# Patient Record
Sex: MALE | Race: WHITE | Employment: FULL TIME | URBAN - METROPOLITAN AREA
[De-identification: names, ages, dates, MRNs, and addresses within clinical notes are randomized per-mention and may not be internally consistent; named-entity substitution may affect disease eponyms.]

---

## 2018-08-18 ENCOUNTER — HOSPITAL ENCOUNTER (EMERGENCY)
Age: 68
Discharge: HOME | End: 2018-08-18
Attending: EMERGENCY MEDICINE
Payer: COMMERCIAL

## 2018-08-18 ENCOUNTER — APPOINTMENT (OUTPATIENT)
Dept: GENERAL RADIOLOGY | Age: 68
End: 2018-08-18
Payer: COMMERCIAL

## 2018-08-18 VITALS
SYSTOLIC BLOOD PRESSURE: 169 MMHG | RESPIRATION RATE: 16 BRPM | TEMPERATURE: 99.2 F | DIASTOLIC BLOOD PRESSURE: 116 MMHG | BODY MASS INDEX: 28.04 KG/M2 | HEART RATE: 60 BPM | WEIGHT: 185 LBS | OXYGEN SATURATION: 95 % | HEIGHT: 68 IN

## 2018-08-18 DIAGNOSIS — S20.211A RIB CONTUSION, RIGHT, INITIAL ENCOUNTER: ICD-10-CM
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
## 2018-08-18 PROCEDURE — 99283 EMERGENCY DEPT VISIT LOW MDM: CPT

## 2018-08-18 PROCEDURE — 6370000000 HC RX 637 (ALT 250 FOR IP): Performed by: NURSE PRACTITIONER

## 2018-08-18 PROCEDURE — 71046 X-RAY EXAM CHEST 2 VIEWS: CPT

## 2018-08-18 RX ORDER — METOPROLOL SUCCINATE 25 MG/1
12.5 TABLET, EXTENDED RELEASE ORAL DAILY
COMMUNITY

## 2018-08-18 RX ORDER — HYDROCODONE BITARTRATE AND ACETAMINOPHEN 5; 325 MG/1; MG/1
1 TABLET ORAL ONCE
Status: COMPLETED | OUTPATIENT
Start: 2018-08-18 | End: 2018-08-18

## 2018-08-18 RX ORDER — LEVOTHYROXINE SODIUM 0.03 MG/1
25 TABLET ORAL DAILY
COMMUNITY

## 2018-08-18 RX ORDER — HYDROCODONE BITARTRATE AND ACETAMINOPHEN 5; 325 MG/1; MG/1
1-2 TABLET ORAL EVERY 6 HOURS PRN
Qty: 8 TABLET | Refills: 0 | Status: SHIPPED | OUTPATIENT
Start: 2018-08-18 | End: 2018-08-20

## 2018-08-18 RX ORDER — ROSUVASTATIN CALCIUM 40 MG/1
40 TABLET, COATED ORAL EVERY EVENING
COMMUNITY

## 2018-08-18 RX ORDER — LIDOCAINE 50 MG/G
1 PATCH TOPICAL DAILY
Qty: 30 PATCH | Refills: 0 | Status: SHIPPED | OUTPATIENT
Start: 2018-08-18

## 2018-08-18 RX ADMIN — HYDROCODONE BITARTRATE AND ACETAMINOPHEN 1 TABLET: 5; 325 TABLET ORAL at 20:07

## 2018-08-18 ASSESSMENT — ENCOUNTER SYMPTOMS
COUGH: 0
SHORTNESS OF BREATH: 0
NAUSEA: 0
BACK PAIN: 1
ABDOMINAL DISTENTION: 0
ABDOMINAL PAIN: 0
EYES NEGATIVE: 1
ALLERGIC/IMMUNOLOGIC NEGATIVE: 1
VOMITING: 0
WHEEZING: 0

## 2018-08-18 ASSESSMENT — PAIN DESCRIPTION - PAIN TYPE: TYPE: ACUTE PAIN

## 2018-08-18 ASSESSMENT — PAIN DESCRIPTION - LOCATION: LOCATION: BACK;RIB CAGE

## 2018-08-18 ASSESSMENT — PAIN SCALES - GENERAL
PAINLEVEL_OUTOF10: 3
PAINLEVEL_OUTOF10: 6

## 2018-08-19 NOTE — ED PROVIDER NOTES
Neurological: Negative for dizziness, seizures, syncope, speech difficulty, weakness, light-headedness, numbness and headaches. Hematological: Negative. Psychiatric/Behavioral: Negative. Positives and Pertinent negatives as per HPI. Except as noted above in the ROS, all other systems were reviewed and negative. PAST MEDICAL HISTORY     Past Medical History:   Diagnosis Date    CAD (coronary artery disease)     Hypertension     Thyroid disease          SURGICAL HISTORY       Past Surgical History:   Procedure Laterality Date    CORONARY ANGIOPLASTY WITH STENT PLACEMENT           CURRENT MEDICATIONS       Previous Medications    ASPIRIN 81 MG TABLET    Take 81 mg by mouth daily    LEVOTHYROXINE (SYNTHROID) 25 MCG TABLET    Take 25 mcg by mouth Daily    METOPROLOL SUCCINATE (TOPROL XL) 25 MG EXTENDED RELEASE TABLET    Take 12.5 mg by mouth daily    ROSUVASTATIN (CRESTOR) 40 MG TABLET    Take 40 mg by mouth every evening         ALLERGIES     Patient has no known allergies. FAMILY HISTORY     History reviewed. No pertinent family history. SOCIAL HISTORY       Social History     Social History    Marital status:      Spouse name: N/A    Number of children: N/A    Years of education: N/A     Social History Main Topics    Smoking status: Never Smoker    Smokeless tobacco: None    Alcohol use Yes      Comment: occ    Drug use: No    Sexual activity: Not Asked     Other Topics Concern    None     Social History Narrative    None       SCREENINGS             PHYSICAL EXAM    (up to 7 for level 4, 8 or more for level 5)     ED Triage Vitals [08/18/18 1927]   BP Temp Temp Source Pulse Resp SpO2 Height Weight   (!) 169/116 99.2 °F (37.3 °C) Oral 64 18 97 % 5' 8\" (1.727 m) 185 lb (83.9 kg)       Physical Exam   Constitutional: He is oriented to person, place, and time. He appears well-developed and well-nourished. No distress. HENT:   Head: Normocephalic and atraumatic.    Nose: The patient and / or the family were informed of the results of any tests, a time was given to answer questions, a plan was proposed and they agreed with plan. FINAL IMPRESSION      1. Fall on same level from tripping as cause of accidental injury    2. Rib contusion, right, initial encounter          DISPOSITION/PLAN   DISPOSITION        PATIENT REFERRED TO:  Molly Frank  [phone]  Schedule an appointment as soon as possible for a visit in 3 days  For recheck    [facility]  ED  [address] [facility] [address] [facility]  Go to   For new or worsening symptoms      DISCHARGE MEDICATIONS:  New Prescriptions    HYDROCODONE-ACETAMINOPHEN (NORCO) 5-325 MG PER TABLET    Take 1-2 tablets by mouth every 6 hours as needed for Pain for up to 2 days. Sedation precautions please. LIDOCAINE (LIDODERM) 5 %    Place 1 patch onto the skin daily 12 hours on, 12 hours off.        DISCONTINUED MEDICATIONS:  Discontinued Medications    No medications on file              (Please note that portions of this note were completed with a voice recognition program.  Efforts were made to edit the dictations but occasionally words are mis-transcribed.)    AMAURI Hankins CNP (electronically signed)            AMAURI Hankins CNP  08/18/18 7065

## 2018-08-19 NOTE — ED NOTES
Cleaned pt's knee abraision with Hibiclens, Saline, and 4x4 gauze. Pt tolerated well. [facility] NP notified of completion.        Melly Olivares  08/18/18 2053

## 2018-08-19 NOTE — ED PROVIDER NOTES
I independently performed a history and physical on Amina Darnell. All diagnostic, treatment, and disposition decisions were made by myself in conjunction with the advanced practice provider. For further details of [address] emergency department encounter, please see Namita Welch NP's documentation. Patient is a well-appearing 65-year-old male who was playing pickle ball just PTA whenever he dove for a ball and subsequently landed on his right chest and lower back and is complaining about right chest pain currently. He had actually no chest pain before the fall and briefly had some shortness of breath after the fall but denies any shortness of breath currently. He does have a history of coronary stent but states this chest pain feels different. Denies any head trauma and he did not pass out. He denies any headache, neck pain, current back pain, abdominal pain, extremity pain other than mild discomfort to the right knee where he has an abrasion. No other concerns at this time he just wanted to get an x-ray to make sure he had no rib fractures. Physical:   Gen: No acute distress. AOx3.   Pysch: Normal mood and affect  HEENT: NCAT, EOMI, PERRL, MMM  Neck: supple, NTTP, no midline tenderness  Back: no midline tenderness, mild right thoracic paraspinal tenderness, no lumbar tenderness   Cardiac: RRR, pulses 2+ in upper extremities  Chest: Tenderness to palpation to right lateral chest, no crepitus, no flail segment  Lungs: C2AB, no R/R/W  Abdomen: soft and nontender with no R/D/G  Neuro: no focal neuro deficits with strength and sensation 5/5 in all 4 extremities  MSK: Normal range of motion of bilateral shoulders, elbows, wrists, hips, knees, ankles and nontender to palpation of all joints   Skin: abrasion to right knee, no ecchymosis noted to right chest    MDM:  Patient was evaluated due to concern for fall while playing Loopback ball and subsequently experiencing sudden chest pain after the

## 2018-09-05 ENCOUNTER — OFFICE VISIT CONVERTED (OUTPATIENT)
Dept: PULMONOLOGY | Facility: CLINIC | Age: 68
End: 2018-09-05
Attending: INTERNAL MEDICINE

## 2018-11-14 ENCOUNTER — OFFICE VISIT CONVERTED (OUTPATIENT)
Dept: UROLOGY | Facility: CLINIC | Age: 68
End: 2018-11-14
Attending: UROLOGY

## 2019-04-03 ENCOUNTER — HOSPITAL ENCOUNTER (OUTPATIENT)
Dept: LAB | Facility: HOSPITAL | Age: 69
Discharge: HOME | End: 2019-04-03
Attending: INTERNAL MEDICINE

## 2019-04-03 LAB
25(OH)D3 SERPL-MCNC: 30.5 NG/ML (ref 30–100)
ALBUMIN SERPL-MCNC: 4.3 G/DL (ref 3.5–5)
ALBUMIN/GLOB SERPL: 1.7 {RATIO} (ref 1.4–2.6)
ALP SERPL-CCNC: 67 U/L (ref 56–155)
ALT SERPL-CCNC: 36 U/L (ref 10–40)
ANION GAP SERPL CALC-SCNC: 20 MMOL/L (ref 8–19)
AST SERPL-CCNC: 26 U/L (ref 15–50)
BASOPHILS # BLD AUTO: 0.06 10*3/UL (ref 0–0.2)
BASOPHILS NFR BLD AUTO: 1 % (ref 0–3)
BILIRUB SERPL-MCNC: 0.33 MG/DL (ref 0.2–1.3)
BUN SERPL-MCNC: 18 MG/DL (ref 5–25)
BUN/CREAT SERPL: 13 {RATIO} (ref 6–20)
CALCIUM SERPL-MCNC: 9.4 MG/DL (ref 8.7–10.4)
CHLORIDE SERPL-SCNC: 106 MMOL/L (ref 99–111)
CHOLEST SERPL-MCNC: 126 MG/DL (ref 107–200)
CHOLEST/HDLC SERPL: 3 {RATIO} (ref 3–6)
CONV ABS IMM GRAN: 0.02 10*3/UL (ref 0–0.2)
CONV CO2: 25 MMOL/L (ref 22–32)
CONV IMMATURE GRAN: 0.3 % (ref 0–1.8)
CONV TOTAL PROTEIN: 6.8 G/DL (ref 6.3–8.2)
CREAT UR-MCNC: 1.38 MG/DL (ref 0.7–1.2)
DEPRECATED RDW RBC AUTO: 44.5 FL (ref 35.1–43.9)
EOSINOPHIL # BLD AUTO: 0.17 10*3/UL (ref 0–0.7)
EOSINOPHIL # BLD AUTO: 2.9 % (ref 0–7)
ERYTHROCYTE [DISTWIDTH] IN BLOOD BY AUTOMATED COUNT: 12.3 % (ref 11.6–14.4)
GFR SERPLBLD BASED ON 1.73 SQ M-ARVRAT: 51 ML/MIN/{1.73_M2}
GLOBULIN UR ELPH-MCNC: 2.5 G/DL (ref 2–3.5)
GLUCOSE SERPL-MCNC: 111 MG/DL (ref 70–99)
HBA1C MFR BLD: 14.7 G/DL (ref 14–18)
HCT VFR BLD AUTO: 47.4 % (ref 42–52)
HDLC SERPL-MCNC: 42 MG/DL (ref 40–60)
LDLC SERPL CALC-MCNC: 54 MG/DL (ref 70–100)
LYMPHOCYTES # BLD AUTO: 1.3 10*3/UL (ref 1–5)
MCH RBC QN AUTO: 30.3 PG (ref 27–31)
MCHC RBC AUTO-ENTMCNC: 31 G/DL (ref 33–37)
MCV RBC AUTO: 97.7 FL (ref 80–96)
MONOCYTES # BLD AUTO: 0.53 10*3/UL (ref 0.2–1.2)
MONOCYTES NFR BLD AUTO: 9 % (ref 3–10)
NEUTROPHILS # BLD AUTO: 3.84 10*3/UL (ref 2–8)
NEUTROPHILS NFR BLD AUTO: 64.8 % (ref 30–85)
NRBC CBCN: 0 % (ref 0–0.7)
OSMOLALITY SERPL CALC.SUM OF ELEC: 305 MOSM/KG (ref 273–304)
PLATELET # BLD AUTO: 176 10*3/UL (ref 130–400)
PMV BLD AUTO: 10.4 FL (ref 9.4–12.4)
POTASSIUM SERPL-SCNC: 4.6 MMOL/L (ref 3.5–5.3)
RBC # BLD AUTO: 4.85 10*6/UL (ref 4.7–6.1)
SODIUM SERPL-SCNC: 146 MMOL/L (ref 135–147)
T4 FREE SERPL-MCNC: 1.2 NG/DL (ref 0.9–1.8)
TRIGL SERPL-MCNC: 152 MG/DL (ref 40–150)
TSH SERPL-ACNC: 3.89 M[IU]/L (ref 0.27–4.2)
VARIANT LYMPHS NFR BLD MANUAL: 22 % (ref 20–45)
VLDLC SERPL-MCNC: 30 MG/DL (ref 5–37)
WBC # BLD AUTO: 5.92 10*3/UL (ref 4.8–10.8)

## 2019-08-22 ENCOUNTER — HOSPITAL ENCOUNTER (OUTPATIENT)
Dept: LAB | Facility: HOSPITAL | Age: 69
Discharge: HOME | End: 2019-08-22
Attending: INTERNAL MEDICINE

## 2019-08-22 LAB
25(OH)D3 SERPL-MCNC: 34.6 NG/ML (ref 30–100)
ALBUMIN SERPL-MCNC: 4.3 G/DL (ref 3.5–5)
ALBUMIN/GLOB SERPL: 1.9 {RATIO} (ref 1.4–2.6)
ALP SERPL-CCNC: 64 U/L (ref 56–155)
ALT SERPL-CCNC: 39 U/L (ref 10–40)
ANION GAP SERPL CALC-SCNC: 15 MMOL/L (ref 8–19)
APPEARANCE UR: CLEAR
AST SERPL-CCNC: 30 U/L (ref 15–50)
BASOPHILS # BLD AUTO: 0.05 10*3/UL (ref 0–0.2)
BASOPHILS NFR BLD AUTO: 0.8 % (ref 0–3)
BILIRUB SERPL-MCNC: 0.34 MG/DL (ref 0.2–1.3)
BILIRUB UR QL: NEGATIVE
BUN SERPL-MCNC: 17 MG/DL (ref 5–25)
BUN/CREAT SERPL: 15 {RATIO} (ref 6–20)
CALCIUM SERPL-MCNC: 9.3 MG/DL (ref 8.7–10.4)
CHLORIDE SERPL-SCNC: 107 MMOL/L (ref 99–111)
CHOLEST SERPL-MCNC: 107 MG/DL (ref 107–200)
CHOLEST/HDLC SERPL: 2.5 {RATIO} (ref 3–6)
COLOR UR: YELLOW
CONV ABS IMM GRAN: 0.02 10*3/UL (ref 0–0.2)
CONV CO2: 25 MMOL/L (ref 22–32)
CONV COLLECTION SOURCE (UA): ABNORMAL
CONV IMMATURE GRAN: 0.3 % (ref 0–1.8)
CONV TOTAL PROTEIN: 6.6 G/DL (ref 6.3–8.2)
CONV UROBILINOGEN IN URINE BY AUTOMATED TEST STRIP: 0.2 {EHRLICHU}/DL (ref 0.1–1)
CREAT UR-MCNC: 1.17 MG/DL (ref 0.7–1.2)
DEPRECATED RDW RBC AUTO: 42.9 FL (ref 35.1–43.9)
EOSINOPHIL # BLD AUTO: 0.09 10*3/UL (ref 0–0.7)
EOSINOPHIL # BLD AUTO: 1.4 % (ref 0–7)
ERYTHROCYTE [DISTWIDTH] IN BLOOD BY AUTOMATED COUNT: 12.6 % (ref 11.6–14.4)
FOLATE SERPL-MCNC: 9.5 NG/ML (ref 4.8–20)
GFR SERPLBLD BASED ON 1.73 SQ M-ARVRAT: >60 ML/MIN/{1.73_M2}
GLOBULIN UR ELPH-MCNC: 2.3 G/DL (ref 2–3.5)
GLUCOSE SERPL-MCNC: 86 MG/DL (ref 70–99)
GLUCOSE UR QL: NEGATIVE MG/DL
HCT VFR BLD AUTO: 44.8 % (ref 42–52)
HDLC SERPL-MCNC: 43 MG/DL (ref 40–60)
HGB BLD-MCNC: 14.6 G/DL (ref 14–18)
HGB UR QL STRIP: NEGATIVE
KETONES UR QL STRIP: ABNORMAL MG/DL
LDLC SERPL CALC-MCNC: 43 MG/DL (ref 70–100)
LEUKOCYTE ESTERASE UR QL STRIP: NEGATIVE
LYMPHOCYTES # BLD AUTO: 1.41 10*3/UL (ref 1–5)
LYMPHOCYTES NFR BLD AUTO: 21.4 % (ref 20–45)
MCH RBC QN AUTO: 30.2 PG (ref 27–31)
MCHC RBC AUTO-ENTMCNC: 32.6 G/DL (ref 33–37)
MCV RBC AUTO: 92.6 FL (ref 80–96)
MONOCYTES # BLD AUTO: 0.41 10*3/UL (ref 0.2–1.2)
MONOCYTES NFR BLD AUTO: 6.2 % (ref 3–10)
NEUTROPHILS # BLD AUTO: 4.61 10*3/UL (ref 2–8)
NEUTROPHILS NFR BLD AUTO: 69.9 % (ref 30–85)
NITRITE UR QL STRIP: NEGATIVE
NRBC CBCN: 0 % (ref 0–0.7)
OSMOLALITY SERPL CALC.SUM OF ELEC: 297 MOSM/KG (ref 273–304)
PH UR STRIP.AUTO: 5.5 [PH] (ref 5–8)
PLATELET # BLD AUTO: 215 10*3/UL (ref 130–400)
PMV BLD AUTO: 10.7 FL (ref 9.4–12.4)
POTASSIUM SERPL-SCNC: 4.4 MMOL/L (ref 3.5–5.3)
PROT UR QL: NEGATIVE MG/DL
RBC # BLD AUTO: 4.84 10*6/UL (ref 4.7–6.1)
SODIUM SERPL-SCNC: 143 MMOL/L (ref 135–147)
SP GR UR: 1.02 (ref 1–1.03)
T4 FREE SERPL-MCNC: 1.2 NG/DL (ref 0.9–1.8)
TRIGL SERPL-MCNC: 105 MG/DL (ref 40–150)
TSH SERPL-ACNC: 3.14 M[IU]/L (ref 0.27–4.2)
VIT B12 SERPL-MCNC: 649 PG/ML (ref 211–911)
VLDLC SERPL-MCNC: 21 MG/DL (ref 5–37)
WBC # BLD AUTO: 6.59 10*3/UL (ref 4.8–10.8)

## 2019-12-04 ENCOUNTER — HOSPITAL ENCOUNTER (OUTPATIENT)
Dept: LAB | Facility: HOSPITAL | Age: 69
Discharge: HOME | End: 2019-12-04
Attending: UROLOGY

## 2019-12-04 LAB — PSA SERPL-MCNC: 6.89 NG/ML (ref 0–4)

## 2019-12-11 ENCOUNTER — OFFICE VISIT CONVERTED (OUTPATIENT)
Dept: UROLOGY | Facility: CLINIC | Age: 69
End: 2019-12-11
Attending: UROLOGY

## 2019-12-11 ENCOUNTER — CONVERSION ENCOUNTER (OUTPATIENT)
Dept: SURGERY | Facility: CLINIC | Age: 69
End: 2019-12-11

## 2020-01-15 ENCOUNTER — OFFICE VISIT CONVERTED (OUTPATIENT)
Dept: UROLOGY | Facility: CLINIC | Age: 70
End: 2020-01-15
Attending: UROLOGY

## 2020-02-14 ENCOUNTER — HOSPITAL ENCOUNTER (OUTPATIENT)
Dept: GASTROENTEROLOGY | Facility: HOSPITAL | Age: 70
Setting detail: HOSPITAL OUTPATIENT SURGERY
Discharge: HOME | End: 2020-02-14
Attending: INTERNAL MEDICINE

## 2020-03-13 ENCOUNTER — HOSPITAL ENCOUNTER (OUTPATIENT)
Dept: SURGERY | Facility: CLINIC | Age: 70
Discharge: HOME | End: 2020-03-13
Attending: UROLOGY

## 2020-03-13 ENCOUNTER — PROCEDURE VISIT CONVERTED (OUTPATIENT)
Dept: UROLOGY | Facility: CLINIC | Age: 70
End: 2020-03-13
Attending: UROLOGY

## 2020-05-07 ENCOUNTER — HOSPITAL ENCOUNTER (OUTPATIENT)
Dept: LAB | Facility: HOSPITAL | Age: 70
Discharge: HOME | End: 2020-05-07
Attending: INTERNAL MEDICINE

## 2020-05-07 LAB
25(OH)D3 SERPL-MCNC: 31.9 NG/ML (ref 30–100)
ALBUMIN SERPL-MCNC: 4.2 G/DL (ref 3.5–5)
ALBUMIN/GLOB SERPL: 1.8 {RATIO} (ref 1.4–2.6)
ALP SERPL-CCNC: 61 U/L (ref 56–155)
ALT SERPL-CCNC: 24 U/L (ref 10–40)
ANION GAP SERPL CALC-SCNC: 16 MMOL/L (ref 8–19)
AST SERPL-CCNC: 23 U/L (ref 15–50)
BASOPHILS # BLD AUTO: 0.06 10*3/UL (ref 0–0.2)
BASOPHILS NFR BLD AUTO: 1.2 % (ref 0–3)
BILIRUB SERPL-MCNC: 0.32 MG/DL (ref 0.2–1.3)
BUN SERPL-MCNC: 17 MG/DL (ref 5–25)
BUN/CREAT SERPL: 13 {RATIO} (ref 6–20)
CALCIUM SERPL-MCNC: 9 MG/DL (ref 8.7–10.4)
CHLORIDE SERPL-SCNC: 104 MMOL/L (ref 99–111)
CHOLEST SERPL-MCNC: 145 MG/DL (ref 107–200)
CHOLEST/HDLC SERPL: 3.4 {RATIO} (ref 3–6)
CONV ABS IMM GRAN: 0.01 10*3/UL (ref 0–0.2)
CONV CO2: 26 MMOL/L (ref 22–32)
CONV IMMATURE GRAN: 0.2 % (ref 0–1.8)
CONV TOTAL PROTEIN: 6.6 G/DL (ref 6.3–8.2)
CREAT UR-MCNC: 1.26 MG/DL (ref 0.7–1.2)
DEPRECATED RDW RBC AUTO: 43.4 FL (ref 35.1–43.9)
EOSINOPHIL # BLD AUTO: 0.08 10*3/UL (ref 0–0.7)
EOSINOPHIL # BLD AUTO: 1.6 % (ref 0–7)
ERYTHROCYTE [DISTWIDTH] IN BLOOD BY AUTOMATED COUNT: 12.4 % (ref 11.6–14.4)
FOLATE SERPL-MCNC: 11.6 NG/ML (ref 4.8–20)
GFR SERPLBLD BASED ON 1.73 SQ M-ARVRAT: 57 ML/MIN/{1.73_M2}
GLOBULIN UR ELPH-MCNC: 2.4 G/DL (ref 2–3.5)
GLUCOSE SERPL-MCNC: 99 MG/DL (ref 70–99)
HCT VFR BLD AUTO: 48.5 % (ref 42–52)
HDLC SERPL-MCNC: 43 MG/DL (ref 40–60)
HGB BLD-MCNC: 15.3 G/DL (ref 14–18)
LDLC SERPL CALC-MCNC: 80 MG/DL (ref 70–100)
LYMPHOCYTES # BLD AUTO: 1.5 10*3/UL (ref 1–5)
LYMPHOCYTES NFR BLD AUTO: 30.8 % (ref 20–45)
MCH RBC QN AUTO: 29.8 PG (ref 27–31)
MCHC RBC AUTO-ENTMCNC: 31.5 G/DL (ref 33–37)
MCV RBC AUTO: 94.4 FL (ref 80–96)
MONOCYTES # BLD AUTO: 0.45 10*3/UL (ref 0.2–1.2)
MONOCYTES NFR BLD AUTO: 9.2 % (ref 3–10)
NEUTROPHILS # BLD AUTO: 2.77 10*3/UL (ref 2–8)
NEUTROPHILS NFR BLD AUTO: 57 % (ref 30–85)
NRBC CBCN: 0 % (ref 0–0.7)
OSMOLALITY SERPL CALC.SUM OF ELEC: 296 MOSM/KG (ref 273–304)
PLATELET # BLD AUTO: 192 10*3/UL (ref 130–400)
PMV BLD AUTO: 9.9 FL (ref 9.4–12.4)
POTASSIUM SERPL-SCNC: 4.3 MMOL/L (ref 3.5–5.3)
RBC # BLD AUTO: 5.14 10*6/UL (ref 4.7–6.1)
SODIUM SERPL-SCNC: 142 MMOL/L (ref 135–147)
T4 FREE SERPL-MCNC: 1.2 NG/DL (ref 0.9–1.8)
TRIGL SERPL-MCNC: 108 MG/DL (ref 40–150)
TSH SERPL-ACNC: 4.8 M[IU]/L (ref 0.27–4.2)
VIT B12 SERPL-MCNC: 477 PG/ML (ref 211–911)
VLDLC SERPL-MCNC: 22 MG/DL (ref 5–37)
WBC # BLD AUTO: 4.87 10*3/UL (ref 4.8–10.8)

## 2020-08-12 ENCOUNTER — HOSPITAL ENCOUNTER (OUTPATIENT)
Dept: LAB | Facility: HOSPITAL | Age: 70
Discharge: HOME | End: 2020-08-12
Attending: INTERNAL MEDICINE

## 2020-08-12 LAB
25(OH)D3 SERPL-MCNC: 32 NG/ML (ref 30–100)
ALBUMIN SERPL-MCNC: 4.2 G/DL (ref 3.5–5)
ALBUMIN/GLOB SERPL: 1.8 {RATIO} (ref 1.4–2.6)
ALP SERPL-CCNC: 60 U/L (ref 56–155)
ALT SERPL-CCNC: 30 U/L (ref 10–40)
ANION GAP SERPL CALC-SCNC: 22 MMOL/L (ref 8–19)
AST SERPL-CCNC: 28 U/L (ref 15–50)
BASOPHILS # BLD AUTO: 0.07 10*3/UL (ref 0–0.2)
BASOPHILS NFR BLD AUTO: 1.3 % (ref 0–3)
BILIRUB SERPL-MCNC: 0.33 MG/DL (ref 0.2–1.3)
BUN SERPL-MCNC: 16 MG/DL (ref 5–25)
BUN/CREAT SERPL: 12 {RATIO} (ref 6–20)
CALCIUM SERPL-MCNC: 10.2 MG/DL (ref 8.7–10.4)
CHLORIDE SERPL-SCNC: 108 MMOL/L (ref 99–111)
CHOLEST SERPL-MCNC: 153 MG/DL (ref 107–200)
CHOLEST/HDLC SERPL: 3.7 {RATIO} (ref 3–6)
CONV ABS IMM GRAN: 0.01 10*3/UL (ref 0–0.2)
CONV CO2: 21 MMOL/L (ref 22–32)
CONV IMMATURE GRAN: 0.2 % (ref 0–1.8)
CONV TOTAL PROTEIN: 6.6 G/DL (ref 6.3–8.2)
CREAT UR-MCNC: 1.31 MG/DL (ref 0.7–1.2)
DEPRECATED RDW RBC AUTO: 45.2 FL (ref 35.1–43.9)
EOSINOPHIL # BLD AUTO: 0.19 10*3/UL (ref 0–0.7)
EOSINOPHIL # BLD AUTO: 3.5 % (ref 0–7)
ERYTHROCYTE [DISTWIDTH] IN BLOOD BY AUTOMATED COUNT: 12.9 % (ref 11.6–14.4)
FOLATE SERPL-MCNC: 12.2 NG/ML (ref 4.8–20)
GFR SERPLBLD BASED ON 1.73 SQ M-ARVRAT: 54 ML/MIN/{1.73_M2}
GLOBULIN UR ELPH-MCNC: 2.4 G/DL (ref 2–3.5)
GLUCOSE SERPL-MCNC: 104 MG/DL (ref 70–99)
HCT VFR BLD AUTO: 45.8 % (ref 42–52)
HDLC SERPL-MCNC: 41 MG/DL (ref 40–60)
HGB BLD-MCNC: 14.6 G/DL (ref 14–18)
LDLC SERPL CALC-MCNC: 72 MG/DL (ref 70–100)
LYMPHOCYTES # BLD AUTO: 1.88 10*3/UL (ref 1–5)
LYMPHOCYTES NFR BLD AUTO: 34.4 % (ref 20–45)
MCH RBC QN AUTO: 30.5 PG (ref 27–31)
MCHC RBC AUTO-ENTMCNC: 31.9 G/DL (ref 33–37)
MCV RBC AUTO: 95.8 FL (ref 80–96)
MONOCYTES # BLD AUTO: 0.49 10*3/UL (ref 0.2–1.2)
MONOCYTES NFR BLD AUTO: 9 % (ref 3–10)
NEUTROPHILS # BLD AUTO: 2.83 10*3/UL (ref 2–8)
NEUTROPHILS NFR BLD AUTO: 51.6 % (ref 30–85)
NRBC CBCN: 0 % (ref 0–0.7)
OSMOLALITY SERPL CALC.SUM OF ELEC: 303 MOSM/KG (ref 273–304)
PLATELET # BLD AUTO: 179 10*3/UL (ref 130–400)
PMV BLD AUTO: 10.8 FL (ref 9.4–12.4)
POTASSIUM SERPL-SCNC: 4.5 MMOL/L (ref 3.5–5.3)
RBC # BLD AUTO: 4.78 10*6/UL (ref 4.7–6.1)
SODIUM SERPL-SCNC: 146 MMOL/L (ref 135–147)
T4 FREE SERPL-MCNC: 1.1 NG/DL (ref 0.9–1.8)
TRIGL SERPL-MCNC: 198 MG/DL (ref 40–150)
TSH SERPL-ACNC: 6.2 M[IU]/L (ref 0.27–4.2)
VIT B12 SERPL-MCNC: 463 PG/ML (ref 211–911)
VLDLC SERPL-MCNC: 40 MG/DL (ref 5–37)
WBC # BLD AUTO: 5.47 10*3/UL (ref 4.8–10.8)

## 2020-09-17 ENCOUNTER — HOSPITAL ENCOUNTER (OUTPATIENT)
Dept: LAB | Facility: HOSPITAL | Age: 70
Discharge: HOME | End: 2020-09-17
Attending: UROLOGY

## 2020-09-17 LAB — PSA SERPL-MCNC: 9.51 NG/ML (ref 0–4)

## 2020-09-18 ENCOUNTER — TELEPHONE CONVERTED (OUTPATIENT)
Dept: UROLOGY | Facility: CLINIC | Age: 70
End: 2020-09-18
Attending: UROLOGY

## 2020-10-19 ENCOUNTER — TELEPHONE CONVERTED (OUTPATIENT)
Dept: UROLOGY | Facility: CLINIC | Age: 70
End: 2020-10-19
Attending: UROLOGY

## 2020-10-20 ENCOUNTER — HOSPITAL ENCOUNTER (OUTPATIENT)
Dept: OTHER | Facility: HOSPITAL | Age: 70
Discharge: HOME | End: 2020-10-20
Attending: UROLOGY

## 2020-10-23 ENCOUNTER — HOSPITAL ENCOUNTER (OUTPATIENT)
Dept: PREADMISSION TESTING | Facility: HOSPITAL | Age: 70
Discharge: HOME | End: 2020-10-23
Attending: UROLOGY

## 2020-10-26 LAB — SARS-COV-2 RNA SPEC QL NAA+PROBE: NOT DETECTED

## 2020-10-28 ENCOUNTER — HOSPITAL ENCOUNTER (OUTPATIENT)
Dept: PERIOP | Facility: HOSPITAL | Age: 70
Setting detail: HOSPITAL OUTPATIENT SURGERY
Discharge: HOME | End: 2020-10-28
Attending: UROLOGY

## 2020-11-13 ENCOUNTER — TELEPHONE CONVERTED (OUTPATIENT)
Dept: UROLOGY | Facility: CLINIC | Age: 70
End: 2020-11-13
Attending: UROLOGY

## 2020-12-02 ENCOUNTER — HOSPITAL ENCOUNTER (OUTPATIENT)
Dept: LAB | Facility: HOSPITAL | Age: 70
Discharge: HOME | End: 2020-12-02
Attending: INTERNAL MEDICINE

## 2020-12-02 LAB
25(OH)D3 SERPL-MCNC: 26.5 NG/ML (ref 30–100)
ALBUMIN SERPL-MCNC: 4.1 G/DL (ref 3.5–5)
ALBUMIN/GLOB SERPL: 1.8 {RATIO} (ref 1.4–2.6)
ALP SERPL-CCNC: 60 U/L (ref 56–155)
ALT SERPL-CCNC: 45 U/L (ref 10–40)
ANION GAP SERPL CALC-SCNC: 17 MMOL/L (ref 8–19)
AST SERPL-CCNC: 34 U/L (ref 15–50)
BASOPHILS # BLD AUTO: 0.05 10*3/UL (ref 0–0.2)
BASOPHILS NFR BLD AUTO: 1 % (ref 0–3)
BILIRUB SERPL-MCNC: 0.31 MG/DL (ref 0.2–1.3)
BUN SERPL-MCNC: 21 MG/DL (ref 5–25)
BUN/CREAT SERPL: 15 {RATIO} (ref 6–20)
CALCIUM SERPL-MCNC: 9 MG/DL (ref 8.7–10.4)
CHLORIDE SERPL-SCNC: 106 MMOL/L (ref 99–111)
CHOLEST SERPL-MCNC: 145 MG/DL (ref 107–200)
CHOLEST/HDLC SERPL: 3.4 {RATIO} (ref 3–6)
CONV ABS IMM GRAN: 0.02 10*3/UL (ref 0–0.2)
CONV CO2: 26 MMOL/L (ref 22–32)
CONV IMMATURE GRAN: 0.4 % (ref 0–1.8)
CONV TOTAL PROTEIN: 6.4 G/DL (ref 6.3–8.2)
CREAT UR-MCNC: 1.38 MG/DL (ref 0.7–1.2)
DEPRECATED RDW RBC AUTO: 42.8 FL (ref 35.1–43.9)
EOSINOPHIL # BLD AUTO: 0.2 10*3/UL (ref 0–0.7)
EOSINOPHIL # BLD AUTO: 3.9 % (ref 0–7)
ERYTHROCYTE [DISTWIDTH] IN BLOOD BY AUTOMATED COUNT: 12.4 % (ref 11.6–14.4)
GFR SERPLBLD BASED ON 1.73 SQ M-ARVRAT: 51 ML/MIN/{1.73_M2}
GLOBULIN UR ELPH-MCNC: 2.3 G/DL (ref 2–3.5)
GLUCOSE SERPL-MCNC: 121 MG/DL (ref 70–99)
HCT VFR BLD AUTO: 47.2 % (ref 42–52)
HDLC SERPL-MCNC: 43 MG/DL (ref 40–60)
HGB BLD-MCNC: 15.1 G/DL (ref 14–18)
LDLC SERPL CALC-MCNC: 77 MG/DL (ref 70–100)
LYMPHOCYTES # BLD AUTO: 1.67 10*3/UL (ref 1–5)
LYMPHOCYTES NFR BLD AUTO: 32.7 % (ref 20–45)
MCH RBC QN AUTO: 29.8 PG (ref 27–31)
MCHC RBC AUTO-ENTMCNC: 32 G/DL (ref 33–37)
MCV RBC AUTO: 93.3 FL (ref 80–96)
MONOCYTES # BLD AUTO: 0.45 10*3/UL (ref 0.2–1.2)
MONOCYTES NFR BLD AUTO: 8.8 % (ref 3–10)
NEUTROPHILS # BLD AUTO: 2.72 10*3/UL (ref 2–8)
NEUTROPHILS NFR BLD AUTO: 53.2 % (ref 30–85)
NRBC CBCN: 0 % (ref 0–0.7)
OSMOLALITY SERPL CALC.SUM OF ELEC: 302 MOSM/KG (ref 273–304)
PLATELET # BLD AUTO: 174 10*3/UL (ref 130–400)
PMV BLD AUTO: 10.1 FL (ref 9.4–12.4)
POTASSIUM SERPL-SCNC: 4.5 MMOL/L (ref 3.5–5.3)
RBC # BLD AUTO: 5.06 10*6/UL (ref 4.7–6.1)
SODIUM SERPL-SCNC: 144 MMOL/L (ref 135–147)
T4 FREE SERPL-MCNC: 1.1 NG/DL (ref 0.9–1.8)
TRIGL SERPL-MCNC: 126 MG/DL (ref 40–150)
TSH SERPL-ACNC: 4.3 M[IU]/L (ref 0.27–4.2)
VIT B12 SERPL-MCNC: 435 PG/ML (ref 211–911)
VLDLC SERPL-MCNC: 25 MG/DL (ref 5–37)
WBC # BLD AUTO: 5.11 10*3/UL (ref 4.8–10.8)

## 2020-12-02 PROCEDURE — 82746 ASSAY OF FOLIC ACID SERUM: CPT

## 2020-12-03 ENCOUNTER — LAB REQUISITION (OUTPATIENT)
Dept: LAB | Facility: HOSPITAL | Age: 70
End: 2020-12-03

## 2020-12-03 DIAGNOSIS — Z00.00 ROUTINE GENERAL MEDICAL EXAMINATION AT A HEALTH CARE FACILITY: ICD-10-CM

## 2020-12-04 LAB — FOLATE SERPL-MCNC: 9.17 NG/ML (ref 4.78–24.2)

## 2021-01-12 ENCOUNTER — HOSPITAL ENCOUNTER (OUTPATIENT)
Dept: OTHER | Facility: HOSPITAL | Age: 71
Discharge: HOME | End: 2021-01-12
Attending: INTERNAL MEDICINE

## 2021-02-05 ENCOUNTER — HOSPITAL ENCOUNTER (OUTPATIENT)
Dept: VACCINE CLINIC | Facility: HOSPITAL | Age: 71
Discharge: HOME | End: 2021-02-05
Attending: INTERNAL MEDICINE

## 2021-03-04 ENCOUNTER — HOSPITAL ENCOUNTER (OUTPATIENT)
Dept: LAB | Facility: HOSPITAL | Age: 71
Discharge: HOME | End: 2021-03-04
Attending: INTERNAL MEDICINE

## 2021-03-04 LAB
25(OH)D3 SERPL-MCNC: 22.5 NG/ML (ref 30–100)
ALBUMIN SERPL-MCNC: 4 G/DL (ref 3.5–5)
ALBUMIN/GLOB SERPL: 1.7 {RATIO} (ref 1.4–2.6)
ALP SERPL-CCNC: 65 U/L (ref 56–155)
ALT SERPL-CCNC: 53 U/L (ref 10–40)
ANION GAP SERPL CALC-SCNC: 11 MMOL/L (ref 8–19)
AST SERPL-CCNC: 39 U/L (ref 15–50)
BASOPHILS # BLD AUTO: 0.06 10*3/UL (ref 0–0.2)
BASOPHILS NFR BLD AUTO: 1.1 % (ref 0–3)
BILIRUB SERPL-MCNC: 0.34 MG/DL (ref 0.2–1.3)
BUN SERPL-MCNC: 19 MG/DL (ref 5–25)
BUN/CREAT SERPL: 14 {RATIO} (ref 6–20)
CALCIUM SERPL-MCNC: 9 MG/DL (ref 8.7–10.4)
CHLORIDE SERPL-SCNC: 104 MMOL/L (ref 99–111)
CHOLEST SERPL-MCNC: 160 MG/DL (ref 107–200)
CHOLEST/HDLC SERPL: 4 {RATIO} (ref 3–6)
CONV ABS IMM GRAN: 0.01 10*3/UL (ref 0–0.2)
CONV CO2: 27 MMOL/L (ref 22–32)
CONV IMMATURE GRAN: 0.2 % (ref 0–1.8)
CONV TOTAL PROTEIN: 6.3 G/DL (ref 6.3–8.2)
CREAT UR-MCNC: 1.32 MG/DL (ref 0.7–1.2)
DEPRECATED RDW RBC AUTO: 41.7 FL (ref 35.1–43.9)
EOSINOPHIL # BLD AUTO: 0.16 10*3/UL (ref 0–0.7)
EOSINOPHIL # BLD AUTO: 3 % (ref 0–7)
ERYTHROCYTE [DISTWIDTH] IN BLOOD BY AUTOMATED COUNT: 12.3 % (ref 11.6–14.4)
EST. AVERAGE GLUCOSE BLD GHB EST-MCNC: 120 MG/DL
FOLATE SERPL-MCNC: 9.8 NG/ML (ref 4.8–20)
GFR SERPLBLD BASED ON 1.73 SQ M-ARVRAT: 54 ML/MIN/{1.73_M2}
GLOBULIN UR ELPH-MCNC: 2.3 G/DL (ref 2–3.5)
GLUCOSE SERPL-MCNC: 99 MG/DL (ref 70–99)
HBA1C MFR BLD: 5.8 % (ref 3.5–5.7)
HCT VFR BLD AUTO: 45.4 % (ref 42–52)
HDLC SERPL-MCNC: 40 MG/DL (ref 40–60)
HGB BLD-MCNC: 14.8 G/DL (ref 14–18)
LDLC SERPL CALC-MCNC: 95 MG/DL (ref 70–100)
LYMPHOCYTES # BLD AUTO: 1.95 10*3/UL (ref 1–5)
LYMPHOCYTES NFR BLD AUTO: 36.4 % (ref 20–45)
MCH RBC QN AUTO: 30 PG (ref 27–31)
MCHC RBC AUTO-ENTMCNC: 32.6 G/DL (ref 33–37)
MCV RBC AUTO: 92.1 FL (ref 80–96)
MONOCYTES # BLD AUTO: 0.5 10*3/UL (ref 0.2–1.2)
MONOCYTES NFR BLD AUTO: 9.3 % (ref 3–10)
NEUTROPHILS # BLD AUTO: 2.68 10*3/UL (ref 2–8)
NEUTROPHILS NFR BLD AUTO: 50 % (ref 30–85)
NRBC CBCN: 0 % (ref 0–0.7)
OSMOLALITY SERPL CALC.SUM OF ELEC: 288 MOSM/KG (ref 273–304)
PLATELET # BLD AUTO: 190 10*3/UL (ref 130–400)
PMV BLD AUTO: 9.8 FL (ref 9.4–12.4)
POTASSIUM SERPL-SCNC: 4.4 MMOL/L (ref 3.5–5.3)
RBC # BLD AUTO: 4.93 10*6/UL (ref 4.7–6.1)
SODIUM SERPL-SCNC: 138 MMOL/L (ref 135–147)
T4 FREE SERPL-MCNC: 1.1 NG/DL (ref 0.9–1.8)
TRIGL SERPL-MCNC: 127 MG/DL (ref 40–150)
TSH SERPL-ACNC: 7.25 M[IU]/L (ref 0.27–4.2)
VIT B12 SERPL-MCNC: 466 PG/ML (ref 211–911)
VLDLC SERPL-MCNC: 25 MG/DL (ref 5–37)
WBC # BLD AUTO: 5.36 10*3/UL (ref 4.8–10.8)

## 2021-04-09 ENCOUNTER — HOSPITAL ENCOUNTER (OUTPATIENT)
Dept: LAB | Facility: HOSPITAL | Age: 71
Discharge: HOME | End: 2021-04-09
Attending: UROLOGY

## 2021-04-09 LAB — PSA SERPL-MCNC: 6.58 NG/ML (ref 0–4)

## 2021-05-10 NOTE — H&P
History and Physical      Patient Name: Yann Pardo IV   Patient ID: [id number]   Sex: Male   YOB: 1950    Primary Care Provider: Katalina Abbott MD   Referring Provider: Katalina Abbott MD    Visit Date: October 19, 2020    Provider: Raymundo Rubio MD   [facility] [facility]   Location Address: [address] [place] [address]  [id number]   Location Phone: [phone]          Chief Complaint  · Outpatient History & Physical / Surgical Orders      History Of Present Illness  [facility]  Outpatient History and Physical Surgical Orders  Preadmission Location: Phone Preadmission Time: 09:30 AM   Which Facility: [facility] Date: 10/28/2020 Preadmission Testing Date: 10/22/2020   Patient's Name: Yann Pardo IV YOB: 1950   Chief complaint/history present illness: Elevated PSA   Current Medication List: Crestor 40 mg oral tablet and metoprolol nascimento-hydrochlorothiaz 100-12.5 mg oral tablet extended release 24 hr   Allergies: NO KNOWN DRUG ALLERGIES   Significant past medical: Heart Disease, High cholesterol, Lung collapse, and Rib fracture   Past Surgical History: Carotid Stent, Colonoscopy, Hernia, and Port Placement   Examination of heart and lungs: Regular rate, rhythm, no murmur, gallop, rub, Breath sounds normal, no distress, and Abdomen soft, non-tender, BSx4 are positive         Past Medical History  Heart Disease; High cholesterol; Lung collapse; Rib fracture         Past Surgical History  Carotid Stent; Colonoscopy; Hernia; Port Placement         Medication List  Cipro 500 mg oral tablet; Crestor 40 mg oral tablet; metoprolol nascimento-hydrochlorothiaz 100-12.5 mg oral tablet extended release 24 hr         Allergy List  NO KNOWN DRUG ALLERGIES       Allergies Reconciled  Family Medical History  Renal Calculus; -Father's Family History Unknown; -Mother's Family History Unknown         Social History  Alcohol (Current some day);  Caffeine (Current some day); Second hand smoke exposure (Never); Tobacco (Former)             Assessment  · Pre-Surgical Orders     V72.84  · Preop testing     V72.84/Z01.818  · Heart disease     429.9/I51.9      Plan  · Orders  o General Urology Surgery Order ([facility]) - V72.84 - 10/28/2020  o EKG (Recording only) (28975) - 429.9/I51.9 - 10/19/2020  o MG Pre-Op Covid-19 Screening (10164) - V72.84/Z01.818 - 10/23/2020   [facility]  · Medications  o Medications have been Reconciled  o Transition of Care or Provider Policy  · Instructions  o *****Surgical Orders******  o Pre-Operative Orders: Sign permit for MRI Fusion Transrectal Ultrasound Guided Prostate Biopsy  o Outpatient   o Ceftriaxone 1 gram IV OCTOR.  o RISK AND BENEFITS:  o Possible risks/complications, benefits and alternatives to surgical or invasive procedure have been explained to patient and/or legal guardian.  o Electronically Identified Patient Education Materials Provided Electronically            Electronically Signed by: Raymundo Rubio MD -Author on October 19, 2020 11:37:02 AM

## 2021-05-13 NOTE — PROGRESS NOTES
Progress Note      Patient Name: Yann Pardo IV   Patient ID: [id number]   Sex: Male   YOB: 1950    Primary Care Provider: Katalina Abbott MD   Referring Provider: Katalina Abbott MD    Visit Date: 2020    Provider: Raymundo Rubio MD   Location: [facility]   Location Address: [address] [place] [address]  [id number]   Location Phone: [phone]          Chief Complaint  · Pt here for urologic issues       History Of Present Illness  TELEHEALTH TELEPHONE VISIT  Yann Pardo IV is a 70 year old /White male who is presenting for evaluation via telehealth telephone visit. Verbal consent obtained before beginning visit.   Provider spent 8 minutes with the patient during the telehealth visit.   The following staff were present during this visit: Kemi Harrell   Past Medical History/ Overview of Patient Symptoms         70 year old  gentleman here today for prostate cancer screening     Follows up after recent MRI fusion prostate biopsy    Doing well.  Some GH.  No burning or dyuria.    Voiding without issue.  No history of prostate meds    No GH    PVR       000    No urologic family history.  No family history of prostate cancer.    Mother  at 92 and father  at 86.    s/p cardiac stent.  Patient does not smoke. Baby aspirin daily.    PSA history      10/28/2020 MRI fusion prostate biopsyright base focal high-grade PIN, negative otherwise    10/20 prostate MRI  49 g - lesion in the right posterior mid to base peripheral zone PIRADS 4 - 11 mm .  More indeterminate lesion in the anterior mid left peripheral zone 16mm, PIRADS 3      9.51  3/20 prostate biopsy - 49 g -  neg     6.89       5.4, percent free 20% (23%)     6.09  11/10  1.8  10/09  1.6      1.7         Past Medical History  Elevated PSA; Heart Disease; High cholesterol; Lung collapse; Rib fracture         Past Surgical History  Carotid Stent;  Colonoscopy; Hernia; Port Placement; Prostate biopsy, transrectal         Medication List  Crestor 40 mg oral tablet; metoprolol nascimento-hydrochlorothiaz 100-12.5 mg oral tablet extended release 24 hr         Allergy List  NO KNOWN DRUG ALLERGIES         Family Medical History  Renal Calculus; -Father's Family History Unknown; -Mother's Family History Unknown         Social History  Alcohol (Current some day); Caffeine (Current some day); Second hand smoke exposure (Never); Tobacco (Former)         Review of Systems  · Constitutional  o Denies  o : chills  · Respiratory  o Denies  o : cough  · Gastrointestinal  o Denies  o : nausea              Assessment  · Prostate cancer screening     V76.44/Z12.5  · Elevated PSA     790.93/R97.20      Plan  · Orders  o Physician Telephone Evaluation, 11-20 minutes (82379, 56259) - V76.44/Z12.5, 790.93/R97.20 - 11/13/2020  · Medications  o Medications have been Reconciled  o Transition of Care or Provider Policy  · Instructions  o Electronically Identified Patient Education Materials Provided Electronically       Pathology discussed today.    Follow-up in 6 months with a PSA before.  Patient understands we are continuing to rule out prostatic malignancy and must follow-up.               Electronically Signed by: Raymundo Rubio MD -Author on November 13, 2020 09:39:07 AM

## 2021-05-13 NOTE — PROGRESS NOTES
Progress Note      Patient Name: Yann Pardo IV   Patient ID: [id number]   Sex: Male   YOB: 1950    Primary Care Provider: Katalina Abbott MD   Referring Provider: Katalina Abbott MD    Visit Date: 2020    Provider: Raymundo Rubio MD   Location: [facility]   Location Address: [address] [place] [address]  [id number]   Location Phone: [phone]          Chief Complaint  · pt here for urologic issues      History Of Present Illness  TELEHEALTH TELEPHONE VISIT  Yann Pardo IV is a 70 year old /White male who is presenting for evaluation via telehealth telephone visit. Verbal consent obtained before beginning visit.   Provider spent 11 minutes with the patient during the telehealth visit.   The following staff were present during this visit: Kemi Harrell   Past Medical History/ Overview of Patient Symptoms     70 year old  gentleman here today for prostate cancer screening     Voiding without issue.  No history of prostate meds    No GH    PVR       000    No urologic family history,   Has never had any urologic surgery.  No family history of prostate cancer.    Mother  at 92 and father  at 86.    s/p cardiac stent.  Patient does not smoke. Baby aspirin daily.    PSA history        9.51  3/20 prostate [id number] g -  neg     6.89       5.4, percent free 20% (23%)    6.09  11/10  1.8  10/09  1.6      1.7         Past Medical History  Heart Disease; High cholesterol; Lung collapse; Rib fracture         Past Surgical History  Carotid Stent; Colonoscopy; Hernia; Port Placement         Medication List  Crestor 40 mg oral tablet; metoprolol nascimento-hydrochlorothiaz 100-12.5 mg oral tablet extended release 24 hr         Allergy List  NO KNOWN DRUG ALLERGIES         Family Medical History  Renal Calculus; -Father's Family History Unknown; -Mother's Family History Unknown         Social History  Alcohol (Current some  day); Caffeine (Current some day); Second hand smoke exposure (Never); Tobacco (Former)         Review of Systems  · Constitutional  o Denies  o : chills  · Respiratory  o Denies  o : cough  · Gastrointestinal  o Denies  o : nausea              Assessment  · Prostate cancer screening     V76.44/Z12.5  · Elevated PSA     790.93/R97.20    Problems Reconciled  Plan  · Orders  o Physician Telephone Evaluation, 11-20 minutes (93352) - V76.44/Z12.5, 790.93/R97.20 - 09/18/2020  o MRI prostate wo then w contrast (26422) - 790.93/R97.20 - 10/04/2020   Scheduled 10/4/20 at 0700 at [facility]  · Medications  o Medications have been Reconciled  o Transition of Care or Provider Policy  · Instructions  o Plan Of Care:   o Electronically Identified Patient Education Materials Provided Electronically       Patient's PSA has increased.  After discussion I will go ahead and get him scheduled for MRI prostate.    Patient understands will rule out a malignancy must follow-up             Electronically Signed by: Raymundo Rubio MD -Author on September 18, 2020 03:53:50 PM

## 2021-05-13 NOTE — PROGRESS NOTES
Progress Note      Patient Name: Yann Pardo IV   Patient ID: [id number]   Sex: Male   YOB: 1950    Primary Care Provider: Katalina Abbott MD   Referring Provider: Katalina Abbott MD    Visit Date: 2020    Provider: Raymundo Rubio MD   Location: [facility]   Location Address: [address] [place] [address]  [id number]   Location Phone: [phone]          Chief Complaint  · Pt here for urologic issues       History Of Present Illness  TELEHEALTH TELEPHONE VISIT  Yann Pardo IV is a 70 year old /White male who is presenting for evaluation via telehealth telephone visit. Verbal consent obtained before beginning visit.   Provider spent 11 minutes with the patient during the telehealth visit.   The following staff were present during this visit: Kemi Harrell   Past Medical History/ Overview of Patient Symptoms     Phone visit today    11 minutes used on this call    Kemi Harrell was present    70 year old  gentleman here today for prostate cancer screening     No Changes, follows up today with MRI prostate    Voiding without issue.  No history of prostate meds    No GH    PVR       000    No urologic family history.  No family history of prostate cancer.    Mother  at 92 and father  at 86.    s/p cardiac stent.  Patient does not smoke. Baby aspirin daily.    PSA history    10/20 prostate MRI  49 g - lesion in the right posterior mid to base peripheral zone PIRADS 4 - 11 mm .  More indeterminate lesion in the anterior mid left peripheral zone 16mm, PIRADS 3      9.51  3/20 prostate biopsy - 49 g -  neg     6.89       5.4, percent free 20% (23%)     6.09  11/10  1.8  10/09  1.6      1.7         Past Medical History  Heart Disease; High cholesterol; Lung collapse; Rib fracture         Past Surgical History  Carotid Stent; Colonoscopy; Hernia; Port Placement         Medication List  Crestor 40 mg oral tablet;  metoprolol nascimento-hydrochlorothiaz 100-12.5 mg oral tablet extended release 24 hr         Allergy List  NO KNOWN DRUG ALLERGIES       Allergies Reconciled  Family Medical History  Renal Calculus; -Father's Family History Unknown; -Mother's Family History Unknown         Social History  Alcohol (Current some day); Caffeine (Current some day); Second hand smoke exposure (Never); Tobacco (Former)         Review of Systems  · Constitutional  o Denies  o : chills, fever  · Gastrointestinal  o Denies  o : nausea, vomiting          Assessment  · Prostate cancer screening     V76.44/Z12.5  · Elevated PSA     790.93/R97.20      Plan  · Orders  o Physician Telephone Evaluation, 11-20 minutes (60902) - V76.44/Z12.5, 790.93/R97.20 - 10/19/2020  · Medications  o Medications have been Reconciled  o Transition of Care or Provider Policy  · Instructions  o Electronically Identified Patient Education Materials Provided Electronically       Patient with PIRADS 4 lesion on recent MRI prostate.  I did recommend MRI fusion transrectal ultrasound-guided prostate biopsy    Discussed the natural history of prostate cancer and also prostate cancer screening.  We discussed his elevated PSA.  After risk and benefits were discussed the patient would like to proceed with prostate biopsy.  Risk of bleeding in the urine/semen/stool was discussed and also the 3% risk of sepsis.  We discussed the risk of severe rectal bleeding and also the risk of urinary retention. Patient voiced understanding and would like to proceed.    3 days ciprofloxacin given to be taken jori-procedural    Risks and benefits were discussed including bleeding, infection and damage to the urinary system.  We also discussed the risk of anesthesia up to and including death.  Patient voiced understanding and would like to proceed.                  Electronically Signed by: Raymundo Rubio MD -Author on October 19, 2020 10:14:45 AM

## 2021-05-14 ENCOUNTER — OFFICE VISIT CONVERTED (OUTPATIENT)
Dept: UROLOGY | Facility: CLINIC | Age: 71
End: 2021-05-14
Attending: UROLOGY

## 2021-05-15 VITALS — WEIGHT: 188 LBS | HEIGHT: 68 IN | BODY MASS INDEX: 28.49 KG/M2 | RESPIRATION RATE: 16 BRPM

## 2021-05-15 VITALS — WEIGHT: 172 LBS | HEIGHT: 68 IN | RESPIRATION RATE: 17 BRPM | BODY MASS INDEX: 26.07 KG/M2

## 2021-05-16 VITALS — RESPIRATION RATE: 20 BRPM | BODY MASS INDEX: 27.79 KG/M2 | WEIGHT: 183.37 LBS | HEIGHT: 68 IN

## 2021-05-22 ENCOUNTER — TRANSCRIBE ORDERS (OUTPATIENT)
Dept: CARDIAC REHAB | Facility: HOSPITAL | Age: 71
End: 2021-05-22

## 2021-05-22 DIAGNOSIS — Z95.5 S/P DRUG ELUTING CORONARY STENT PLACEMENT: Primary | ICD-10-CM

## 2021-05-28 VITALS
HEIGHT: 68 IN | DIASTOLIC BLOOD PRESSURE: 67 MMHG | HEART RATE: 66 BPM | OXYGEN SATURATION: 96 % | RESPIRATION RATE: 12 BRPM | SYSTOLIC BLOOD PRESSURE: 135 MMHG | BODY MASS INDEX: 28.21 KG/M2 | TEMPERATURE: 98.1 F | WEIGHT: 186.12 LBS

## 2021-05-28 NOTE — PROGRESS NOTES
Patient: JUDE BRAY     Acct: [id number]     Report: #[id number]  UNIT #: [id number]     : 1950    Encounter Date:2018  PRIMARY CARE: Katalina Abbott  ***Signed***  --------------------------------------------------------------------------------------------------------------------  Chief Complaint      Encounter Date      Sep 5, 2018            Primary Care Provider      Katalina Abbott            Referring Provider      Katalina Abbott            Patient Complaint      Patient is complaining of      hmh f/u right pneumothorax            TRANSITION OF CARE 14D      Transition of Care      [facility] 14 Day Followup      Jude presents to office for follow up post discharge from inpatient status     within 14 calendar days. Patient was contacted within 2 business days via phone     conversation. Documentation of that phone call is present in the patient's     electronic chart. He  was admitted to inpatient facility on (18) and was     discharged on (18) due to:  .right pneumothorax            Admitting MD: MICKI      His  discharge summary has been reviewed and placed in the patient's electronic     chart.            Problem List      Problem List Reviewed      Patient's problem list has been reviewed from patient discharge summary.            Medications Reviewed      Meds Reviewed      Patient €™s outpatient medication list has been reconciled with the medication     list from the discharge summary and has been reviewed with the patient.            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             The patient presented to the hospital on 08/23/18 after being directly admitted     by Dr. Abbott.  He was with his daughter the week prior playing pickleball up in     [place] and feel outstretched along his right ribs. His evaluation in the     emergency room in [place] found some right sided rib fractures but no     pneumothorax. He was given some NSAIDS and sent home. The patient had     progressive respiratory symptoms including shortness of breath, chest pain and     dyspnea. He saw Dr. Abbott in the office and was completely diminished throughout     the right chest. Chest CT scan showed a very large pneumothorax under tension.     The patient was in the hospital 3 days and chest tube was successfully removed.     Since that visit he has been doing very well. He denies any dyspnea, cough,     wheeze or hemoptysis. His chest pain is better and is over his right ribs at the     fracture site and improving, sharp, intermittent, 1/10 in severity, worse with     twisting and movement and relieved with rest and NSAIDS. He is doing moderate     exertion and is back into cardiopulmonary rehab and walking on the treadmill. He     is asking when he can go back to full activity. He denies any other respiratory     symptoms. He denies any nausea and vomiting, fevers or chills, headaches or     hemoptysis. He is a never smoker. He is able to perform his activities of daily     living without difficulty and denies any swollen lymph nodes or glands in his     head and neck.              I have personally reviewed the Review of Systems, past family, social, surgical     and medical histories and I agree with the findings.            ROS      Constitutional:  Denies: Fatigue, Fever, Weight gain, Weight loss, Chills,     Insomnia, Other      Respiratory/Breathing:  Denies: Shortness of air, Wheezing, Cough, Hemoptysis,     Pleuritic pain, Other      Endocrine:  Denies: Polydipsia, Polyuria,  Heat/cold intolerance, Diabetes, Other      Eyes:  Denies: Blurred vision, Vision Changes, Other      Ears, nose, mouth, throat:  Denies: Congestion, Dysphagia, Hearing Changes, Nose     Bleeding, Nasal Discharge, Throat pain, Tinnitus, Other      Cardiovascular:  Denies: Chest Pain, Exertional dyspnea, Peripheral Edema,     Palpitations, Syncope, Wake up Gasping for air, Orthopnea, Tachycardia, Other      Gastrointestinal:  Denies: Abdominal pain/cramping, Bloody stools, Constipation,     Diarrhea, Melena, Nausea, Vomiting, Other      Genitourinary:  Denies: Dysuria, Urinary frequency, Incontinence, Hematuria,     Urgency, Other      Musculoskeletal:  Denies: Joint Pain, Joint Stiffness, Joint Swelling, Myalgias,     Other      Hematologic/lymphatic:  DENIES: Lymphadenopathy, Bruising, Bleeding tendencies,     Other      Neurologic:  Denies: Headache, Numbness, Weakness, Seizures, Other      Psychiatric:  Denies: Anxiety, Appropriate Effect, Depression, Other      Sleep:  No: Excessive daytime sleep, Morning Headache?, Snoring, Insomnia?, Stop     breathing at sleep?, Other      Integumentary:  Denies: Rash, Dry skin, Skin Warm to Touch, Other            FAMILY/SOCIAL/MEDICAL HX      Surgical History:  Yes: Abdominal Surgery (BILATERAL INGUINAL HERNIA REPAIR),     Bowel Surgery (COLONOSCOPY WITH PYLOPS REMOVED), Other Surgeries (STENT 2005);     No: Head Surgery, Oral Surgery      Is Father Still Living?:  No      Is Mother Still Living?:  No       Family History:  Yes      Social History:  No Tobacco Use, No Alcohol Use, No Recreational Drug use      Smoking status:  Former smoker (.5 PPD X 3 YR QUIT 1973)      Anticoagulation Therapy:  No      Antibiotic Prophylaxis:  No      Medical History:  Yes: Hemorrhoids/Rectal Prob (COLON POLYPS), Hiatal Hernia;     No: Arthritis, Blood Disease, Chemotherapy/Cancer, Congestive Heart Failu,     Deafness or Ringing Ears, Diabetes, Seizures, Heart Attack, High Blood Pressure,      Shortness Of Breath, Miscellaneous Medical/oth      Psychiatric History      NONE            PREVENTION      Hx Influenza Vaccination:  Yes      Date Influenza Vaccine Given:  Oct 1, 2017      Influenza Vaccine Declined:  No      2 or More Falls Past Year?:  No      Fall Past Year with Injury?:  No      Hx Pneumococcal Vaccination:  Yes      Encouraged to follow-up with:  PCP regarding preventative exams.      Chart initiated by      PRISCILA BOWERS            ALLERGIES/MEDICATIONS      Allergies:        Coded Allergies:             *No Known Allergies (Verified  Allergy, Mild, 9/5/18)           NO KNOWN DRUG ALLERGIES (Verified  Allergy, Unknown, 9/5/18)      Medications    Last Reconciled on 9/5/18 16:04 by THOMAS BELTRAN MD      Metoprolol Tartrate (Metoprolol Tartrate*) 25 Mg Tablet      12.5 MG PO QDAY, #30 TAB 0 Refills         Reported         8/24/18       Budesonide/Formoterol Fumarate (Symbicort 160/4.5 Mcg) 10.2 Gm Inh      2 PUFF INH RTBID, #1 INH 0 Refills         Prov: Katalina Abbott         8/23/17       Montelukast Sodium (Montelukast*) 10 Mg Tablet      10 MG PO HS, #30 TAB 5 Refills         Prov: Katalina Abbott         8/23/17       Levothyroxine (Synthroid) 25 Mcg Tablet      0.025 MG PO QDAY@07, #30 TAB 0 Refills         Reported         8/21/17       Rosuvastatin Calcium (Crestor*) 40 Mg Tablet      40 MG PO HS, #30 TAB 0 Refills         Reported         8/21/17       Aspirin (Aspirin Low-Strength 81 Mg) 81 Mg Tab      81 MG PO QDAY         Reported         6/12/09       Nitroglycerin (Nitroglycerin) 0.4 Mg Tablet      0.04 MG SL PRN         Reported         6/12/09      Current Medications      Current Medications Reviewed 9/5/18            EXAM      Vital Signs Reviewed      Gen: WDWN, Alert, NAD.        HEENT:  PERRL, EOMI.  OP, nares clear, no sinus tenderness.      Neck:  Supple, no JVD, no thyromegaly.      Lymph: No axillary, cervical, supraclavicular lymphadenopathy noted bilaterally.       Chest:  Good aeration, clear to auscultation bilaterally, tympanic to percussion     bilaterally, no work of breathing noted. Chest tube site is well healed, clean,     dry and intact with no oozing.        CV:  RRR, no MGR, pulses 2+, equal.      Abd:  Soft, NT, ND, + BS, no HSM.      EXT:  No clubbing, no cyanosis, no edema, no joint tenderness.       Neuro:  A  Skin: No rashes or lesions.      Vitals      Vitals:             Height 5 ft 8 in / 172.72 cm           Weight 186 lbs 2 oz / 84.131192 kg           BSA 2.03 m2           BMI 28.3 kg/m2           Temperature 98.1 F / 36.72 C - Oral           Pulse 66           Respirations 12           Blood Pressure 135/67 Sitting, Left Arm           Pulse Oximetry 96%, ROOMAIR            REVIEW      Results Reviewed      PCCS Results Reviewed?:  Yes Prev Lab Results, Yes Prev Radiology Results, Yes     Previous [facility] Records      Lab Results      I personally reviewed the patient's labs showing peripheral eosinophilia with     absolute eosinophil count of 250 and no evidence of chronic hypercapnic     respiratory failure.      Radiographic Results               [facility]                PACS RADIOLOGY REPORT            Patient: JUDE BRAY   Acct: #[id number]   Report: #[id number]            UNIT #: [id number]    DOS: 18      INSURANCE:BLUE ACCESS NETWORK - [facility]   ORDER #:[id number]      LOCATION:[facility]     : 1950            PROVIDERS      ADMITTING:     ATTENDING: Katalina Abbott      FAMILY:  Katalina Abbott   ORDERING:  Katalina Abbott         OTHER:    DICTATING:  Bahman Sparks MD, IV            REQ #:[id number]   EXAM:WO - CT CHEST without CONTRAST      REASON FOR EXAM:        REASON FOR VISIT:  RT THORACIC RIB PAIN            *******Signed******         PROCEDURE:   CT CHEST WITHOUT CONTRAST             COMPARISON:   None.             INDICATIONS:   FELL 18. RIGHT POSTERIOR CHEST  AND RIGHT LATERAL CHEST PAIN.             PROTOCOL:     Standard imaging protocol performed                RADIATION:     DLP: 422.4mGy*cm          Automated exposure control was utilized to minimize radiation dose.              TECHNIQUE:   Axial images of the chest without intravenous contrast.             FINDINGS:      There is a large right pneumothorax with collapse of the right lung.  No     evidence of mediastinal       shift.  The left lung field is clear.  There are no enlarged mediastinal,     axillary or hilar lymph       nodes.  No evidence of significant pleural effusion.  There appear to be stents     in the LAD.  Images       of the unenhanced upper abdomen are unremarkable.  There is a 3 mm noncalcified     nodule in the right       lower lobe.  Subtle nondisplaced right 6th and 7th rib fractures.             IMPRESSION:               1.  Large right pneumothorax with collapse of the right lung.             2.  Subtle nondisplaced right 6th and 7th rib fractures.             3.  3 mm noncalcified right lower lobe nodule. The findings include a single,     incidentally       detected, solid pulmonary nodule, measuring less than 6mm.  2017 guidelines from     the Fleischner       Society for the follow-up and management of incidentally detected indeterminate     pulmonary nodules       in persons at least 35 years of age depend on nodule size (average length and     width) and underlying       risk factors (including smoking and other risk factors). Please consider the     following       recommendations after clinical assessment of risk factors.  For <6mm solid     nodules: In low risk       patients, no follow-up required.  If suspicious morphology or upper lobe     location, consider 12       month follow-up.  In high risk patients, optional CT in 12 months.               This report was communicated by telephone to Dr. Abbott at the dictation time     shown below.                DAVIN SIMMS MD              Electronically Signed and Approved By: DAVIN SIMMS MD on 2018 at 16:09                   Until signed, this is an unconfirmed preliminary report that may contain      errors and is subject to change.                                              KALANI:      D:18 1609                     [facility]                PACS RADIOLOGY REPORT            Patient: JUDE BRAY   Acct: #[id number]   Report: #[id number]            UNIT #: [id number]    DOS: 18 0735      INSURANCE:BLUE ACCESS NETWORK - [facility]   ORDER #:[id number]      LOCATION:[facility]     : 1950            PROVIDERS      ADMITTING:     ATTENDING: Katalina Abbott      FAMILY:  Katalina Abbott   ORDERING:  Katalina Abbott         OTHER: THOMAS BELTRAN   DICTATING:  Harrison Avilez MD            REQ #:[id number]   EXAM:CXR2 - CHEST 2V AP PA LAT      REASON FOR EXAM:        REASON FOR VISIT:  PHEUMOTHORAX            *******Signed******         PROCEDURE:   CHEST AP/PA AND LATERAL             COMPARISON:   [facility], , CHEST PA/AP   12:34.             INDICATIONS:   PNEUMOTHORAX RIGHT LUNG WITH FX RIB FROM FALL ON .NO     COMPLAINTS TODAY.             FINDINGS:         There is some atelectasis in the left basilar area and probably right mid and     lower lung zones.        The heart is not definitely enlarged.  There are no pleural effusions.             CONCLUSION:         1. Atelectatic changes are suggested involving the lungs.              HARRISON AVILEZ MD             Electronically Signed and Approved By: HARRISON AVILEZ MD on 2018 at 8:05                        Until signed, this is an unconfirmed preliminary report that may contain      errors and is subject to change.                                              OCBYCR:      D:18 0805                     [facility]                PACS  RADIOLOGY REPORT            Patient: JUDE BRAY   Acct: #[id number]   Report: #[id number]            UNIT #: [id number]    DOS: 18 1523      INSURANCE:BLUE ACCESS NETWORK - [facility]   ORDER #:[id number]      LOCATION:[facility]     : 1950            PROVIDERS      ADMITTING:     ATTENDING: Katalina Abbott      FAMILY:  Katalina Abbott   ORDERING:  Katalina Abbott         OTHER:    DICTATING:  FINN MEDRANO MD            REQ #:[id number]   EXAM:ABDWO - CT ABDOMEN without CONTRAST      REASON FOR EXAM:        REASON FOR VISIT:  ACUTE LT UPPER QUAD PAIN WITH FALL            *******Signed******         PROCEDURE:   CT ABDOMEN WITHOUT CONTRAST             COMPARISON:   None.             INDICATIONS:   FELL 18. INCREASING LUQ PAIN. ORDERED NO IV CONTRAST.             TECHNIQUE:   CT images were created without intravenous contrast.               PROTOCOL:     Standard imaging protocol performed                RADIATION:     DLP: 499.6mGy*cm          Automated exposure control was utilized to minimize radiation dose.              FINDINGS:         Linear scarring in lung bases.             Liver and spleen have an unremarkable unenhanced appearance.  No convincing     evidence for acute       splenic injury on this unenhanced CT.             Small proteinaceous or hemorrhagic cyst in the upper pole the right kidney.      Adrenal glands,       pancreas, gallbladder have an unremarkable unenhanced appearance.  Small hiatal     hernia.  Mild       stranding in the left pericolic gutter adjacent to the mid descending colon.      There is a suspected       focally inflamed diverticulum in this region.  No evidence for abscess.      Appendix is normal.  No       abdominal fluid collection.  No aggressive appearing bone lesion.  No fracture.             CONCLUSION:   No convincing evidence for acute splenic injury on this unenhanced     CT.             Focal acute diverticulitis in the mid descending colon.  No evidence for  abscess     or perforation.             This report was communicated by telephone to Dr. Abbott at the dictation time     shown below.                FINN MEDRANO MD             Electronically Signed and Approved By: FINN MEDRANO MD on 8/29/2018 at 15:40                        Until signed, this is an unconfirmed preliminary report that may contain      errors and is subject to change.                                              JULIETAER:      D:08/29/18 1540            Assessment      IMPRESSION:      1.  Right traumatic pneumothorax resolved.       2. Right sided mildly displaced 6th and 7th rib fractures improving.       3. Right sided musculoskeletal chest pain improving secondary to rib fractures.       4. Dyspnea resolved.       5. Wheezing resolved.       6. Stable noncalcified 3 mm pulmonary nodule.             PLAN:      1. The patient has no symptoms and repeated imaging shows no evidence of     pneumothorax. No further imaging is needed.      2. Continue cardiac rehab and moderate ambulation. I informed the patient that 4     weeks after his rib fractures he can go back to increased strenuous exercise     and would advance to full activity over the course of 6 weeks.       3. Inhaler regimen per Dr. Abbott.      4. No indication for repeat follow up chest CT scan for 3 mm pulmonary nodule as     he is a low risk patient per 2017 Fleischner criteria.       5. Up to date with flu, Prevnar and Pneumovax.      6. Follow up with me on as needed basis.            Patient Education            Patient Education:        Rib Fracture      Other Education:  pneumothorax                 Disclaimer: Converted document may not contain table formatting or lab diagrams. Please see [employer] for the authenticated document.

## 2021-06-05 NOTE — PROGRESS NOTES
Progress Note      Patient Name: Yann Pardo IV   Patient ID: [id number]   Sex: Male   YOB: 1950    Primary Care Provider: Katalina Abbott MD   Referring Provider: Katalina Abbott MD    Visit Date: May 14, 2021    Provider: Raymundo Rubio MD   Location[facility]   Location Address: [address] [place] [address]  [id number]   Location Phone: [phone]          Chief Complaint  · pt here for urologic issue      History Of Present Illness       70 year old  gentleman here today for prostate cancer screening     Voiding ok.  No GH.  No burning or dyuria.    No prostate meds    PVR        No urologic family history.  No family history of prostate cancer.    Mother  at 92 and father  at 86.    s/p cardiac stent.  Patient does not smoke. Baby aspirin daily.    PSA history      6.58    10/28/2020 MRI fusion prostate biopsy - right base focal high-grade PIN, negative otherwise    10/20 prostate MRI  49 g - lesion in the right posterior mid to base peripheral zone PIRADS 4 - 11 mm .  More indeterminate lesion in the anterior mid left peripheral zone 16mm, PIRADS 3      9.51  3/20 prostate biopsy - 49 g -  neg     6.89       5.4, percent free 20% (23%)     6.09  11/10  1.8  10/09  1.6      1.7         Past Medical History  Elevated PSA; Heart Disease; High cholesterol; Lung collapse; Rib fracture         Past Surgical History  Carotid Stent; Colonoscopy; Hernia; Port Placement; Prostate biopsy, transrectal         Medication List  Crestor 40 mg oral tablet; metoprolol nascimento-hydrochlorothiaz 100-12.5 mg oral tablet extended release 24 hr         Allergy List  NO KNOWN DRUG ALLERGIES         Family Medical History  Renal Calculus; -Father's Family History Unknown; -Mother's Family History Unknown         Social History  Alcohol (Current some day); Caffeine (Current some day); Second hand smoke exposure (Never); Tobacco (Former)  "        Review of Systems  · Constitutional  o Denies  o : chills, fever  · Gastrointestinal  o Denies  o : nausea, vomiting      Vitals  Date Time BP Position Site L\R Cuff Size HR RR TEMP (F) WT  HT  BMI kg/m2 BSA m2 O2 Sat FR L/min FiO2 HC       05/14/2021 10:30 AM         188lbs 0oz 5'  8\" 28.59 2.02             Physical Examination  · Constitutional  o Appearance  o : Well-appearing, well-developed, in no acute distress  · Respiratory  o Respiratory Effort  o : Unlabored breathing  · Neurologic  o Mental Status Examination  o :   § Orientation  § : Grossly oriented to person, place and time, judgment and insight intact, normal mood and affect          Assessment  · Prostate cancer screening     V76.44/Z12.5  · Elevated PSA     790.93/R97.20      Plan  · Orders  o MRI prostate wo then w contrast (14857) - V76.44/Z12.5, 790.93/R97.20 - 11/15/2021   scheduled on 11/15/21 @ 7:00am at [facility], arrive at 6:30am   o PSA ultrasensitive DIAGNOSTIC [facility] (29238) - V76.44/Z12.5, 790.93/R97.20 - 05/14/2021  · Medications  o Medications have been Reconciled  o Transition of Care or Provider Policy  · Instructions  o Electronically Identified Patient Education Materials Provided Electronically       Discussed patient's PSA, his previous high-grade PIN and PIRADS 4 lesion.  Discussed with patient I do think we should continue to follow him closely.  I will have him follow-up in 6 months with PSA and MRI prostate    Patient understands we are ruling out a cancer and he must continue to follow this closely             Electronically Signed by: Raymundo Rubio MD -Author on May 14, 2021 11:33:40 AM  "

## 2021-06-08 ENCOUNTER — OFFICE VISIT (OUTPATIENT)
Dept: CARDIAC REHAB | Facility: HOSPITAL | Age: 71
End: 2021-06-08

## 2021-06-08 DIAGNOSIS — T82.855D STENOSIS OF CORONARY ARTERY STENT, SUBSEQUENT ENCOUNTER: Primary | ICD-10-CM

## 2021-06-10 ENCOUNTER — OFFICE VISIT (OUTPATIENT)
Dept: CARDIAC REHAB | Facility: HOSPITAL | Age: 71
End: 2021-06-10

## 2021-06-10 DIAGNOSIS — T82.855D STENOSIS OF CORONARY ARTERY STENT, SUBSEQUENT ENCOUNTER: Primary | ICD-10-CM

## 2021-06-15 ENCOUNTER — OFFICE VISIT (OUTPATIENT)
Dept: CARDIAC REHAB | Facility: HOSPITAL | Age: 71
End: 2021-06-15

## 2021-06-15 DIAGNOSIS — T82.855D STENOSIS OF CORONARY ARTERY STENT, SUBSEQUENT ENCOUNTER: Primary | ICD-10-CM

## 2021-06-17 ENCOUNTER — OFFICE VISIT (OUTPATIENT)
Dept: CARDIAC REHAB | Facility: HOSPITAL | Age: 71
End: 2021-06-17

## 2021-06-17 DIAGNOSIS — T82.855D STENOSIS OF CORONARY ARTERY STENT, SUBSEQUENT ENCOUNTER: Primary | ICD-10-CM

## 2021-06-22 ENCOUNTER — OFFICE VISIT (OUTPATIENT)
Dept: CARDIAC REHAB | Facility: HOSPITAL | Age: 71
End: 2021-06-22

## 2021-06-22 DIAGNOSIS — T82.855D STENOSIS OF CORONARY ARTERY STENT, SUBSEQUENT ENCOUNTER: Primary | ICD-10-CM

## 2021-06-22 NOTE — PROGRESS NOTES
Tolerated Session Well.  Patient blood pressure elevated more than usual.  Will recheck prior to exit

## 2021-06-24 ENCOUNTER — OFFICE VISIT (OUTPATIENT)
Dept: CARDIAC REHAB | Facility: HOSPITAL | Age: 71
End: 2021-06-24

## 2021-06-24 DIAGNOSIS — T82.855D STENOSIS OF CORONARY ARTERY STENT, SUBSEQUENT ENCOUNTER: Primary | ICD-10-CM

## 2021-06-29 ENCOUNTER — OFFICE VISIT (OUTPATIENT)
Dept: CARDIAC REHAB | Facility: HOSPITAL | Age: 71
End: 2021-06-29

## 2021-06-29 DIAGNOSIS — T82.855D STENOSIS OF CORONARY ARTERY STENT, SUBSEQUENT ENCOUNTER: Primary | ICD-10-CM

## 2021-07-01 ENCOUNTER — OFFICE VISIT (OUTPATIENT)
Dept: CARDIAC REHAB | Facility: HOSPITAL | Age: 71
End: 2021-07-01

## 2021-07-01 DIAGNOSIS — T82.855D STENOSIS OF CORONARY ARTERY STENT, SUBSEQUENT ENCOUNTER: Primary | ICD-10-CM

## 2021-07-06 ENCOUNTER — OFFICE VISIT (OUTPATIENT)
Dept: CARDIAC REHAB | Facility: HOSPITAL | Age: 71
End: 2021-07-06

## 2021-07-06 DIAGNOSIS — T82.855D STENOSIS OF CORONARY ARTERY STENT, SUBSEQUENT ENCOUNTER: Primary | ICD-10-CM

## 2021-07-08 ENCOUNTER — OFFICE VISIT (OUTPATIENT)
Dept: CARDIAC REHAB | Facility: HOSPITAL | Age: 71
End: 2021-07-08

## 2021-07-08 DIAGNOSIS — T82.855D STENOSIS OF CORONARY ARTERY STENT, SUBSEQUENT ENCOUNTER: Primary | ICD-10-CM

## 2021-07-13 ENCOUNTER — OFFICE VISIT (OUTPATIENT)
Dept: CARDIAC REHAB | Facility: HOSPITAL | Age: 71
End: 2021-07-13

## 2021-07-13 DIAGNOSIS — T82.855D STENOSIS OF CORONARY ARTERY STENT, SUBSEQUENT ENCOUNTER: Primary | ICD-10-CM

## 2021-07-15 ENCOUNTER — TRANSCRIBE ORDERS (OUTPATIENT)
Dept: INTERNAL MEDICINE | Facility: CLINIC | Age: 71
End: 2021-07-15

## 2021-07-15 ENCOUNTER — LAB (OUTPATIENT)
Dept: LAB | Facility: HOSPITAL | Age: 71
End: 2021-07-15

## 2021-07-15 VITALS — BODY MASS INDEX: 28.49 KG/M2 | WEIGHT: 188 LBS | HEIGHT: 68 IN

## 2021-07-15 DIAGNOSIS — Z79.899 ENCOUNTER FOR LONG-TERM (CURRENT) USE OF OTHER MEDICATIONS: ICD-10-CM

## 2021-07-15 DIAGNOSIS — E03.9 MYXEDEMA HEART DISEASE: ICD-10-CM

## 2021-07-15 DIAGNOSIS — R53.83 TIREDNESS: Primary | ICD-10-CM

## 2021-07-15 DIAGNOSIS — I51.9 MYXEDEMA HEART DISEASE: ICD-10-CM

## 2021-07-15 DIAGNOSIS — E55.9 VITAMIN D DEFICIENCY DISEASE: ICD-10-CM

## 2021-07-15 DIAGNOSIS — E78.5 HYPERLIPIDEMIA, UNSPECIFIED HYPERLIPIDEMIA TYPE: ICD-10-CM

## 2021-07-15 DIAGNOSIS — R53.83 TIREDNESS: ICD-10-CM

## 2021-07-15 LAB
25(OH)D3 SERPL-MCNC: 28.2 NG/ML (ref 30–100)
ALBUMIN SERPL-MCNC: 4.2 G/DL (ref 3.5–5.2)
ALBUMIN/GLOB SERPL: 2.1 G/DL
ALP SERPL-CCNC: 61 U/L (ref 39–117)
ALT SERPL W P-5'-P-CCNC: 37 U/L (ref 1–41)
ANION GAP SERPL CALCULATED.3IONS-SCNC: 8.8 MMOL/L (ref 5–15)
AST SERPL-CCNC: 28 U/L (ref 1–40)
BASOPHILS # BLD AUTO: 0.07 10*3/MM3 (ref 0–0.2)
BASOPHILS NFR BLD AUTO: 1.4 % (ref 0–1.5)
BILIRUB SERPL-MCNC: 0.2 MG/DL (ref 0–1.2)
BUN SERPL-MCNC: 19 MG/DL (ref 8–23)
BUN/CREAT SERPL: 16.7 (ref 7–25)
CALCIUM SPEC-SCNC: 9.1 MG/DL (ref 8.6–10.5)
CHLORIDE SERPL-SCNC: 109 MMOL/L (ref 98–107)
CHOLEST SERPL-MCNC: 146 MG/DL (ref 0–200)
CO2 SERPL-SCNC: 25.2 MMOL/L (ref 22–29)
CREAT SERPL-MCNC: 1.14 MG/DL (ref 0.76–1.27)
DEPRECATED RDW RBC AUTO: 40.6 FL (ref 37–54)
EOSINOPHIL # BLD AUTO: 0.11 10*3/MM3 (ref 0–0.4)
EOSINOPHIL NFR BLD AUTO: 2.2 % (ref 0.3–6.2)
ERYTHROCYTE [DISTWIDTH] IN BLOOD BY AUTOMATED COUNT: 12.5 % (ref 12.3–15.4)
FOLATE SERPL-MCNC: 6.15 NG/ML (ref 4.78–24.2)
GFR SERPL CREATININE-BSD FRML MDRD: 63 ML/MIN/1.73
GLOBULIN UR ELPH-MCNC: 2 GM/DL
GLUCOSE SERPL-MCNC: 102 MG/DL (ref 65–99)
HBA1C MFR BLD: 5.86 % (ref 4.8–5.6)
HCT VFR BLD AUTO: 43.7 % (ref 37.5–51)
HDLC SERPL-MCNC: 36 MG/DL (ref 40–60)
HGB BLD-MCNC: 14.8 G/DL (ref 13–17.7)
IMM GRANULOCYTES # BLD AUTO: 0.02 10*3/MM3 (ref 0–0.05)
IMM GRANULOCYTES NFR BLD AUTO: 0.4 % (ref 0–0.5)
LDLC SERPL CALC-MCNC: 71 MG/DL (ref 0–100)
LDLC/HDLC SERPL: 1.74 {RATIO}
LYMPHOCYTES # BLD AUTO: 1.65 10*3/MM3 (ref 0.7–3.1)
LYMPHOCYTES NFR BLD AUTO: 33.1 % (ref 19.6–45.3)
MAGNESIUM SERPL-MCNC: 1.9 MG/DL (ref 1.6–2.4)
MCH RBC QN AUTO: 30.5 PG (ref 26.6–33)
MCHC RBC AUTO-ENTMCNC: 33.9 G/DL (ref 31.5–35.7)
MCV RBC AUTO: 90.1 FL (ref 79–97)
MONOCYTES # BLD AUTO: 0.53 10*3/MM3 (ref 0.1–0.9)
MONOCYTES NFR BLD AUTO: 10.6 % (ref 5–12)
NEUTROPHILS NFR BLD AUTO: 2.6 10*3/MM3 (ref 1.7–7)
NEUTROPHILS NFR BLD AUTO: 52.3 % (ref 42.7–76)
NRBC BLD AUTO-RTO: 0 /100 WBC (ref 0–0.2)
PLATELET # BLD AUTO: 172 10*3/MM3 (ref 140–450)
PMV BLD AUTO: 10.2 FL (ref 6–12)
POTASSIUM SERPL-SCNC: 4.4 MMOL/L (ref 3.5–5.2)
PROT SERPL-MCNC: 6.2 G/DL (ref 6–8.5)
RBC # BLD AUTO: 4.85 10*6/MM3 (ref 4.14–5.8)
SODIUM SERPL-SCNC: 143 MMOL/L (ref 136–145)
T4 FREE SERPL-MCNC: 1.04 NG/DL (ref 0.93–1.7)
TRIGL SERPL-MCNC: 237 MG/DL (ref 0–150)
TSH SERPL DL<=0.05 MIU/L-ACNC: 6.19 UIU/ML (ref 0.27–4.2)
VIT B12 BLD-MCNC: 411 PG/ML (ref 211–946)
VLDLC SERPL-MCNC: 39 MG/DL (ref 5–40)
WBC # BLD AUTO: 4.98 10*3/MM3 (ref 3.4–10.8)

## 2021-07-15 PROCEDURE — 84443 ASSAY THYROID STIM HORMONE: CPT

## 2021-07-15 PROCEDURE — 82607 VITAMIN B-12: CPT

## 2021-07-15 PROCEDURE — 80061 LIPID PANEL: CPT

## 2021-07-15 PROCEDURE — 80053 COMPREHEN METABOLIC PANEL: CPT

## 2021-07-15 PROCEDURE — 83735 ASSAY OF MAGNESIUM: CPT

## 2021-07-15 PROCEDURE — 82746 ASSAY OF FOLIC ACID SERUM: CPT

## 2021-07-15 PROCEDURE — 83036 HEMOGLOBIN GLYCOSYLATED A1C: CPT

## 2021-07-15 PROCEDURE — 36415 COLL VENOUS BLD VENIPUNCTURE: CPT

## 2021-07-15 PROCEDURE — 85025 COMPLETE CBC W/AUTO DIFF WBC: CPT

## 2021-07-15 PROCEDURE — 84439 ASSAY OF FREE THYROXINE: CPT

## 2021-07-15 PROCEDURE — 82306 VITAMIN D 25 HYDROXY: CPT

## 2021-07-19 PROBLEM — S22.39XA RIB FRACTURE: Status: ACTIVE | Noted: 2021-07-19

## 2021-07-19 PROBLEM — I51.9 HEART DISEASE: Status: ACTIVE | Noted: 2021-07-19

## 2021-07-19 PROBLEM — J98.11 PULMONARY ATELECTASIS: Status: ACTIVE | Noted: 2021-07-19

## 2021-07-19 PROBLEM — R97.20 ELEVATED PSA: Status: ACTIVE | Noted: 2021-07-19

## 2021-07-19 PROBLEM — E78.00 HIGH CHOLESTEROL: Status: ACTIVE | Noted: 2021-07-19

## 2021-07-19 RX ORDER — METOPROLOL SUCCINATE AND HYDROCHLOROTHIAZIDE 12.5; 1 MG/1; MG/1
TABLET ORAL
COMMUNITY
End: 2021-07-21

## 2021-07-19 RX ORDER — ATORVASTATIN CALCIUM 80 MG/1
TABLET, FILM COATED ORAL
COMMUNITY
End: 2021-07-21

## 2021-07-19 RX ORDER — CLOPIDOGREL BISULFATE 75 MG/1
TABLET ORAL
COMMUNITY
End: 2021-07-21

## 2021-07-19 RX ORDER — LANSOPRAZOLE 30 MG/1
CAPSULE, DELAYED RELEASE ORAL
COMMUNITY
End: 2021-07-21

## 2021-07-19 RX ORDER — METOPROLOL SUCCINATE 25 MG/1
12.5 TABLET, EXTENDED RELEASE ORAL DAILY
COMMUNITY
Start: 2021-05-24 | End: 2022-05-25

## 2021-07-19 RX ORDER — NITROGLYCERIN 400 UG/1
1 SPRAY ORAL
COMMUNITY
End: 2021-08-13 | Stop reason: SDUPTHER

## 2021-07-19 RX ORDER — ROSUVASTATIN CALCIUM 40 MG/1
40 TABLET, COATED ORAL
COMMUNITY
End: 2022-07-05

## 2021-07-19 RX ORDER — ASPIRIN 81 MG/1
81 TABLET ORAL DAILY
COMMUNITY

## 2021-07-19 RX ORDER — LEVOTHYROXINE SODIUM 0.03 MG/1
25 TABLET ORAL
COMMUNITY
End: 2021-07-21

## 2021-07-19 RX ORDER — OMEGA-3-ACID ETHYL ESTERS 1 G/1
CAPSULE, LIQUID FILLED ORAL
COMMUNITY
End: 2021-07-21

## 2021-07-20 ENCOUNTER — OFFICE VISIT (OUTPATIENT)
Dept: CARDIAC REHAB | Facility: HOSPITAL | Age: 71
End: 2021-07-20

## 2021-07-20 DIAGNOSIS — T82.855D STENOSIS OF CORONARY ARTERY STENT, SUBSEQUENT ENCOUNTER: Primary | ICD-10-CM

## 2021-07-21 ENCOUNTER — OFFICE VISIT (OUTPATIENT)
Dept: INTERNAL MEDICINE | Facility: CLINIC | Age: 71
End: 2021-07-21

## 2021-07-21 VITALS
SYSTOLIC BLOOD PRESSURE: 148 MMHG | DIASTOLIC BLOOD PRESSURE: 70 MMHG | WEIGHT: 187.2 LBS | HEART RATE: 60 BPM | RESPIRATION RATE: 20 BRPM | BODY MASS INDEX: 28.37 KG/M2 | HEIGHT: 68 IN | TEMPERATURE: 98 F

## 2021-07-21 DIAGNOSIS — I25.10 CORONARY ARTERY DISEASE INVOLVING NATIVE CORONARY ARTERY OF NATIVE HEART WITHOUT ANGINA PECTORIS: Primary | ICD-10-CM

## 2021-07-21 DIAGNOSIS — R97.20 ELEVATED PSA: ICD-10-CM

## 2021-07-21 DIAGNOSIS — E03.8 SUBCLINICAL HYPOTHYROIDISM: ICD-10-CM

## 2021-07-21 DIAGNOSIS — E78.00 HIGH CHOLESTEROL: ICD-10-CM

## 2021-07-21 PROCEDURE — 99213 OFFICE O/P EST LOW 20 MIN: CPT | Performed by: INTERNAL MEDICINE

## 2021-07-21 NOTE — PROGRESS NOTES
"Chief Complaint  Labs Only (4 MONTH LAB FOLLOW-UP. PT STATES OVERALL WELLNESS.NO CONCERNS AT THIS TIME. )    Subjective      Yann Pardo presents to [facility] INTER[facility] MEDIC[facility]  History of Present Illness patient has been feeling well.  No chest pain shortness of breath.  Going hunting in the [employer] in [place] for [employer] this fall.  Still working at the bank.  Still goes to cardiac rehab.  Follows with cardiology.  Follows with Dr. Rubio, urology for abnormal PSA and prostate.  Had a normal stress echo recently by Dr. Boo at [facility].  I reviewed the report.  Objective   Vital Signs:   /70 (BP Location: Left arm, Patient Position: Sitting, Cuff Size: Adult)   Pulse 60   Temp 98 °F (36.7 °C) (Temporal)   Resp 20   Ht 172.7 cm (68\")   Wt 84.9 kg (187 lb 3.2 oz)   BMI 28.46 kg/m²     Physical Exam  Vitals and nursing note reviewed.   Constitutional:       Appearance: Normal appearance.   HENT:      Head: Normocephalic and atraumatic.   Eyes:      Extraocular Movements: Extraocular movements intact.   Cardiovascular:      Rate and Rhythm: Normal rate and regular rhythm.      Pulses: Normal pulses.      Heart sounds: Normal heart sounds. No murmur heard.   No gallop.    Pulmonary:      Effort: Pulmonary effort is normal.      Breath sounds: Normal breath sounds. No wheezing.   Abdominal:      General: Abdomen is flat. Bowel sounds are normal.      Palpations: Abdomen is soft.      Tenderness: There is no abdominal tenderness. There is no guarding.   Musculoskeletal:         General: Normal range of motion.      Cervical back: Normal range of motion.   Skin:     General: Skin is warm and dry.   Neurological:      General: No focal deficit present.      Mental Status: He is alert and oriented to person, place, and time. Mental status is at baseline.   Psychiatric:         Mood and Affect: Mood normal.         Behavior: Behavior normal.         Thought Content: Thought content " normal.         Judgment: Judgment normal.        Result Review :  The following data was reviewed by: Katalina Abbott MD on 07/21/2021:  CMP    CMP 12/2/20 3/4/21 7/15/21   Glucose   102 (A)   Glucose 121 (A) 99    BUN 21 19 19   Creatinine 1.38 (A) 1.32 (A) 1.14   eGFR Non African Am   63   Sodium 144 138 143   Potassium 4.5 4.4 4.4   Chloride 106 104 109 (A)   Calcium 9.0 9.0 9.1   Albumin 4.1 4.0 4.20   Total Bilirubin 0.31 0.34 0.2   Alkaline Phosphatase 60 65 61   AST (SGOT) 34 39 28   ALT (SGPT) 45 (A) 53 (A) 37   (A) Abnormal value            CBC    CBC 12/2/20 3/4/21 7/15/21   WBC 5.11 5.36 4.98   RBC 5.06 4.93 4.85   Hemoglobin 15.1 14.8 14.8   Hematocrit 47.2 45.4 43.7   MCV 93.3 92.1 90.1   MCH 29.8 30.0 30.5   MCHC 32.0 (A) 32.6 (A) 33.9   RDW 12.4 12.3 12.5   Platelets 174 190 172   (A) Abnormal value            Lipid Panel    Lipid Panel 12/2/20 3/4/21 7/15/21   Total Cholesterol   146   Total Cholesterol 145 160    Triglycerides 126 127 237 (A)   HDL Cholesterol 43 40 36 (A)   VLDL Cholesterol 25 25 39   LDL Cholesterol  77 95 71   LDL/HDL Ratio   1.74   (A) Abnormal value       Comments are available for some flowsheets but are not being displayed.           TSH    TSH 12/2/20 3/4/21 7/15/21   TSH 4.300 (A) 7.250 (A) 6.190 (A)   (A) Abnormal value            Most Recent A1C    HGBA1C Most Recent 7/15/21   Hemoglobin A1C 5.86 (A)   (A) Abnormal value                        Assessment and Plan   Diagnoses and all orders for this visit:    1. Coronary artery disease involving native coronary artery of native heart without angina pectoris (Primary)  Assessment & Plan:  Patient follows with Dr. Boo for cardiology.  He has been doing well.  No chest pain.  Goes to cardiac rehab twice a week.  Going hunting this fall.  Going to the [facility].  Had a stress echo done earlier this summer and it was normal with a normal ejection fraction.  I reviewed the report.      2. High  cholesterol  Assessment & Plan:  Patient has been eating more chocolate and sweets.  Triglyceride has gone up significantly since his last visit with a resulting decrease in HDL cholesterol.  That was discussed.  His total cholesterol and LDL are adequate.  Tolerates Crestor 40 mg without difficulty.      3. Elevated PSA  Assessment & Plan:  Continues to follow with Dr. Rubio, urologist for BPH and abnormal PSA.      4. Subclinical hypothyroidism  Assessment & Plan:  Patient feels a little fatigued he was asking about his thyroid function.  Remains in the range is been for the last year.  We discussed that he could takes low-dose Synthroid.  At this time he is going to wait and see how he feels in 4 months when he returns.  He has been subclinically hypothyroid for over a year.          Follow Up No follow-ups on file.  Patient was given instructions and counseling regarding his condition or for health maintenance advice. Please see specific information pulled into the AVS if appropriate.

## 2021-07-21 NOTE — ASSESSMENT & PLAN NOTE
Patient follows with Dr. Boo for cardiology.  He has been doing well.  No chest pain.  Goes to cardiac rehab twice a week.  Going hunting this fall.  Going to the [facility].  Had a stress echo done earlier this summer and it was normal with a normal ejection fraction.  I reviewed the report.

## 2021-07-21 NOTE — ASSESSMENT & PLAN NOTE
Patient feels a little fatigued he was asking about his thyroid function.  Remains in the range is been for the last year.  We discussed that he could takes low-dose Synthroid.  At this time he is going to wait and see how he feels in 4 months when he returns.  He has been subclinically hypothyroid for over a year.

## 2021-07-21 NOTE — ASSESSMENT & PLAN NOTE
Patient has been eating more chocolate and sweets.  Triglyceride has gone up significantly since his last visit with a resulting decrease in HDL cholesterol.  That was discussed.  His total cholesterol and LDL are adequate.  Tolerates Crestor 40 mg without difficulty.

## 2021-07-22 ENCOUNTER — OFFICE VISIT (OUTPATIENT)
Dept: CARDIAC REHAB | Facility: HOSPITAL | Age: 71
End: 2021-07-22

## 2021-07-22 DIAGNOSIS — T82.855D STENOSIS OF CORONARY ARTERY STENT, SUBSEQUENT ENCOUNTER: Primary | ICD-10-CM

## 2021-07-27 ENCOUNTER — OFFICE VISIT (OUTPATIENT)
Dept: CARDIAC REHAB | Facility: HOSPITAL | Age: 71
End: 2021-07-27

## 2021-07-27 DIAGNOSIS — T82.855D STENOSIS OF CORONARY ARTERY STENT, SUBSEQUENT ENCOUNTER: Primary | ICD-10-CM

## 2021-08-03 ENCOUNTER — OFFICE VISIT (OUTPATIENT)
Dept: CARDIAC REHAB | Facility: HOSPITAL | Age: 71
End: 2021-08-03

## 2021-08-03 DIAGNOSIS — T82.855D STENOSIS OF CORONARY ARTERY STENT, SUBSEQUENT ENCOUNTER: Primary | ICD-10-CM

## 2021-08-05 ENCOUNTER — OFFICE VISIT (OUTPATIENT)
Dept: CARDIAC REHAB | Facility: HOSPITAL | Age: 71
End: 2021-08-05

## 2021-08-05 DIAGNOSIS — T82.855D STENOSIS OF CORONARY ARTERY STENT, SUBSEQUENT ENCOUNTER: Primary | ICD-10-CM

## 2021-08-10 ENCOUNTER — OFFICE VISIT (OUTPATIENT)
Dept: CARDIAC REHAB | Facility: HOSPITAL | Age: 71
End: 2021-08-10

## 2021-08-10 DIAGNOSIS — T82.855D STENOSIS OF CORONARY ARTERY STENT, SUBSEQUENT ENCOUNTER: Primary | ICD-10-CM

## 2021-08-12 ENCOUNTER — OFFICE VISIT (OUTPATIENT)
Dept: CARDIAC REHAB | Facility: HOSPITAL | Age: 71
End: 2021-08-12

## 2021-08-12 DIAGNOSIS — T82.855D STENOSIS OF CORONARY ARTERY STENT, SUBSEQUENT ENCOUNTER: Primary | ICD-10-CM

## 2021-08-13 NOTE — TELEPHONE ENCOUNTER
PT WIFE CALLED AND REQUESTED REFILL ON NITRO SPRAY. I AM NOT SURE WHAT TO PUT IN THE QUANTITY OR HOW MANY REFILLS?

## 2021-08-16 RX ORDER — NITROGLYCERIN 400 UG/1
1 SPRAY ORAL
Qty: 1 EACH | Refills: 1 | Status: SHIPPED | OUTPATIENT
Start: 2021-08-16

## 2021-08-17 ENCOUNTER — APPOINTMENT (OUTPATIENT)
Dept: CARDIAC REHAB | Facility: HOSPITAL | Age: 71
End: 2021-08-17

## 2021-08-17 ENCOUNTER — TELEPHONE (OUTPATIENT)
Dept: INTERNAL MEDICINE | Facility: CLINIC | Age: 71
End: 2021-08-17

## 2021-08-17 RX ORDER — NITROGLYCERIN 400 UG/1
1 SPRAY ORAL
Qty: 1 EACH | Refills: 12 | Status: SHIPPED | OUTPATIENT
Start: 2021-08-17 | End: 2022-04-27 | Stop reason: SDUPTHER

## 2021-08-19 ENCOUNTER — APPOINTMENT (OUTPATIENT)
Dept: CARDIAC REHAB | Facility: HOSPITAL | Age: 71
End: 2021-08-19

## 2021-08-24 ENCOUNTER — APPOINTMENT (OUTPATIENT)
Dept: CARDIAC REHAB | Facility: HOSPITAL | Age: 71
End: 2021-08-24

## 2021-08-26 ENCOUNTER — APPOINTMENT (OUTPATIENT)
Dept: CARDIAC REHAB | Facility: HOSPITAL | Age: 71
End: 2021-08-26

## 2021-08-31 ENCOUNTER — APPOINTMENT (OUTPATIENT)
Dept: CARDIAC REHAB | Facility: HOSPITAL | Age: 71
End: 2021-08-31

## 2021-09-02 ENCOUNTER — APPOINTMENT (OUTPATIENT)
Dept: CARDIAC REHAB | Facility: HOSPITAL | Age: 71
End: 2021-09-02

## 2021-09-07 ENCOUNTER — APPOINTMENT (OUTPATIENT)
Dept: CARDIAC REHAB | Facility: HOSPITAL | Age: 71
End: 2021-09-07

## 2021-09-09 ENCOUNTER — APPOINTMENT (OUTPATIENT)
Dept: CARDIAC REHAB | Facility: HOSPITAL | Age: 71
End: 2021-09-09

## 2021-09-14 ENCOUNTER — APPOINTMENT (OUTPATIENT)
Dept: CARDIAC REHAB | Facility: HOSPITAL | Age: 71
End: 2021-09-14

## 2021-09-16 ENCOUNTER — APPOINTMENT (OUTPATIENT)
Dept: CARDIAC REHAB | Facility: HOSPITAL | Age: 71
End: 2021-09-16

## 2021-09-21 ENCOUNTER — APPOINTMENT (OUTPATIENT)
Dept: CARDIAC REHAB | Facility: HOSPITAL | Age: 71
End: 2021-09-21

## 2021-09-23 ENCOUNTER — APPOINTMENT (OUTPATIENT)
Dept: CARDIAC REHAB | Facility: HOSPITAL | Age: 71
End: 2021-09-23

## 2021-09-28 ENCOUNTER — APPOINTMENT (OUTPATIENT)
Dept: CARDIAC REHAB | Facility: HOSPITAL | Age: 71
End: 2021-09-28

## 2021-09-30 ENCOUNTER — APPOINTMENT (OUTPATIENT)
Dept: CARDIAC REHAB | Facility: HOSPITAL | Age: 71
End: 2021-09-30

## 2021-10-05 ENCOUNTER — APPOINTMENT (OUTPATIENT)
Dept: CARDIAC REHAB | Facility: HOSPITAL | Age: 71
End: 2021-10-05

## 2021-10-07 ENCOUNTER — APPOINTMENT (OUTPATIENT)
Dept: CARDIAC REHAB | Facility: HOSPITAL | Age: 71
End: 2021-10-07

## 2021-10-12 ENCOUNTER — APPOINTMENT (OUTPATIENT)
Dept: CARDIAC REHAB | Facility: HOSPITAL | Age: 71
End: 2021-10-12

## 2021-10-14 ENCOUNTER — APPOINTMENT (OUTPATIENT)
Dept: CARDIAC REHAB | Facility: HOSPITAL | Age: 71
End: 2021-10-14

## 2021-10-19 ENCOUNTER — APPOINTMENT (OUTPATIENT)
Dept: CARDIAC REHAB | Facility: HOSPITAL | Age: 71
End: 2021-10-19

## 2021-10-21 ENCOUNTER — APPOINTMENT (OUTPATIENT)
Dept: CARDIAC REHAB | Facility: HOSPITAL | Age: 71
End: 2021-10-21

## 2021-10-26 ENCOUNTER — APPOINTMENT (OUTPATIENT)
Dept: CARDIAC REHAB | Facility: HOSPITAL | Age: 71
End: 2021-10-26

## 2021-10-28 ENCOUNTER — APPOINTMENT (OUTPATIENT)
Dept: CARDIAC REHAB | Facility: HOSPITAL | Age: 71
End: 2021-10-28

## 2021-11-02 ENCOUNTER — APPOINTMENT (OUTPATIENT)
Dept: CARDIAC REHAB | Facility: HOSPITAL | Age: 71
End: 2021-11-02

## 2021-11-04 ENCOUNTER — APPOINTMENT (OUTPATIENT)
Dept: CARDIAC REHAB | Facility: HOSPITAL | Age: 71
End: 2021-11-04

## 2021-11-09 ENCOUNTER — APPOINTMENT (OUTPATIENT)
Dept: CARDIAC REHAB | Facility: HOSPITAL | Age: 71
End: 2021-11-09

## 2021-11-11 ENCOUNTER — APPOINTMENT (OUTPATIENT)
Dept: CARDIAC REHAB | Facility: HOSPITAL | Age: 71
End: 2021-11-11

## 2021-11-12 ENCOUNTER — TELEPHONE (OUTPATIENT)
Dept: UROLOGY | Facility: CLINIC | Age: 71
End: 2021-11-12

## 2021-11-12 DIAGNOSIS — R97.20 ELEVATED PROSTATE SPECIFIC ANTIGEN (PSA): Primary | ICD-10-CM

## 2021-11-12 NOTE — TELEPHONE ENCOUNTER
Caller: SANDRA    Relationship: [facility]    Best call back number: [phone]    What orders are you requesting (i.e. lab or imaging): MRI PROSTATE    In what timeframe would the patient need to come in: Monday 11/15    Where will you receive your lab/imaging services: [facility]    Additional notes: SANDRA CALLED IN STATING THEY HAVE NOT RECEIVED AN ORDER FOR PTS MRI OF PROSTATE FROM , PT IS COMING IN ON Monday 11/15 TO HAVE IT DONE.

## 2021-11-16 ENCOUNTER — APPOINTMENT (OUTPATIENT)
Dept: CARDIAC REHAB | Facility: HOSPITAL | Age: 71
End: 2021-11-16

## 2021-11-18 ENCOUNTER — APPOINTMENT (OUTPATIENT)
Dept: CARDIAC REHAB | Facility: HOSPITAL | Age: 71
End: 2021-11-18

## 2021-11-21 NOTE — PROGRESS NOTES
Chief Complaint    Urologic complaint    Subjective          Yann Pardo presents to [facility] UROLOGY  History of Present Illness       71 year old  gentleman here today for prostate cancer screening     Voiding ok.  No change. No GH.    No prostate meds    PVR        No urologic family history.  No family history of prostate cancer.    Mother  at 92 and father  at 86.    s/p cardiac stent.  Patient does not smoke. Baby aspirin daily.    PSA history    11/15/2021 MRI prostate 65 g-PI-RADS 4 lesion right posterior lateral peripheral zone.  Stable in size.  11 mm      6.58    10/28/2020 MRI fusion prostate biopsy - right base focal high-grade PIN, negative otherwise    10/20 prostate MRI  49 g - lesion in the right posterior mid to base peripheral zone PIRADS 4 - 11 mm .  More indeterminate lesion in the anterior mid left peripheral zone 16mm, PIRADS 3      9.51  3/20 prostate biopsy - 49 g -  neg     6.89       5.4, percent free 20% (23%)     6.09  11/10  1.8  10/09  1.6      1.7            Past History:  Medical History: has a past medical history of Allergic, Asthma, Bronchospasm, Colon polyp, Diverticulosis, Elevated PSA, GERD (gastroesophageal reflux disease), Heart disease, Hemorrhoids, Hyperglycemia, Hyperlipidemia, Hypertension, Lung collapse, Rib fracture, Sinus bradycardia, Sinusitis, and Tubular adenoma.   Surgical History: has a past surgical history that includes Colonoscopy; Coronary stent placement; Hernia repair; Portacath placement; and Prostate biopsy.   Family History: family history includes Kidney cancer in his father.   Social History: reports that he has quit smoking. His smoking use included cigarettes. He smoked 0.50 packs per day. He quit smokeless tobacco use about 36 years ago.  His smokeless tobacco use included chew. He reports current alcohol use of about 6.0 standard drinks of alcohol per week. He reports that  he does not use drugs.  Allergies: Patient has no known allergies.       Current Outpatient Medications:   •  aspirin (aspirin) 81 MG EC tablet, Aspir-81 oral tablet,delayed release (DR/EC) 81 mg take 1 tablet (81 mg) by oral route once daily   Suspended, Disp: , Rfl:   •  metoprolol succinate XL (TOPROL-XL) 25 MG 24 hr tablet, Take 12.5 mg by mouth Daily., Disp: , Rfl:   •  nitroglycerin (NITROLINGUAL) 0.4 MG/SPRAY spray, Place 1 spray under the tongue Every 5 (Five) Minutes As Needed for Chest Pain., Disp: 1 each, Rfl: 1  •  nitroglycerin (Nitrolingual) 0.4 MG/SPRAY spray, Place 1 spray under the tongue Every 5 (Five) Minutes As Needed for Chest Pain., Disp: 1 each, Rfl: 12  •  rosuvastatin (CRESTOR) 40 MG tablet, Take 40 mg by mouth., Disp: , Rfl:      Physical exam       Alert and orient x3  Well appearing, well developed, in no acute distress   Unlabored respirations  Nontender/nondistended      Grossly oriented to person, place and time, judgment is intact, normal mood and affect         Objective     Vital Signs:   There were no vitals taken for this visit.             Assessment and Plan    There are no diagnoses linked to this encounter.  Elevated PSA      Patient's MRI continues to show a PI-RADS 4 lesion that is stable.  We discussed risk and benefits of MRI fusion prostate biopsy versus conservative monitoring.  At this time we will continue to monitor conservatively I will have him follow-up in 4/22 with a PSA before that time his PSA is high we may consider going ahead with prostate biopsy.  His PSA considered to be low we may consider MRI again around November 2022.

## 2021-11-22 ENCOUNTER — OFFICE VISIT (OUTPATIENT)
Dept: UROLOGY | Facility: CLINIC | Age: 71
End: 2021-11-22

## 2021-11-22 VITALS — WEIGHT: 193.6 LBS | HEIGHT: 68 IN | BODY MASS INDEX: 29.34 KG/M2

## 2021-11-22 DIAGNOSIS — R97.20 ELEVATED PSA: Primary | ICD-10-CM

## 2021-11-22 PROCEDURE — 99212 OFFICE O/P EST SF 10 MIN: CPT | Performed by: UROLOGY

## 2021-11-22 RX ORDER — MONTELUKAST SODIUM 10 MG/1
10 TABLET ORAL DAILY
COMMUNITY
Start: 2021-08-17 | End: 2022-04-27

## 2021-11-23 ENCOUNTER — APPOINTMENT (OUTPATIENT)
Dept: CARDIAC REHAB | Facility: HOSPITAL | Age: 71
End: 2021-11-23

## 2021-11-25 ENCOUNTER — APPOINTMENT (OUTPATIENT)
Dept: CARDIAC REHAB | Facility: HOSPITAL | Age: 71
End: 2021-11-25

## 2021-11-29 ENCOUNTER — TELEPHONE (OUTPATIENT)
Dept: INTERNAL MEDICINE | Facility: CLINIC | Age: 71
End: 2021-11-29

## 2021-11-29 DIAGNOSIS — E03.8 SUBCLINICAL HYPOTHYROIDISM: ICD-10-CM

## 2021-11-29 DIAGNOSIS — E55.9 VITAMIN D DEFICIENCY: Primary | ICD-10-CM

## 2021-11-29 DIAGNOSIS — I25.10 CORONARY ARTERY DISEASE INVOLVING NATIVE CORONARY ARTERY, UNSPECIFIED WHETHER ANGINA PRESENT, UNSPECIFIED WHETHER NATIVE OR TRANSPLANTED HEART: ICD-10-CM

## 2021-11-29 DIAGNOSIS — R53.83 FATIGUE, UNSPECIFIED TYPE: ICD-10-CM

## 2021-11-29 DIAGNOSIS — E78.5 HYPERLIPIDEMIA, UNSPECIFIED HYPERLIPIDEMIA TYPE: ICD-10-CM

## 2021-11-29 DIAGNOSIS — R73.09 ABNORMAL BLOOD SUGAR: ICD-10-CM

## 2021-11-29 DIAGNOSIS — R73.9 ELEVATED BLOOD SUGAR: ICD-10-CM

## 2021-11-30 ENCOUNTER — APPOINTMENT (OUTPATIENT)
Dept: CARDIAC REHAB | Facility: HOSPITAL | Age: 71
End: 2021-11-30

## 2021-12-02 ENCOUNTER — APPOINTMENT (OUTPATIENT)
Dept: CARDIAC REHAB | Facility: HOSPITAL | Age: 71
End: 2021-12-02

## 2021-12-30 ENCOUNTER — LAB (OUTPATIENT)
Dept: LAB | Facility: HOSPITAL | Age: 71
End: 2021-12-30

## 2021-12-30 DIAGNOSIS — R73.09 ABNORMAL BLOOD SUGAR: ICD-10-CM

## 2021-12-30 DIAGNOSIS — R53.83 FATIGUE, UNSPECIFIED TYPE: ICD-10-CM

## 2021-12-30 DIAGNOSIS — R73.9 ELEVATED BLOOD SUGAR: ICD-10-CM

## 2021-12-30 DIAGNOSIS — E03.8 SUBCLINICAL HYPOTHYROIDISM: ICD-10-CM

## 2021-12-30 DIAGNOSIS — I25.10 CORONARY ARTERY DISEASE INVOLVING NATIVE CORONARY ARTERY, UNSPECIFIED WHETHER ANGINA PRESENT, UNSPECIFIED WHETHER NATIVE OR TRANSPLANTED HEART: ICD-10-CM

## 2021-12-30 DIAGNOSIS — E55.9 VITAMIN D DEFICIENCY: ICD-10-CM

## 2021-12-30 DIAGNOSIS — E78.5 HYPERLIPIDEMIA, UNSPECIFIED HYPERLIPIDEMIA TYPE: ICD-10-CM

## 2021-12-30 LAB
25(OH)D3 SERPL-MCNC: 24.5 NG/ML (ref 30–100)
ALBUMIN SERPL-MCNC: 4.7 G/DL (ref 3.5–5.2)
ALBUMIN/GLOB SERPL: 2.5 G/DL
ALP SERPL-CCNC: 65 U/L (ref 39–117)
ALT SERPL W P-5'-P-CCNC: 47 U/L (ref 1–41)
ANION GAP SERPL CALCULATED.3IONS-SCNC: 6.8 MMOL/L (ref 5–15)
AST SERPL-CCNC: 40 U/L (ref 1–40)
BASOPHILS # BLD AUTO: 0.06 10*3/MM3 (ref 0–0.2)
BASOPHILS NFR BLD AUTO: 1.3 % (ref 0–1.5)
BILIRUB SERPL-MCNC: 0.3 MG/DL (ref 0–1.2)
BUN SERPL-MCNC: 17 MG/DL (ref 8–23)
BUN/CREAT SERPL: 13.2 (ref 7–25)
CALCIUM SPEC-SCNC: 9.2 MG/DL (ref 8.6–10.5)
CHLORIDE SERPL-SCNC: 108 MMOL/L (ref 98–107)
CHOLEST SERPL-MCNC: 173 MG/DL (ref 0–200)
CO2 SERPL-SCNC: 27.2 MMOL/L (ref 22–29)
CREAT SERPL-MCNC: 1.29 MG/DL (ref 0.76–1.27)
DEPRECATED RDW RBC AUTO: 39.7 FL (ref 37–54)
EOSINOPHIL # BLD AUTO: 0.09 10*3/MM3 (ref 0–0.4)
EOSINOPHIL NFR BLD AUTO: 1.9 % (ref 0.3–6.2)
ERYTHROCYTE [DISTWIDTH] IN BLOOD BY AUTOMATED COUNT: 12.1 % (ref 12.3–15.4)
GFR SERPL CREATININE-BSD FRML MDRD: 55 ML/MIN/1.73
GLOBULIN UR ELPH-MCNC: 1.9 GM/DL
GLUCOSE SERPL-MCNC: 107 MG/DL (ref 65–99)
HBA1C MFR BLD: 6.06 % (ref 4.8–5.6)
HCT VFR BLD AUTO: 45.7 % (ref 37.5–51)
HDLC SERPL-MCNC: 47 MG/DL (ref 40–60)
HGB BLD-MCNC: 15.4 G/DL (ref 13–17.7)
IMM GRANULOCYTES # BLD AUTO: 0.01 10*3/MM3 (ref 0–0.05)
IMM GRANULOCYTES NFR BLD AUTO: 0.2 % (ref 0–0.5)
LDLC SERPL CALC-MCNC: 101 MG/DL (ref 0–100)
LDLC/HDLC SERPL: 2.07 {RATIO}
LYMPHOCYTES # BLD AUTO: 1.68 10*3/MM3 (ref 0.7–3.1)
LYMPHOCYTES NFR BLD AUTO: 35.9 % (ref 19.6–45.3)
MCH RBC QN AUTO: 30.3 PG (ref 26.6–33)
MCHC RBC AUTO-ENTMCNC: 33.7 G/DL (ref 31.5–35.7)
MCV RBC AUTO: 90 FL (ref 79–97)
MONOCYTES # BLD AUTO: 0.52 10*3/MM3 (ref 0.1–0.9)
MONOCYTES NFR BLD AUTO: 11.1 % (ref 5–12)
NEUTROPHILS NFR BLD AUTO: 2.32 10*3/MM3 (ref 1.7–7)
NEUTROPHILS NFR BLD AUTO: 49.6 % (ref 42.7–76)
NRBC BLD AUTO-RTO: 0 /100 WBC (ref 0–0.2)
PLATELET # BLD AUTO: 176 10*3/MM3 (ref 140–450)
PMV BLD AUTO: 10.1 FL (ref 6–12)
POTASSIUM SERPL-SCNC: 4.7 MMOL/L (ref 3.5–5.2)
PROT SERPL-MCNC: 6.6 G/DL (ref 6–8.5)
RBC # BLD AUTO: 5.08 10*6/MM3 (ref 4.14–5.8)
SODIUM SERPL-SCNC: 142 MMOL/L (ref 136–145)
T4 FREE SERPL-MCNC: 1.11 NG/DL (ref 0.93–1.7)
TRIGL SERPL-MCNC: 144 MG/DL (ref 0–150)
TSH SERPL DL<=0.05 MIU/L-ACNC: 6.16 UIU/ML (ref 0.27–4.2)
VLDLC SERPL-MCNC: 25 MG/DL (ref 5–40)
WBC NRBC COR # BLD: 4.68 10*3/MM3 (ref 3.4–10.8)

## 2021-12-30 PROCEDURE — 80061 LIPID PANEL: CPT

## 2021-12-30 PROCEDURE — 85025 COMPLETE CBC W/AUTO DIFF WBC: CPT

## 2021-12-30 PROCEDURE — 84439 ASSAY OF FREE THYROXINE: CPT

## 2021-12-30 PROCEDURE — 80053 COMPREHEN METABOLIC PANEL: CPT

## 2021-12-30 PROCEDURE — 84443 ASSAY THYROID STIM HORMONE: CPT

## 2021-12-30 PROCEDURE — 36415 COLL VENOUS BLD VENIPUNCTURE: CPT

## 2021-12-30 PROCEDURE — 82306 VITAMIN D 25 HYDROXY: CPT

## 2021-12-30 PROCEDURE — 83036 HEMOGLOBIN GLYCOSYLATED A1C: CPT

## 2022-01-26 ENCOUNTER — OFFICE VISIT (OUTPATIENT)
Dept: INTERNAL MEDICINE | Facility: CLINIC | Age: 72
End: 2022-01-26

## 2022-01-26 VITALS
SYSTOLIC BLOOD PRESSURE: 146 MMHG | OXYGEN SATURATION: 99 % | RESPIRATION RATE: 16 BRPM | TEMPERATURE: 97.8 F | BODY MASS INDEX: 28.95 KG/M2 | HEART RATE: 57 BPM | HEIGHT: 68 IN | DIASTOLIC BLOOD PRESSURE: 90 MMHG | WEIGHT: 191 LBS

## 2022-01-26 DIAGNOSIS — Z12.5 PROSTATE CANCER SCREENING: ICD-10-CM

## 2022-01-26 DIAGNOSIS — Z13.9 SCREENING DUE: Primary | ICD-10-CM

## 2022-01-26 DIAGNOSIS — R97.20 ELEVATED PSA: ICD-10-CM

## 2022-01-26 DIAGNOSIS — E55.9 VITAMIN D DEFICIENCY: ICD-10-CM

## 2022-01-26 DIAGNOSIS — I10 PRIMARY HYPERTENSION: ICD-10-CM

## 2022-01-26 DIAGNOSIS — E03.9 HYPOTHYROIDISM, UNSPECIFIED TYPE: ICD-10-CM

## 2022-01-26 DIAGNOSIS — I25.10 CORONARY ARTERY DISEASE INVOLVING NATIVE CORONARY ARTERY OF NATIVE HEART WITHOUT ANGINA PECTORIS: ICD-10-CM

## 2022-01-26 DIAGNOSIS — E03.8 SUBCLINICAL HYPOTHYROIDISM: ICD-10-CM

## 2022-01-26 DIAGNOSIS — E11.9 TYPE 2 DIABETES MELLITUS WITHOUT COMPLICATION, UNSPECIFIED WHETHER LONG TERM INSULIN USE: ICD-10-CM

## 2022-01-26 DIAGNOSIS — Z79.899 ENCOUNTER FOR LONG-TERM (CURRENT) USE OF OTHER MEDICATIONS: ICD-10-CM

## 2022-01-26 DIAGNOSIS — R97.20 ABNORMAL PSA: ICD-10-CM

## 2022-01-26 DIAGNOSIS — E78.5 HYPERLIPIDEMIA, UNSPECIFIED HYPERLIPIDEMIA TYPE: ICD-10-CM

## 2022-01-26 DIAGNOSIS — E53.8 B12 DEFICIENCY: ICD-10-CM

## 2022-01-26 PROBLEM — I51.9 HEART DISEASE: Status: RESOLVED | Noted: 2021-07-19 | Resolved: 2022-01-26

## 2022-01-26 PROCEDURE — 99214 OFFICE O/P EST MOD 30 MIN: CPT | Performed by: INTERNAL MEDICINE

## 2022-01-26 RX ORDER — LEVOTHYROXINE SODIUM 0.03 MG/1
25 TABLET ORAL DAILY
Qty: 30 TABLET | Refills: 3 | Status: SHIPPED | OUTPATIENT
Start: 2022-01-26 | End: 2022-04-20 | Stop reason: SDUPTHER

## 2022-01-26 RX ORDER — MONTELUKAST SODIUM 10 MG/1
10 TABLET ORAL NIGHTLY
Qty: 90 TABLET | Refills: 3 | Status: SHIPPED | OUTPATIENT
Start: 2022-01-26 | End: 2023-02-06

## 2022-01-26 NOTE — ASSESSMENT & PLAN NOTE
Patient said no chest pain or shortness of breath.  Has not required nitroglycerin.  [place]  Had [place].  He did bag his limit 1 elk.  Cardiac rehab has been closed for about a year due to Covid pandemic and he is really missed that.  Prior to that he had gone for 15 years.  Assessment CAD with stent drug-eluting.  2005.  Stable.  Plan: Patient follows with cardiologist every 6 to 12 months in [place].  Continue Toprol-XL 25 mg daily, rosuvastatin 40 mg daily.  Try to increase his exercise including treadmill at home.

## 2022-01-26 NOTE — ASSESSMENT & PLAN NOTE
Patient has had slightly elevated TSH for over a year and we have been observing.  We have discussed adding a low dose.  He does complain of fatigue and I think he would benefit from low-dose Synthroid.  He also agrees and wants to start Synthroid  Assessment: Mild hypothyroidism that has been subclinical for slightly over a year.  Developing some symptoms I think you would benefit from addition of Synthroid.  Plan: I discussed with him that he might have to have an increase in his Synthroid at least over the next 6 to 12 months when we started.  We will start Synthroid 0.025 mg daily.  Repeat free T4 TSH in 6 weeks.  RTO in 3 months with lab.  That will include another TFT

## 2022-01-26 NOTE — PROGRESS NOTES
"Chief Complaint  Labs Only (Routine office visit with labs. Pt states overall wellness. No concerns at this time. )    Subjective  He has been feeling well.  Not as active as usual.  Not going to cardiac rehab because been closed for a year due to the pandemic.    Yann Pardo presents to [facility] INTER[facility] MEDICINE  History of Present Illness  No chest pains.  No shortness of breath.  Overall feels well.  Objective   Vital Signs:   /90 (BP Location: Right arm, Patient Position: Sitting, Cuff Size: Adult)   Pulse 57   Temp 97.8 °F (36.6 °C) (Temporal)   Resp 16   Ht 172.7 cm (68\")   Wt 86.6 kg (191 lb)   SpO2 99%   BMI 29.04 kg/m²     Physical Exam  Vitals and nursing note reviewed.   Constitutional:       Appearance: Normal appearance.   HENT:      Head: Normocephalic.   Eyes:      Extraocular Movements: Extraocular movements intact.      Conjunctiva/sclera: Conjunctivae normal.   Cardiovascular:      Rate and Rhythm: Normal rate and regular rhythm.      Heart sounds: Normal heart sounds. No murmur heard.      Pulmonary:      Effort: Pulmonary effort is normal.      Breath sounds: Normal breath sounds. No wheezing or rales.   Abdominal:      General: Bowel sounds are normal.      Palpations: Abdomen is soft.      Tenderness: There is no abdominal tenderness. There is no guarding.   Musculoskeletal:         General: No swelling. Normal range of motion.   Skin:     General: Skin is warm and dry.   Neurological:      General: No focal deficit present.      Mental Status: He is alert. Mental status is at baseline.   Psychiatric:         Mood and Affect: Mood normal.         Thought Content: Thought content normal.        Result Review :  The following data was reviewed by: Katalina Abbott MD on 01/26/2022:  CMP    CMP 3/4/21 7/15/21 12/30/21   Glucose 99 102 (A) 107 (A)   BUN 19 19 17   Creatinine 1.32 (A) 1.14 1.29 (A)   eGFR Non African Am  63 55 (A)   Sodium 138 143 142   Potassium " 4.4 4.4 4.7   Chloride 104 109 (A) 108 (A)   Calcium 9.0 9.1 9.2   Albumin 4.0 4.20 4.70   Total Bilirubin 0.34 0.2 0.3   Alkaline Phosphatase 65 61 65   AST (SGOT) 39 28 40   ALT (SGPT) 53 (A) 37 47 (A)   (A) Abnormal value            CBC w/diff    CBC w/Diff 3/4/21 7/15/21 12/30/21   WBC 5.36 4.98 4.68   RBC 4.93 4.85 5.08   Hemoglobin 14.8 14.8 15.4   Hematocrit 45.4 43.7 45.7   MCV 92.1 90.1 90.0   MCH 30.0 30.5 30.3   MCHC 32.6 (A) 33.9 33.7   RDW 12.3 12.5 12.1 (A)   Platelets 190 172 176   Neutrophil Rel % 50.0 52.3 49.6   Immature Granulocyte Rel %  0.4 0.2   Lymphocyte Rel % 36.4 33.1 35.9   Monocyte Rel % 9.3 10.6 11.1   Eosinophil Rel % 3.0 2.2 1.9   Basophil Rel % 1.1 1.4 1.3   (A) Abnormal value            Lipid Panel    Lipid Panel 3/4/21 7/15/21 12/30/21   Total Cholesterol  146 173   Total Cholesterol 160     Triglycerides 127 237 (A) 144   HDL Cholesterol 40 36 (A) 47   VLDL Cholesterol 25 39 25   LDL Cholesterol  95 71 101 (A)   LDL/HDL Ratio  1.74 2.07   (A) Abnormal value       Comments are available for some flowsheets but are not being displayed.           TSH    TSH 3/4/21 7/15/21 12/30/21   TSH 7.250 (A) 6.190 (A) 6.160 (A)   (A) Abnormal value            Most Recent A1C    HGBA1C Most Recent 12/30/21   Hemoglobin A1C 6.06 (A)   (A) Abnormal value                A1C Last 3 Results    HGBA1C Last 3 Results 3/4/21 7/15/21 12/30/21   Hemoglobin A1C 5.8 (A) 5.86 (A) 6.06 (A)   (A) Abnormal value       Comments are available for some flowsheets but are not being displayed.               PSA    PSA 4/9/21   PSA 6.58 (A)   (A) Abnormal value                          Assessment and Plan   Diagnoses and all orders for this visit:    1. Screening due (Primary)  -     Hepatitis C antibody; Future    2. B12 deficiency  -     Vitamin B12; Future  -     Folate; Future    3. Encounter for long-term (current) use of other medications  -     CBC w AUTO Differential; Future    4. Primary hypertension  -      Comprehensive metabolic panel; Future  -     Urinalysis With Microscopic - Urine, Clean Catch; Future    5. Hyperlipidemia, unspecified hyperlipidemia type  Assessment & Plan:  LDL cholesterol slightly over 100.  Last visit it was 71 in July 2021.  Labs discussed with him.  This was done right after [place].  Assessment: Hyperlipidemia.  Needs better control.  Plan: Lose weight.  He was down to 168 pounds at 1 point.  Now he is 191 pounds.  Adjust his diet especially the snack foods in the evening.    Orders:  -     Lipid panel; Future    6. Type 2 diabetes mellitus without complication, unspecified whether long term insulin use (HCC)  -     Hemoglobin A1c; Future    7. Hypothyroidism, unspecified type  -     TSH+Free T4; Future  -     TSH+Free T4; Future    8. Vitamin D deficiency  -     Vitamin D 25 hydroxy; Future    9. Prostate cancer screening    10. Abnormal PSA  -     PSA DIAGNOSTIC; Future    11. Coronary artery disease involving native coronary artery of native heart without angina pectoris  Assessment & Plan:  Patient said no chest pain or shortness of breath.  Has not required nitroglycerin.  [place]  Had no problems.  He did bag his limit 1 elk.  Cardiac rehab has been closed for about a year due to Covid pandemic and he is really missed that.  Prior to that he had gone for 15 years.  Assessment CAD with stent drug-eluting.  2005.  Stable.  Plan: Patient follows with cardiologist every 6 to 12 months in [place].  Continue Toprol-XL 25 mg daily, rosuvastatin 40 mg daily.  Try to increase his exercise including treadmill at home.      12. Subclinical hypothyroidism  Assessment & Plan:  Patient has had slightly elevated TSH for over a year and we have been observing.  We have discussed adding a low dose.  He does complain of fatigue and I think he would benefit from low-dose Synthroid.  He also agrees and wants to start Synthroid  Assessment: Mild hypothyroidism  that has been subclinical for slightly over a year.  Developing some symptoms I think you would benefit from addition of Synthroid.  Plan: I discussed with him that he might have to have an increase in his Synthroid at least over the next 6 to 12 months when we started.  We will start Synthroid 0.025 mg daily.  Repeat free T4 TSH in 6 weeks.  RTO in 3 months with lab.  That will include another TFT      13. Elevated PSA  Assessment & Plan:  Patient follows with Dr. Rubio for elevated PSA.  He saw him in December.  Reschedule him for December 2022.  Abnormal PSA.  Has had full evaluation.  Plan: I will check another PSA next visit.      Other orders  -     montelukast (Singulair) 10 MG tablet; Take 1 tablet by mouth Every Night.  Dispense: 90 tablet; Refill: 3  -     levothyroxine (Synthroid) 25 MCG tablet; Take 1 tablet by mouth Daily.  Dispense: 30 tablet; Refill: 3        Follow Up Return in about 3 months (around 4/26/2022).  Patient was given instructions and counseling regarding his condition or for health maintenance advice. Please see specific information pulled into the AVS if appropriate.

## 2022-01-26 NOTE — ASSESSMENT & PLAN NOTE
LDL cholesterol slightly over 100.  Last visit it was 71 in July 2021.  Labs discussed with him.  This was done right after [place].  Assessment: Hyperlipidemia.  Needs better control.  Plan: Lose weight.  He was down to 168 pounds at 1 point.  Now he is 191 pounds.  Adjust his diet especially the snack foods in the evening.

## 2022-01-26 NOTE — ASSESSMENT & PLAN NOTE
Patient follows with Dr. Rubio for elevated PSA.  He saw him in December.  Reschedule him for December 2022.  Abnormal PSA.  Has had full evaluation.  Plan: I will check another PSA next visit.

## 2022-02-22 ENCOUNTER — OFFICE VISIT (OUTPATIENT)
Dept: CARDIAC REHAB | Facility: HOSPITAL | Age: 72
End: 2022-02-22

## 2022-02-22 DIAGNOSIS — T82.855D STENOSIS OF CORONARY ARTERY STENT, SUBSEQUENT ENCOUNTER: Primary | ICD-10-CM

## 2022-02-22 NOTE — PROGRESS NOTES
Tolerated Session Well.  See session report.  Blood pressure 140/84, 96% spo2 and hr 71..30 min on treadmill

## 2022-02-24 ENCOUNTER — OFFICE VISIT (OUTPATIENT)
Dept: CARDIAC REHAB | Facility: HOSPITAL | Age: 72
End: 2022-02-24

## 2022-02-24 VITALS
WEIGHT: 186.29 LBS | DIASTOLIC BLOOD PRESSURE: 80 MMHG | BODY MASS INDEX: 28.33 KG/M2 | OXYGEN SATURATION: 97 % | SYSTOLIC BLOOD PRESSURE: 140 MMHG | HEART RATE: 79 BPM

## 2022-02-24 DIAGNOSIS — T82.855D STENOSIS OF CORONARY ARTERY STENT, SUBSEQUENT ENCOUNTER: Primary | ICD-10-CM

## 2022-03-01 ENCOUNTER — OFFICE VISIT (OUTPATIENT)
Dept: CARDIAC REHAB | Facility: HOSPITAL | Age: 72
End: 2022-03-01

## 2022-03-01 VITALS
WEIGHT: 186.73 LBS | DIASTOLIC BLOOD PRESSURE: 80 MMHG | BODY MASS INDEX: 28.39 KG/M2 | SYSTOLIC BLOOD PRESSURE: 110 MMHG | HEART RATE: 68 BPM | OXYGEN SATURATION: 96 %

## 2022-03-01 DIAGNOSIS — T82.855D STENOSIS OF CORONARY ARTERY STENT, SUBSEQUENT ENCOUNTER: Primary | ICD-10-CM

## 2022-03-03 ENCOUNTER — OFFICE VISIT (OUTPATIENT)
Dept: CARDIAC REHAB | Facility: HOSPITAL | Age: 72
End: 2022-03-03

## 2022-03-03 VITALS
BODY MASS INDEX: 28.56 KG/M2 | OXYGEN SATURATION: 95 % | WEIGHT: 187.83 LBS | SYSTOLIC BLOOD PRESSURE: 124 MMHG | DIASTOLIC BLOOD PRESSURE: 98 MMHG | HEART RATE: 62 BPM

## 2022-03-03 DIAGNOSIS — T82.855D STENOSIS OF CORONARY ARTERY STENT, SUBSEQUENT ENCOUNTER: Primary | ICD-10-CM

## 2022-03-08 ENCOUNTER — OFFICE VISIT (OUTPATIENT)
Dept: CARDIAC REHAB | Facility: HOSPITAL | Age: 72
End: 2022-03-08

## 2022-03-08 DIAGNOSIS — T82.855D STENOSIS OF CORONARY ARTERY STENT, SUBSEQUENT ENCOUNTER: Primary | ICD-10-CM

## 2022-03-10 ENCOUNTER — OFFICE VISIT (OUTPATIENT)
Dept: CARDIAC REHAB | Facility: HOSPITAL | Age: 72
End: 2022-03-10

## 2022-03-10 VITALS
BODY MASS INDEX: 28.56 KG/M2 | OXYGEN SATURATION: 98 % | SYSTOLIC BLOOD PRESSURE: 118 MMHG | DIASTOLIC BLOOD PRESSURE: 80 MMHG | WEIGHT: 187.83 LBS | HEART RATE: 60 BPM

## 2022-03-10 DIAGNOSIS — T82.855D STENOSIS OF CORONARY ARTERY STENT, SUBSEQUENT ENCOUNTER: Primary | ICD-10-CM

## 2022-03-15 ENCOUNTER — OFFICE VISIT (OUTPATIENT)
Dept: CARDIAC REHAB | Facility: HOSPITAL | Age: 72
End: 2022-03-15

## 2022-03-15 VITALS
DIASTOLIC BLOOD PRESSURE: 78 MMHG | WEIGHT: 187.61 LBS | SYSTOLIC BLOOD PRESSURE: 138 MMHG | HEART RATE: 60 BPM | BODY MASS INDEX: 28.53 KG/M2 | OXYGEN SATURATION: 98 %

## 2022-03-15 DIAGNOSIS — T82.855D STENOSIS OF CORONARY ARTERY STENT, SUBSEQUENT ENCOUNTER: Primary | ICD-10-CM

## 2022-03-17 ENCOUNTER — OFFICE VISIT (OUTPATIENT)
Dept: CARDIAC REHAB | Facility: HOSPITAL | Age: 72
End: 2022-03-17

## 2022-03-17 VITALS
SYSTOLIC BLOOD PRESSURE: 120 MMHG | HEART RATE: 57 BPM | OXYGEN SATURATION: 96 % | WEIGHT: 186.95 LBS | DIASTOLIC BLOOD PRESSURE: 80 MMHG | BODY MASS INDEX: 28.43 KG/M2

## 2022-03-17 DIAGNOSIS — T82.855D STENOSIS OF CORONARY ARTERY STENT, SUBSEQUENT ENCOUNTER: Primary | ICD-10-CM

## 2022-03-22 ENCOUNTER — OFFICE VISIT (OUTPATIENT)
Dept: CARDIAC REHAB | Facility: HOSPITAL | Age: 72
End: 2022-03-22

## 2022-03-22 VITALS
SYSTOLIC BLOOD PRESSURE: 132 MMHG | BODY MASS INDEX: 28.69 KG/M2 | DIASTOLIC BLOOD PRESSURE: 82 MMHG | WEIGHT: 188.71 LBS | HEART RATE: 75 BPM | OXYGEN SATURATION: 97 %

## 2022-03-22 DIAGNOSIS — T82.855D STENOSIS OF CORONARY ARTERY STENT, SUBSEQUENT ENCOUNTER: Primary | ICD-10-CM

## 2022-03-24 ENCOUNTER — OFFICE VISIT (OUTPATIENT)
Dept: CARDIAC REHAB | Facility: HOSPITAL | Age: 72
End: 2022-03-24

## 2022-03-24 VITALS
SYSTOLIC BLOOD PRESSURE: 130 MMHG | WEIGHT: 187.61 LBS | DIASTOLIC BLOOD PRESSURE: 70 MMHG | HEART RATE: 59 BPM | BODY MASS INDEX: 28.53 KG/M2 | OXYGEN SATURATION: 95 %

## 2022-03-24 DIAGNOSIS — T82.855D STENOSIS OF CORONARY ARTERY STENT, SUBSEQUENT ENCOUNTER: Primary | ICD-10-CM

## 2022-03-29 ENCOUNTER — APPOINTMENT (OUTPATIENT)
Dept: CARDIAC REHAB | Facility: HOSPITAL | Age: 72
End: 2022-03-29

## 2022-03-31 ENCOUNTER — APPOINTMENT (OUTPATIENT)
Dept: CARDIAC REHAB | Facility: HOSPITAL | Age: 72
End: 2022-03-31

## 2022-04-01 ENCOUNTER — LAB (OUTPATIENT)
Dept: LAB | Facility: HOSPITAL | Age: 72
End: 2022-04-01

## 2022-04-01 DIAGNOSIS — E03.9 HYPOTHYROIDISM, UNSPECIFIED TYPE: ICD-10-CM

## 2022-04-01 DIAGNOSIS — R97.20 ABNORMAL PSA: ICD-10-CM

## 2022-04-01 DIAGNOSIS — E53.8 B12 DEFICIENCY: ICD-10-CM

## 2022-04-01 DIAGNOSIS — E11.9 TYPE 2 DIABETES MELLITUS WITHOUT COMPLICATION, UNSPECIFIED WHETHER LONG TERM INSULIN USE: ICD-10-CM

## 2022-04-01 DIAGNOSIS — I10 PRIMARY HYPERTENSION: ICD-10-CM

## 2022-04-01 DIAGNOSIS — E55.9 VITAMIN D DEFICIENCY: ICD-10-CM

## 2022-04-01 DIAGNOSIS — R97.20 ELEVATED PSA: ICD-10-CM

## 2022-04-01 DIAGNOSIS — Z79.899 ENCOUNTER FOR LONG-TERM (CURRENT) USE OF OTHER MEDICATIONS: ICD-10-CM

## 2022-04-01 DIAGNOSIS — Z13.9 SCREENING DUE: ICD-10-CM

## 2022-04-01 DIAGNOSIS — E78.5 HYPERLIPIDEMIA, UNSPECIFIED HYPERLIPIDEMIA TYPE: ICD-10-CM

## 2022-04-01 LAB
25(OH)D3 SERPL-MCNC: 17.8 NG/ML (ref 30–100)
ALBUMIN SERPL-MCNC: 4.5 G/DL (ref 3.5–5.2)
ALBUMIN/GLOB SERPL: 2.3 G/DL
ALP SERPL-CCNC: 60 U/L (ref 39–117)
ALT SERPL W P-5'-P-CCNC: 44 U/L (ref 1–41)
ANION GAP SERPL CALCULATED.3IONS-SCNC: 7.6 MMOL/L (ref 5–15)
AST SERPL-CCNC: 25 U/L (ref 1–40)
BACTERIA UR QL AUTO: NORMAL /HPF
BASOPHILS # BLD AUTO: 0.06 10*3/MM3 (ref 0–0.2)
BASOPHILS NFR BLD AUTO: 1.2 % (ref 0–1.5)
BILIRUB SERPL-MCNC: 0.4 MG/DL (ref 0–1.2)
BILIRUB UR QL STRIP: NEGATIVE
BUN SERPL-MCNC: 16 MG/DL (ref 8–23)
BUN/CREAT SERPL: 12.2 (ref 7–25)
CALCIUM SPEC-SCNC: 9.3 MG/DL (ref 8.6–10.5)
CHLORIDE SERPL-SCNC: 105 MMOL/L (ref 98–107)
CHOLEST SERPL-MCNC: 138 MG/DL (ref 0–200)
CLARITY UR: ABNORMAL
CO2 SERPL-SCNC: 28.4 MMOL/L (ref 22–29)
COLOR UR: YELLOW
CREAT SERPL-MCNC: 1.31 MG/DL (ref 0.76–1.27)
DEPRECATED RDW RBC AUTO: 40.1 FL (ref 37–54)
EGFRCR SERPLBLD CKD-EPI 2021: 57.8 ML/MIN/1.73
EOSINOPHIL # BLD AUTO: 0.11 10*3/MM3 (ref 0–0.4)
EOSINOPHIL NFR BLD AUTO: 2.2 % (ref 0.3–6.2)
ERYTHROCYTE [DISTWIDTH] IN BLOOD BY AUTOMATED COUNT: 12.2 % (ref 12.3–15.4)
FOLATE SERPL-MCNC: 6.94 NG/ML (ref 4.78–24.2)
GLOBULIN UR ELPH-MCNC: 2 GM/DL
GLUCOSE SERPL-MCNC: 105 MG/DL (ref 65–99)
GLUCOSE UR STRIP-MCNC: NEGATIVE MG/DL
HBA1C MFR BLD: 5.9 % (ref 4.8–5.6)
HCT VFR BLD AUTO: 45.3 % (ref 37.5–51)
HCV AB SER DONR QL: NORMAL
HDLC SERPL-MCNC: 41 MG/DL (ref 40–60)
HGB BLD-MCNC: 15.2 G/DL (ref 13–17.7)
HGB UR QL STRIP.AUTO: NEGATIVE
HYALINE CASTS UR QL AUTO: NORMAL /LPF
IMM GRANULOCYTES # BLD AUTO: 0.01 10*3/MM3 (ref 0–0.05)
IMM GRANULOCYTES NFR BLD AUTO: 0.2 % (ref 0–0.5)
KETONES UR QL STRIP: NEGATIVE
LDLC SERPL CALC-MCNC: 75 MG/DL (ref 0–100)
LDLC/HDLC SERPL: 1.77 {RATIO}
LEUKOCYTE ESTERASE UR QL STRIP.AUTO: NEGATIVE
LYMPHOCYTES # BLD AUTO: 1.56 10*3/MM3 (ref 0.7–3.1)
LYMPHOCYTES NFR BLD AUTO: 31.4 % (ref 19.6–45.3)
MCH RBC QN AUTO: 30.6 PG (ref 26.6–33)
MCHC RBC AUTO-ENTMCNC: 33.6 G/DL (ref 31.5–35.7)
MCV RBC AUTO: 91.1 FL (ref 79–97)
MONOCYTES # BLD AUTO: 0.51 10*3/MM3 (ref 0.1–0.9)
MONOCYTES NFR BLD AUTO: 10.3 % (ref 5–12)
NEUTROPHILS NFR BLD AUTO: 2.72 10*3/MM3 (ref 1.7–7)
NEUTROPHILS NFR BLD AUTO: 54.7 % (ref 42.7–76)
NITRITE UR QL STRIP: NEGATIVE
NRBC BLD AUTO-RTO: 0 /100 WBC (ref 0–0.2)
PH UR STRIP.AUTO: 5.5 [PH] (ref 5–8)
PLATELET # BLD AUTO: 166 10*3/MM3 (ref 140–450)
PMV BLD AUTO: 10.1 FL (ref 6–12)
POTASSIUM SERPL-SCNC: 4.4 MMOL/L (ref 3.5–5.2)
PROT SERPL-MCNC: 6.5 G/DL (ref 6–8.5)
PROT UR QL STRIP: NEGATIVE
PSA SERPL-MCNC: 7.24 NG/ML (ref 0–4)
RBC # BLD AUTO: 4.97 10*6/MM3 (ref 4.14–5.8)
RBC # UR STRIP: NORMAL /HPF
REF LAB TEST METHOD: NORMAL
SODIUM SERPL-SCNC: 141 MMOL/L (ref 136–145)
SP GR UR STRIP: 1.03 (ref 1–1.03)
SQUAMOUS #/AREA URNS HPF: NORMAL /HPF
T4 FREE SERPL-MCNC: 1.23 NG/DL (ref 0.93–1.7)
TRIGL SERPL-MCNC: 122 MG/DL (ref 0–150)
TSH SERPL DL<=0.05 MIU/L-ACNC: 3.6 UIU/ML (ref 0.27–4.2)
UROBILINOGEN UR QL STRIP: ABNORMAL
VIT B12 BLD-MCNC: 437 PG/ML (ref 211–946)
VLDLC SERPL-MCNC: 22 MG/DL (ref 5–40)
WBC # UR STRIP: NORMAL /HPF
WBC NRBC COR # BLD: 4.97 10*3/MM3 (ref 3.4–10.8)

## 2022-04-01 PROCEDURE — 80050 GENERAL HEALTH PANEL: CPT

## 2022-04-01 PROCEDURE — 84439 ASSAY OF FREE THYROXINE: CPT

## 2022-04-01 PROCEDURE — 84153 ASSAY OF PSA TOTAL: CPT

## 2022-04-01 PROCEDURE — 86803 HEPATITIS C AB TEST: CPT

## 2022-04-01 PROCEDURE — 36415 COLL VENOUS BLD VENIPUNCTURE: CPT

## 2022-04-01 PROCEDURE — 83036 HEMOGLOBIN GLYCOSYLATED A1C: CPT

## 2022-04-01 PROCEDURE — 81001 URINALYSIS AUTO W/SCOPE: CPT

## 2022-04-01 PROCEDURE — 82746 ASSAY OF FOLIC ACID SERUM: CPT

## 2022-04-01 PROCEDURE — 82607 VITAMIN B-12: CPT

## 2022-04-01 PROCEDURE — 80061 LIPID PANEL: CPT

## 2022-04-01 PROCEDURE — 82306 VITAMIN D 25 HYDROXY: CPT

## 2022-04-05 ENCOUNTER — OFFICE VISIT (OUTPATIENT)
Dept: CARDIAC REHAB | Facility: HOSPITAL | Age: 72
End: 2022-04-05

## 2022-04-05 VITALS
SYSTOLIC BLOOD PRESSURE: 138 MMHG | HEART RATE: 63 BPM | WEIGHT: 188.05 LBS | BODY MASS INDEX: 28.59 KG/M2 | DIASTOLIC BLOOD PRESSURE: 82 MMHG | OXYGEN SATURATION: 95 %

## 2022-04-05 DIAGNOSIS — T82.855D STENOSIS OF CORONARY ARTERY STENT, SUBSEQUENT ENCOUNTER: Primary | ICD-10-CM

## 2022-04-07 ENCOUNTER — OFFICE VISIT (OUTPATIENT)
Dept: CARDIAC REHAB | Facility: HOSPITAL | Age: 72
End: 2022-04-07

## 2022-04-07 VITALS
HEART RATE: 65 BPM | DIASTOLIC BLOOD PRESSURE: 80 MMHG | OXYGEN SATURATION: 98 % | SYSTOLIC BLOOD PRESSURE: 136 MMHG | BODY MASS INDEX: 28.49 KG/M2 | WEIGHT: 187.39 LBS

## 2022-04-07 DIAGNOSIS — T82.855D STENOSIS OF CORONARY ARTERY STENT, SUBSEQUENT ENCOUNTER: Primary | ICD-10-CM

## 2022-04-12 ENCOUNTER — OFFICE VISIT (OUTPATIENT)
Dept: CARDIAC REHAB | Facility: HOSPITAL | Age: 72
End: 2022-04-12

## 2022-04-12 VITALS
WEIGHT: 188.49 LBS | BODY MASS INDEX: 28.66 KG/M2 | DIASTOLIC BLOOD PRESSURE: 80 MMHG | SYSTOLIC BLOOD PRESSURE: 150 MMHG | OXYGEN SATURATION: 95 % | HEART RATE: 66 BPM

## 2022-04-12 DIAGNOSIS — T82.855D STENOSIS OF CORONARY ARTERY STENT, SUBSEQUENT ENCOUNTER: Primary | ICD-10-CM

## 2022-04-14 ENCOUNTER — OFFICE VISIT (OUTPATIENT)
Dept: CARDIAC REHAB | Facility: HOSPITAL | Age: 72
End: 2022-04-14

## 2022-04-14 VITALS — SYSTOLIC BLOOD PRESSURE: 150 MMHG | DIASTOLIC BLOOD PRESSURE: 80 MMHG | HEART RATE: 66 BPM

## 2022-04-14 DIAGNOSIS — T82.855D STENOSIS OF CORONARY ARTERY STENT, SUBSEQUENT ENCOUNTER: Primary | ICD-10-CM

## 2022-04-19 ENCOUNTER — OFFICE VISIT (OUTPATIENT)
Dept: CARDIAC REHAB | Facility: HOSPITAL | Age: 72
End: 2022-04-19

## 2022-04-19 VITALS
OXYGEN SATURATION: 99 % | HEART RATE: 68 BPM | BODY MASS INDEX: 28.33 KG/M2 | WEIGHT: 186.29 LBS | DIASTOLIC BLOOD PRESSURE: 80 MMHG | SYSTOLIC BLOOD PRESSURE: 122 MMHG

## 2022-04-19 DIAGNOSIS — T82.855D STENOSIS OF CORONARY ARTERY STENT, SUBSEQUENT ENCOUNTER: Primary | ICD-10-CM

## 2022-04-20 ENCOUNTER — TELEPHONE (OUTPATIENT)
Dept: INTERNAL MEDICINE | Facility: CLINIC | Age: 72
End: 2022-04-20

## 2022-04-20 RX ORDER — LEVOTHYROXINE SODIUM 0.03 MG/1
25 TABLET ORAL DAILY
Qty: 90 TABLET | Refills: 3 | Status: SHIPPED | OUTPATIENT
Start: 2022-04-20

## 2022-04-21 ENCOUNTER — OFFICE VISIT (OUTPATIENT)
Dept: CARDIAC REHAB | Facility: HOSPITAL | Age: 72
End: 2022-04-21

## 2022-04-21 VITALS
SYSTOLIC BLOOD PRESSURE: 122 MMHG | WEIGHT: 186.07 LBS | DIASTOLIC BLOOD PRESSURE: 70 MMHG | OXYGEN SATURATION: 98 % | HEART RATE: 58 BPM | BODY MASS INDEX: 28.29 KG/M2

## 2022-04-21 DIAGNOSIS — T82.855D STENOSIS OF CORONARY ARTERY STENT, SUBSEQUENT ENCOUNTER: Primary | ICD-10-CM

## 2022-04-26 ENCOUNTER — OFFICE VISIT (OUTPATIENT)
Dept: CARDIAC REHAB | Facility: HOSPITAL | Age: 72
End: 2022-04-26

## 2022-04-26 VITALS
SYSTOLIC BLOOD PRESSURE: 130 MMHG | BODY MASS INDEX: 28.59 KG/M2 | WEIGHT: 188.05 LBS | OXYGEN SATURATION: 97 % | DIASTOLIC BLOOD PRESSURE: 80 MMHG | HEART RATE: 57 BPM

## 2022-04-26 DIAGNOSIS — T82.855D STENOSIS OF CORONARY ARTERY STENT, SUBSEQUENT ENCOUNTER: Primary | ICD-10-CM

## 2022-04-27 ENCOUNTER — OFFICE VISIT (OUTPATIENT)
Dept: INTERNAL MEDICINE | Facility: CLINIC | Age: 72
End: 2022-04-27

## 2022-04-27 VITALS
BODY MASS INDEX: 28.31 KG/M2 | HEIGHT: 68 IN | SYSTOLIC BLOOD PRESSURE: 125 MMHG | HEART RATE: 66 BPM | DIASTOLIC BLOOD PRESSURE: 72 MMHG | TEMPERATURE: 98.4 F | WEIGHT: 186.8 LBS | OXYGEN SATURATION: 96 %

## 2022-04-27 DIAGNOSIS — E53.8 B12 DEFICIENCY: ICD-10-CM

## 2022-04-27 DIAGNOSIS — E03.8 SUBCLINICAL HYPOTHYROIDISM: ICD-10-CM

## 2022-04-27 DIAGNOSIS — E03.9 HYPOTHYROIDISM, UNSPECIFIED TYPE: ICD-10-CM

## 2022-04-27 DIAGNOSIS — E55.9 VITAMIN D DEFICIENCY: ICD-10-CM

## 2022-04-27 DIAGNOSIS — Z79.899 ENCOUNTER FOR LONG-TERM (CURRENT) USE OF OTHER MEDICATIONS: ICD-10-CM

## 2022-04-27 DIAGNOSIS — E78.5 HYPERLIPIDEMIA, UNSPECIFIED HYPERLIPIDEMIA TYPE: ICD-10-CM

## 2022-04-27 DIAGNOSIS — I25.10 CORONARY ARTERY DISEASE INVOLVING NATIVE CORONARY ARTERY OF NATIVE HEART WITHOUT ANGINA PECTORIS: ICD-10-CM

## 2022-04-27 DIAGNOSIS — I10 PRIMARY HYPERTENSION: ICD-10-CM

## 2022-04-27 DIAGNOSIS — E11.9 TYPE 2 DIABETES MELLITUS WITHOUT COMPLICATION, UNSPECIFIED WHETHER LONG TERM INSULIN USE: Primary | ICD-10-CM

## 2022-04-27 PROCEDURE — 99214 OFFICE O/P EST MOD 30 MIN: CPT | Performed by: INTERNAL MEDICINE

## 2022-04-28 ENCOUNTER — OFFICE VISIT (OUTPATIENT)
Dept: CARDIAC REHAB | Facility: HOSPITAL | Age: 72
End: 2022-04-28

## 2022-04-28 VITALS
DIASTOLIC BLOOD PRESSURE: 78 MMHG | WEIGHT: 186.29 LBS | SYSTOLIC BLOOD PRESSURE: 130 MMHG | BODY MASS INDEX: 28.33 KG/M2 | HEART RATE: 55 BPM | OXYGEN SATURATION: 95 %

## 2022-04-28 DIAGNOSIS — T82.855D STENOSIS OF CORONARY ARTERY STENT, SUBSEQUENT ENCOUNTER: Primary | ICD-10-CM

## 2022-04-30 NOTE — PROGRESS NOTES
"Chief Complaint  Labs Only (Pt states that this is a routine appt, overall his health is good and he has no new issues)    Subjective  Patient has been doing well.    Yann Pardo presents to [facility]  History of Present Illness  71-year-old pleasant gentleman with CAD status post stent in 2005.  Resuming cardiac rehab.  Went 15 years prior to hiatus due to COVID pandemic.  Hyperlipidemia well-controlled on statin.  Subclinical hypothyroidism starting Synthroid low-dose.  Elevated PSA and being followed by Dr. Rubio.  History of rib fracture in the distant past after a fall.  Objective   Vital Signs:   /72 (BP Location: Right arm, Patient Position: Sitting, Cuff Size: Adult)   Pulse 66   Temp 98.4 °F (36.9 °C)   Ht 172.7 cm (67.99\")   Wt 84.7 kg (186 lb 12.8 oz)   SpO2 96%   BMI 28.41 kg/m²     Physical Exam  Vitals and nursing note reviewed.   Constitutional:       Appearance: Normal appearance.   HENT:      Head: Normocephalic.   Eyes:      Extraocular Movements: Extraocular movements intact.      Conjunctiva/sclera: Conjunctivae normal.   Cardiovascular:      Rate and Rhythm: Normal rate and regular rhythm.      Heart sounds: Normal heart sounds. No murmur heard.  Pulmonary:      Effort: Pulmonary effort is normal.      Breath sounds: Normal breath sounds. No wheezing or rales.   Abdominal:      General: Bowel sounds are normal.      Palpations: Abdomen is soft.      Tenderness: There is no abdominal tenderness. There is no guarding.   Musculoskeletal:         General: No swelling. Normal range of motion.   Skin:     General: Skin is warm and dry.   Neurological:      General: No focal deficit present.      Mental Status: He is alert. Mental status is at baseline.   Psychiatric:         Mood and Affect: Mood normal.         Thought Content: Thought content normal.        Result Review :  The following data was reviewed by: Katalina Abbott MD on " 04/27/2022:  CMP    CMP 7/15/21 12/30/21 4/1/22   Glucose 102 (A) 107 (A) 105 (A)   BUN 19 17 16   Creatinine 1.14 1.29 (A) 1.31 (A)   eGFR Non African Am 63 55 (A)    Sodium 143 142 141   Potassium 4.4 4.7 4.4   Chloride 109 (A) 108 (A) 105   Calcium 9.1 9.2 9.3   Albumin 4.20 4.70 4.50   Total Bilirubin 0.2 0.3 0.4   Alkaline Phosphatase 61 65 60   AST (SGOT) 28 40 25   ALT (SGPT) 37 47 (A) 44 (A)   (A) Abnormal value            CBC w/diff    CBC w/Diff 7/15/21 12/30/21 4/1/22   WBC 4.98 4.68 4.97   RBC 4.85 5.08 4.97   Hemoglobin 14.8 15.4 15.2   Hematocrit 43.7 45.7 45.3   MCV 90.1 90.0 91.1   MCH 30.5 30.3 30.6   MCHC 33.9 33.7 33.6   RDW 12.5 12.1 (A) 12.2 (A)   Platelets 172 176 166   Neutrophil Rel % 52.3 49.6 54.7   Immature Granulocyte Rel % 0.4 0.2 0.2   Lymphocyte Rel % 33.1 35.9 31.4   Monocyte Rel % 10.6 11.1 10.3   Eosinophil Rel % 2.2 1.9 2.2   Basophil Rel % 1.4 1.3 1.2   (A) Abnormal value            Lipid Panel    Lipid Panel 7/15/21 12/30/21 4/1/22   Total Cholesterol 146 173 138   Triglycerides 237 (A) 144 122   HDL Cholesterol 36 (A) 47 41   VLDL Cholesterol 39 25 22   LDL Cholesterol  71 101 (A) 75   LDL/HDL Ratio 1.74 2.07 1.77   (A) Abnormal value            TSH    TSH 7/15/21 12/30/21 4/1/22   TSH 6.190 (A) 6.160 (A) 3.600   (A) Abnormal value            UA    Urinalysis 4/1/22 4/1/22    0644 0644   Squamous Epithelial Cells, UA  0-2   Specific Gravity, UA 1.026    Ketones, UA Negative    Blood, UA Negative    Leukocytes, UA Negative    Nitrite, UA Negative    RBC, UA  None Seen   WBC, UA  0-2   Bacteria, UA  None Seen           PSA    PSA 4/1/22   PSA 7.240 (A)   (A) Abnormal value                          Assessment and Plan   Diagnoses and all orders for this visit:    1. Type 2 diabetes mellitus without complication, unspecified whether long term insulin use (HCC) (Primary)  -     Hemoglobin A1c; Future    2. B12 deficiency  -     Vitamin B12; Future  -     Folate; Future    3.  Encounter for long-term (current) use of other medications  -     CBC w AUTO Differential; Future    4. Primary hypertension  -     Comprehensive metabolic panel; Future    5. Hyperlipidemia, unspecified hyperlipidemia type  Assessment & Plan:  Lipid panel well controlled.  Plan: Continue Crestor 40 mg at bedtime.  Repeat lipid panel return the office.    Orders:  -     Lipid panel; Future    6. Hypothyroidism, unspecified type  -     TSH+Free T4; Future    7. Vitamin D deficiency  -     Vitamin D 25 hydroxy; Future    8. Coronary artery disease involving native coronary artery of native heart without angina pectoris  Assessment & Plan:  Patient with CAD and stent in 2005.  He has been stable since that time.  Is followed least once a year if not every 6 months with cardiology in [place].  Assessment: CAD status post stents stable.  He continues to go to cardiac rehab.  He there was a hiatus of about 2 years because of the COVID pandemic but that has fortunately been restarted.  Plan: Continue risk factor control.  Has nitroglycerin spray to use as needed.        9. Subclinical hypothyroidism  Assessment & Plan:  Had subclinical hypothyroidism with mildly elevated TSH up to 6-7 range.  Synthroid was started at 25 mcg last visit.  He looks more energetic and more like his old self again.  TSH is in normal range.  Assessment: Hypothyroid in good range.  Plan: Continue Synthroid 25 mcg daily.  TFTs on return the office.          Follow Up Return in about 3 months (around 7/27/2022).  Patient was given instructions and counseling regarding his condition or for health maintenance advice. Please see specific information pulled into the AVS if appropriate.

## 2022-04-30 NOTE — ASSESSMENT & PLAN NOTE
Lipid panel well controlled.  Plan: Continue Crestor 40 mg at bedtime.  Repeat lipid panel return the office.

## 2022-04-30 NOTE — ASSESSMENT & PLAN NOTE
Patient with CAD and stent in 2005.  He has been stable since that time.  Is followed least once a year if not every 6 months with cardiology in [place].  Assessment: CAD status post stents stable.  He continues to go to cardiac rehab.  He there was a hiatus of about 2 years because of the COVID pandemic but that has fortunately been restarted.  Plan: Continue risk factor control.  Has nitroglycerin spray to use as needed.

## 2022-04-30 NOTE — ASSESSMENT & PLAN NOTE
Had subclinical hypothyroidism with mildly elevated TSH up to 6-7 range.  Synthroid was started at 25 mcg last visit.  He looks more energetic and more like his old self again.  TSH is in normal range.  Assessment: Hypothyroid in good range.  Plan: Continue Synthroid 25 mcg daily.  TFTs on return the office.

## 2022-05-03 ENCOUNTER — OFFICE VISIT (OUTPATIENT)
Dept: CARDIAC REHAB | Facility: HOSPITAL | Age: 72
End: 2022-05-03

## 2022-05-03 VITALS
OXYGEN SATURATION: 96 % | BODY MASS INDEX: 28.53 KG/M2 | WEIGHT: 187.61 LBS | DIASTOLIC BLOOD PRESSURE: 82 MMHG | HEART RATE: 68 BPM | SYSTOLIC BLOOD PRESSURE: 140 MMHG

## 2022-05-03 DIAGNOSIS — T82.855D STENOSIS OF CORONARY ARTERY STENT, SUBSEQUENT ENCOUNTER: Primary | ICD-10-CM

## 2022-05-05 ENCOUNTER — OFFICE VISIT (OUTPATIENT)
Dept: CARDIAC REHAB | Facility: HOSPITAL | Age: 72
End: 2022-05-05

## 2022-05-05 VITALS
BODY MASS INDEX: 28.13 KG/M2 | WEIGHT: 184.97 LBS | DIASTOLIC BLOOD PRESSURE: 80 MMHG | HEART RATE: 60 BPM | SYSTOLIC BLOOD PRESSURE: 150 MMHG | OXYGEN SATURATION: 98 %

## 2022-05-05 DIAGNOSIS — T82.855D STENOSIS OF CORONARY ARTERY STENT, SUBSEQUENT ENCOUNTER: Primary | ICD-10-CM

## 2022-05-10 ENCOUNTER — OFFICE VISIT (OUTPATIENT)
Dept: CARDIAC REHAB | Facility: HOSPITAL | Age: 72
End: 2022-05-10

## 2022-05-10 VITALS
WEIGHT: 187.83 LBS | DIASTOLIC BLOOD PRESSURE: 80 MMHG | BODY MASS INDEX: 28.57 KG/M2 | OXYGEN SATURATION: 96 % | SYSTOLIC BLOOD PRESSURE: 130 MMHG | HEART RATE: 55 BPM

## 2022-05-10 DIAGNOSIS — T82.855D STENOSIS OF CORONARY ARTERY STENT, SUBSEQUENT ENCOUNTER: Primary | ICD-10-CM

## 2022-05-12 ENCOUNTER — OFFICE VISIT (OUTPATIENT)
Dept: CARDIAC REHAB | Facility: HOSPITAL | Age: 72
End: 2022-05-12

## 2022-05-12 VITALS
HEART RATE: 55 BPM | OXYGEN SATURATION: 96 % | WEIGHT: 185.19 LBS | DIASTOLIC BLOOD PRESSURE: 70 MMHG | BODY MASS INDEX: 28.16 KG/M2 | SYSTOLIC BLOOD PRESSURE: 120 MMHG

## 2022-05-12 DIAGNOSIS — T82.855D STENOSIS OF CORONARY ARTERY STENT, SUBSEQUENT ENCOUNTER: Primary | ICD-10-CM

## 2022-05-17 ENCOUNTER — OFFICE VISIT (OUTPATIENT)
Dept: CARDIAC REHAB | Facility: HOSPITAL | Age: 72
End: 2022-05-17

## 2022-05-17 VITALS
HEART RATE: 60 BPM | WEIGHT: 185.41 LBS | SYSTOLIC BLOOD PRESSURE: 126 MMHG | OXYGEN SATURATION: 96 % | BODY MASS INDEX: 28.2 KG/M2 | DIASTOLIC BLOOD PRESSURE: 80 MMHG

## 2022-05-17 DIAGNOSIS — T82.855D STENOSIS OF CORONARY ARTERY STENT, SUBSEQUENT ENCOUNTER: Primary | ICD-10-CM

## 2022-05-19 ENCOUNTER — OFFICE VISIT (OUTPATIENT)
Dept: CARDIAC REHAB | Facility: HOSPITAL | Age: 72
End: 2022-05-19

## 2022-05-19 VITALS
HEART RATE: 69 BPM | OXYGEN SATURATION: 97 % | SYSTOLIC BLOOD PRESSURE: 120 MMHG | BODY MASS INDEX: 28.37 KG/M2 | DIASTOLIC BLOOD PRESSURE: 70 MMHG | WEIGHT: 186.51 LBS

## 2022-05-19 DIAGNOSIS — T82.855D STENOSIS OF CORONARY ARTERY STENT, SUBSEQUENT ENCOUNTER: Primary | ICD-10-CM

## 2022-05-24 ENCOUNTER — OFFICE VISIT (OUTPATIENT)
Dept: CARDIAC REHAB | Facility: HOSPITAL | Age: 72
End: 2022-05-24

## 2022-05-24 VITALS
SYSTOLIC BLOOD PRESSURE: 156 MMHG | WEIGHT: 189.15 LBS | HEART RATE: 58 BPM | BODY MASS INDEX: 28.77 KG/M2 | DIASTOLIC BLOOD PRESSURE: 80 MMHG | OXYGEN SATURATION: 97 %

## 2022-05-24 DIAGNOSIS — T82.855D STENOSIS OF CORONARY ARTERY STENT, SUBSEQUENT ENCOUNTER: Primary | ICD-10-CM

## 2022-05-25 RX ORDER — METOPROLOL SUCCINATE 25 MG/1
TABLET, EXTENDED RELEASE ORAL
Qty: 45 TABLET | Refills: 0 | Status: SHIPPED | OUTPATIENT
Start: 2022-05-25 | End: 2022-08-30 | Stop reason: SDUPTHER

## 2022-05-26 ENCOUNTER — OFFICE VISIT (OUTPATIENT)
Dept: CARDIAC REHAB | Facility: HOSPITAL | Age: 72
End: 2022-05-26

## 2022-05-26 VITALS
WEIGHT: 185.63 LBS | HEART RATE: 66 BPM | BODY MASS INDEX: 28.23 KG/M2 | DIASTOLIC BLOOD PRESSURE: 78 MMHG | OXYGEN SATURATION: 96 % | SYSTOLIC BLOOD PRESSURE: 118 MMHG | RESPIRATION RATE: 18 BRPM

## 2022-05-26 DIAGNOSIS — T82.855D STENOSIS OF CORONARY ARTERY STENT, SUBSEQUENT ENCOUNTER: Primary | ICD-10-CM

## 2022-05-31 ENCOUNTER — OFFICE VISIT (OUTPATIENT)
Dept: CARDIAC REHAB | Facility: HOSPITAL | Age: 72
End: 2022-05-31

## 2022-05-31 VITALS
OXYGEN SATURATION: 96 % | HEART RATE: 57 BPM | BODY MASS INDEX: 28.77 KG/M2 | DIASTOLIC BLOOD PRESSURE: 82 MMHG | SYSTOLIC BLOOD PRESSURE: 142 MMHG | WEIGHT: 189.15 LBS

## 2022-05-31 DIAGNOSIS — T82.855D STENOSIS OF CORONARY ARTERY STENT, SUBSEQUENT ENCOUNTER: Primary | ICD-10-CM

## 2022-06-02 ENCOUNTER — OFFICE VISIT (OUTPATIENT)
Dept: CARDIAC REHAB | Facility: HOSPITAL | Age: 72
End: 2022-06-02

## 2022-06-02 VITALS
DIASTOLIC BLOOD PRESSURE: 82 MMHG | WEIGHT: 186.29 LBS | HEART RATE: 66 BPM | SYSTOLIC BLOOD PRESSURE: 124 MMHG | BODY MASS INDEX: 28.33 KG/M2 | OXYGEN SATURATION: 97 %

## 2022-06-02 DIAGNOSIS — T82.855D STENOSIS OF CORONARY ARTERY STENT, SUBSEQUENT ENCOUNTER: Primary | ICD-10-CM

## 2022-06-07 ENCOUNTER — OFFICE VISIT (OUTPATIENT)
Dept: CARDIAC REHAB | Facility: HOSPITAL | Age: 72
End: 2022-06-07

## 2022-06-07 VITALS
DIASTOLIC BLOOD PRESSURE: 84 MMHG | SYSTOLIC BLOOD PRESSURE: 132 MMHG | WEIGHT: 186.95 LBS | HEART RATE: 65 BPM | OXYGEN SATURATION: 97 % | BODY MASS INDEX: 28.43 KG/M2

## 2022-06-07 DIAGNOSIS — T82.855D STENOSIS OF CORONARY ARTERY STENT, SUBSEQUENT ENCOUNTER: Primary | ICD-10-CM

## 2022-06-09 ENCOUNTER — OFFICE VISIT (OUTPATIENT)
Dept: CARDIAC REHAB | Facility: HOSPITAL | Age: 72
End: 2022-06-09

## 2022-06-09 VITALS
BODY MASS INDEX: 28.63 KG/M2 | HEART RATE: 62 BPM | WEIGHT: 188.27 LBS | DIASTOLIC BLOOD PRESSURE: 76 MMHG | OXYGEN SATURATION: 96 % | SYSTOLIC BLOOD PRESSURE: 140 MMHG

## 2022-06-09 DIAGNOSIS — T82.855D STENOSIS OF CORONARY ARTERY STENT, SUBSEQUENT ENCOUNTER: Primary | ICD-10-CM

## 2022-06-14 ENCOUNTER — OFFICE VISIT (OUTPATIENT)
Dept: CARDIAC REHAB | Facility: HOSPITAL | Age: 72
End: 2022-06-14

## 2022-06-14 VITALS
DIASTOLIC BLOOD PRESSURE: 72 MMHG | WEIGHT: 185.85 LBS | HEART RATE: 62 BPM | OXYGEN SATURATION: 95 % | BODY MASS INDEX: 28.26 KG/M2 | SYSTOLIC BLOOD PRESSURE: 140 MMHG

## 2022-06-14 DIAGNOSIS — T82.855D STENOSIS OF CORONARY ARTERY STENT, SUBSEQUENT ENCOUNTER: Primary | ICD-10-CM

## 2022-06-16 ENCOUNTER — OFFICE VISIT (OUTPATIENT)
Dept: CARDIAC REHAB | Facility: HOSPITAL | Age: 72
End: 2022-06-16

## 2022-06-16 VITALS
DIASTOLIC BLOOD PRESSURE: 76 MMHG | BODY MASS INDEX: 28.13 KG/M2 | SYSTOLIC BLOOD PRESSURE: 118 MMHG | WEIGHT: 184.97 LBS | OXYGEN SATURATION: 96 % | HEART RATE: 55 BPM

## 2022-06-16 DIAGNOSIS — T82.855D STENOSIS OF CORONARY ARTERY STENT, SUBSEQUENT ENCOUNTER: Primary | ICD-10-CM

## 2022-06-21 ENCOUNTER — OFFICE VISIT (OUTPATIENT)
Dept: CARDIAC REHAB | Facility: HOSPITAL | Age: 72
End: 2022-06-21

## 2022-06-21 VITALS
WEIGHT: 188.49 LBS | OXYGEN SATURATION: 96 % | HEART RATE: 66 BPM | BODY MASS INDEX: 28.67 KG/M2 | DIASTOLIC BLOOD PRESSURE: 82 MMHG | SYSTOLIC BLOOD PRESSURE: 122 MMHG

## 2022-06-21 DIAGNOSIS — T82.855D STENOSIS OF CORONARY ARTERY STENT, SUBSEQUENT ENCOUNTER: Primary | ICD-10-CM

## 2022-06-23 ENCOUNTER — OFFICE VISIT (OUTPATIENT)
Dept: CARDIAC REHAB | Facility: HOSPITAL | Age: 72
End: 2022-06-23

## 2022-06-23 VITALS
DIASTOLIC BLOOD PRESSURE: 80 MMHG | WEIGHT: 186.29 LBS | OXYGEN SATURATION: 96 % | BODY MASS INDEX: 28.33 KG/M2 | SYSTOLIC BLOOD PRESSURE: 158 MMHG | HEART RATE: 60 BPM

## 2022-06-23 DIAGNOSIS — T82.855D STENOSIS OF CORONARY ARTERY STENT, SUBSEQUENT ENCOUNTER: Primary | ICD-10-CM

## 2022-06-28 ENCOUNTER — OFFICE VISIT (OUTPATIENT)
Dept: CARDIAC REHAB | Facility: HOSPITAL | Age: 72
End: 2022-06-28

## 2022-06-28 VITALS — OXYGEN SATURATION: 97 % | HEART RATE: 61 BPM | SYSTOLIC BLOOD PRESSURE: 152 MMHG | DIASTOLIC BLOOD PRESSURE: 80 MMHG

## 2022-06-28 DIAGNOSIS — T82.855D STENOSIS OF CORONARY ARTERY STENT, SUBSEQUENT ENCOUNTER: Primary | ICD-10-CM

## 2022-06-30 ENCOUNTER — APPOINTMENT (OUTPATIENT)
Dept: CARDIAC REHAB | Facility: HOSPITAL | Age: 72
End: 2022-06-30

## 2022-07-05 ENCOUNTER — OFFICE VISIT (OUTPATIENT)
Dept: CARDIAC REHAB | Facility: HOSPITAL | Age: 72
End: 2022-07-05

## 2022-07-05 VITALS
HEART RATE: 54 BPM | OXYGEN SATURATION: 96 % | SYSTOLIC BLOOD PRESSURE: 168 MMHG | WEIGHT: 188.05 LBS | BODY MASS INDEX: 28.6 KG/M2 | DIASTOLIC BLOOD PRESSURE: 70 MMHG

## 2022-07-05 DIAGNOSIS — T82.855D STENOSIS OF CORONARY ARTERY STENT, SUBSEQUENT ENCOUNTER: Primary | ICD-10-CM

## 2022-07-05 RX ORDER — ROSUVASTATIN CALCIUM 40 MG/1
TABLET, COATED ORAL
Qty: 90 TABLET | Refills: 1 | Status: SHIPPED | OUTPATIENT
Start: 2022-07-05

## 2022-07-07 ENCOUNTER — OFFICE VISIT (OUTPATIENT)
Dept: CARDIAC REHAB | Facility: HOSPITAL | Age: 72
End: 2022-07-07

## 2022-07-07 VITALS
HEART RATE: 68 BPM | BODY MASS INDEX: 28.2 KG/M2 | OXYGEN SATURATION: 95 % | WEIGHT: 185.41 LBS | SYSTOLIC BLOOD PRESSURE: 142 MMHG | DIASTOLIC BLOOD PRESSURE: 78 MMHG

## 2022-07-07 DIAGNOSIS — T82.855D STENOSIS OF CORONARY ARTERY STENT, SUBSEQUENT ENCOUNTER: Primary | ICD-10-CM

## 2022-07-08 ENCOUNTER — LAB (OUTPATIENT)
Dept: LAB | Facility: HOSPITAL | Age: 72
End: 2022-07-08

## 2022-07-08 DIAGNOSIS — E78.5 HYPERLIPIDEMIA, UNSPECIFIED HYPERLIPIDEMIA TYPE: ICD-10-CM

## 2022-07-08 DIAGNOSIS — E55.9 VITAMIN D DEFICIENCY: ICD-10-CM

## 2022-07-08 DIAGNOSIS — E11.9 TYPE 2 DIABETES MELLITUS WITHOUT COMPLICATION, UNSPECIFIED WHETHER LONG TERM INSULIN USE: ICD-10-CM

## 2022-07-08 DIAGNOSIS — E53.8 B12 DEFICIENCY: ICD-10-CM

## 2022-07-08 DIAGNOSIS — E03.9 HYPOTHYROIDISM, UNSPECIFIED TYPE: ICD-10-CM

## 2022-07-08 DIAGNOSIS — Z79.899 ENCOUNTER FOR LONG-TERM (CURRENT) USE OF OTHER MEDICATIONS: ICD-10-CM

## 2022-07-08 DIAGNOSIS — I10 PRIMARY HYPERTENSION: ICD-10-CM

## 2022-07-08 LAB
25(OH)D3 SERPL-MCNC: 33.8 NG/ML (ref 30–100)
ALBUMIN SERPL-MCNC: 4.4 G/DL (ref 3.5–5.2)
ALBUMIN/GLOB SERPL: 2 G/DL
ALP SERPL-CCNC: 62 U/L (ref 39–117)
ALT SERPL W P-5'-P-CCNC: 32 U/L (ref 1–41)
ANION GAP SERPL CALCULATED.3IONS-SCNC: 8.3 MMOL/L (ref 5–15)
AST SERPL-CCNC: 27 U/L (ref 1–40)
BASOPHILS # BLD AUTO: 0.06 10*3/MM3 (ref 0–0.2)
BASOPHILS NFR BLD AUTO: 1.2 % (ref 0–1.5)
BILIRUB SERPL-MCNC: 0.3 MG/DL (ref 0–1.2)
BUN SERPL-MCNC: 17 MG/DL (ref 8–23)
BUN/CREAT SERPL: 13.6 (ref 7–25)
CALCIUM SPEC-SCNC: 8.8 MG/DL (ref 8.6–10.5)
CHLORIDE SERPL-SCNC: 105 MMOL/L (ref 98–107)
CHOLEST SERPL-MCNC: 137 MG/DL (ref 0–200)
CO2 SERPL-SCNC: 24.7 MMOL/L (ref 22–29)
CREAT SERPL-MCNC: 1.25 MG/DL (ref 0.76–1.27)
DEPRECATED RDW RBC AUTO: 38.1 FL (ref 37–54)
EGFRCR SERPLBLD CKD-EPI 2021: 60.8 ML/MIN/1.73
EOSINOPHIL # BLD AUTO: 0.12 10*3/MM3 (ref 0–0.4)
EOSINOPHIL NFR BLD AUTO: 2.5 % (ref 0.3–6.2)
ERYTHROCYTE [DISTWIDTH] IN BLOOD BY AUTOMATED COUNT: 12.1 % (ref 12.3–15.4)
FOLATE SERPL-MCNC: 8.79 NG/ML (ref 4.78–24.2)
GLOBULIN UR ELPH-MCNC: 2.2 GM/DL
GLUCOSE SERPL-MCNC: 110 MG/DL (ref 65–99)
HBA1C MFR BLD: 6 % (ref 4.8–5.6)
HCT VFR BLD AUTO: 44.1 % (ref 37.5–51)
HDLC SERPL-MCNC: 41 MG/DL (ref 40–60)
HGB BLD-MCNC: 15.5 G/DL (ref 13–17.7)
IMM GRANULOCYTES # BLD AUTO: 0 10*3/MM3 (ref 0–0.05)
IMM GRANULOCYTES NFR BLD AUTO: 0 % (ref 0–0.5)
LDLC SERPL CALC-MCNC: 72 MG/DL (ref 0–100)
LDLC/HDLC SERPL: 1.69 {RATIO}
LYMPHOCYTES # BLD AUTO: 1.77 10*3/MM3 (ref 0.7–3.1)
LYMPHOCYTES NFR BLD AUTO: 36.7 % (ref 19.6–45.3)
MCH RBC QN AUTO: 30.8 PG (ref 26.6–33)
MCHC RBC AUTO-ENTMCNC: 35.1 G/DL (ref 31.5–35.7)
MCV RBC AUTO: 87.5 FL (ref 79–97)
MONOCYTES # BLD AUTO: 0.43 10*3/MM3 (ref 0.1–0.9)
MONOCYTES NFR BLD AUTO: 8.9 % (ref 5–12)
NEUTROPHILS NFR BLD AUTO: 2.44 10*3/MM3 (ref 1.7–7)
NEUTROPHILS NFR BLD AUTO: 50.7 % (ref 42.7–76)
NRBC BLD AUTO-RTO: 0 /100 WBC (ref 0–0.2)
PLATELET # BLD AUTO: 164 10*3/MM3 (ref 140–450)
PMV BLD AUTO: 10 FL (ref 6–12)
POTASSIUM SERPL-SCNC: 4.3 MMOL/L (ref 3.5–5.2)
PROT SERPL-MCNC: 6.6 G/DL (ref 6–8.5)
RBC # BLD AUTO: 5.04 10*6/MM3 (ref 4.14–5.8)
SODIUM SERPL-SCNC: 138 MMOL/L (ref 136–145)
T4 FREE SERPL-MCNC: 1.15 NG/DL (ref 0.93–1.7)
TRIGL SERPL-MCNC: 134 MG/DL (ref 0–150)
TSH SERPL DL<=0.05 MIU/L-ACNC: 4.96 UIU/ML (ref 0.27–4.2)
VIT B12 BLD-MCNC: 469 PG/ML (ref 211–946)
VLDLC SERPL-MCNC: 24 MG/DL (ref 5–40)
WBC NRBC COR # BLD: 4.82 10*3/MM3 (ref 3.4–10.8)

## 2022-07-08 PROCEDURE — 82746 ASSAY OF FOLIC ACID SERUM: CPT

## 2022-07-08 PROCEDURE — 80050 GENERAL HEALTH PANEL: CPT

## 2022-07-08 PROCEDURE — 84439 ASSAY OF FREE THYROXINE: CPT

## 2022-07-08 PROCEDURE — 82607 VITAMIN B-12: CPT

## 2022-07-08 PROCEDURE — 80061 LIPID PANEL: CPT

## 2022-07-08 PROCEDURE — 83036 HEMOGLOBIN GLYCOSYLATED A1C: CPT

## 2022-07-08 PROCEDURE — 82306 VITAMIN D 25 HYDROXY: CPT

## 2022-07-08 PROCEDURE — 36415 COLL VENOUS BLD VENIPUNCTURE: CPT

## 2022-07-12 ENCOUNTER — OFFICE VISIT (OUTPATIENT)
Dept: CARDIAC REHAB | Facility: HOSPITAL | Age: 72
End: 2022-07-12

## 2022-07-12 VITALS
HEART RATE: 64 BPM | WEIGHT: 188.05 LBS | OXYGEN SATURATION: 94 % | BODY MASS INDEX: 28.6 KG/M2 | DIASTOLIC BLOOD PRESSURE: 70 MMHG | SYSTOLIC BLOOD PRESSURE: 124 MMHG

## 2022-07-12 DIAGNOSIS — T82.855D STENOSIS OF CORONARY ARTERY STENT, SUBSEQUENT ENCOUNTER: Primary | ICD-10-CM

## 2022-07-14 ENCOUNTER — OFFICE VISIT (OUTPATIENT)
Dept: CARDIAC REHAB | Facility: HOSPITAL | Age: 72
End: 2022-07-14

## 2022-07-14 ENCOUNTER — TELEPHONE (OUTPATIENT)
Dept: INTERNAL MEDICINE | Facility: CLINIC | Age: 72
End: 2022-07-14

## 2022-07-14 VITALS
OXYGEN SATURATION: 97 % | WEIGHT: 186.29 LBS | DIASTOLIC BLOOD PRESSURE: 80 MMHG | BODY MASS INDEX: 28.33 KG/M2 | SYSTOLIC BLOOD PRESSURE: 140 MMHG | HEART RATE: 63 BPM

## 2022-07-14 DIAGNOSIS — T82.855D STENOSIS OF CORONARY ARTERY STENT, SUBSEQUENT ENCOUNTER: Primary | ICD-10-CM

## 2022-07-14 NOTE — TELEPHONE ENCOUNTER
Please schedule patient for routine checkup last visit was January 2022 and had some recent labs done thanks   Pt brought to the ED by EMS for surgery center c/o CP/SOB. In post op client complained of chest pressure - increases when taking in a breath. Pt able to ambulate and speak full sentences.

## 2022-07-19 ENCOUNTER — OFFICE VISIT (OUTPATIENT)
Dept: CARDIAC REHAB | Facility: HOSPITAL | Age: 72
End: 2022-07-19

## 2022-07-19 VITALS
SYSTOLIC BLOOD PRESSURE: 122 MMHG | OXYGEN SATURATION: 96 % | WEIGHT: 186.51 LBS | BODY MASS INDEX: 28.37 KG/M2 | HEART RATE: 68 BPM | DIASTOLIC BLOOD PRESSURE: 80 MMHG

## 2022-07-19 DIAGNOSIS — T82.855D STENOSIS OF CORONARY ARTERY STENT, SUBSEQUENT ENCOUNTER: Primary | ICD-10-CM

## 2022-07-21 ENCOUNTER — OFFICE VISIT (OUTPATIENT)
Dept: CARDIAC REHAB | Facility: HOSPITAL | Age: 72
End: 2022-07-21

## 2022-07-21 VITALS
BODY MASS INDEX: 28.26 KG/M2 | SYSTOLIC BLOOD PRESSURE: 160 MMHG | DIASTOLIC BLOOD PRESSURE: 80 MMHG | HEART RATE: 70 BPM | WEIGHT: 185.85 LBS

## 2022-07-21 DIAGNOSIS — T82.855D STENOSIS OF CORONARY ARTERY STENT, SUBSEQUENT ENCOUNTER: Primary | ICD-10-CM

## 2022-07-26 ENCOUNTER — OFFICE VISIT (OUTPATIENT)
Dept: CARDIAC REHAB | Facility: HOSPITAL | Age: 72
End: 2022-07-26

## 2022-07-26 VITALS
DIASTOLIC BLOOD PRESSURE: 78 MMHG | WEIGHT: 186.73 LBS | HEART RATE: 66 BPM | OXYGEN SATURATION: 97 % | BODY MASS INDEX: 28.4 KG/M2 | SYSTOLIC BLOOD PRESSURE: 146 MMHG

## 2022-07-26 DIAGNOSIS — T82.855D STENOSIS OF CORONARY ARTERY STENT, SUBSEQUENT ENCOUNTER: Primary | ICD-10-CM

## 2022-07-28 ENCOUNTER — OFFICE VISIT (OUTPATIENT)
Dept: CARDIAC REHAB | Facility: HOSPITAL | Age: 72
End: 2022-07-28

## 2022-07-28 VITALS
HEART RATE: 56 BPM | SYSTOLIC BLOOD PRESSURE: 146 MMHG | DIASTOLIC BLOOD PRESSURE: 78 MMHG | WEIGHT: 186.73 LBS | OXYGEN SATURATION: 98 % | BODY MASS INDEX: 28.4 KG/M2

## 2022-07-28 DIAGNOSIS — T82.855D STENOSIS OF CORONARY ARTERY STENT, SUBSEQUENT ENCOUNTER: Primary | ICD-10-CM

## 2022-08-02 ENCOUNTER — OFFICE VISIT (OUTPATIENT)
Dept: CARDIAC REHAB | Facility: HOSPITAL | Age: 72
End: 2022-08-02

## 2022-08-02 VITALS
SYSTOLIC BLOOD PRESSURE: 130 MMHG | BODY MASS INDEX: 28.37 KG/M2 | WEIGHT: 186.51 LBS | HEART RATE: 63 BPM | DIASTOLIC BLOOD PRESSURE: 80 MMHG | OXYGEN SATURATION: 96 %

## 2022-08-02 DIAGNOSIS — T82.855D STENOSIS OF CORONARY ARTERY STENT, SUBSEQUENT ENCOUNTER: Primary | ICD-10-CM

## 2022-08-02 PROBLEM — E11.9 TYPE 2 DIABETES MELLITUS, WITHOUT LONG-TERM CURRENT USE OF INSULIN: Status: ACTIVE | Noted: 2022-08-02

## 2022-08-03 ENCOUNTER — OFFICE VISIT (OUTPATIENT)
Dept: INTERNAL MEDICINE | Facility: CLINIC | Age: 72
End: 2022-08-03

## 2022-08-03 VITALS
DIASTOLIC BLOOD PRESSURE: 80 MMHG | HEART RATE: 61 BPM | HEIGHT: 68 IN | SYSTOLIC BLOOD PRESSURE: 145 MMHG | RESPIRATION RATE: 12 BRPM | OXYGEN SATURATION: 99 % | TEMPERATURE: 98.4 F | WEIGHT: 190 LBS | BODY MASS INDEX: 28.79 KG/M2

## 2022-08-03 DIAGNOSIS — E78.00 PURE HYPERCHOLESTEROLEMIA: ICD-10-CM

## 2022-08-03 DIAGNOSIS — I10 PRIMARY HYPERTENSION: ICD-10-CM

## 2022-08-03 DIAGNOSIS — E53.8 B12 DEFICIENCY: ICD-10-CM

## 2022-08-03 DIAGNOSIS — E55.9 VITAMIN D DEFICIENCY: ICD-10-CM

## 2022-08-03 DIAGNOSIS — E03.8 SUBCLINICAL HYPOTHYROIDISM: ICD-10-CM

## 2022-08-03 DIAGNOSIS — E11.65 TYPE 2 DIABETES MELLITUS WITH HYPERGLYCEMIA, WITHOUT LONG-TERM CURRENT USE OF INSULIN: ICD-10-CM

## 2022-08-03 DIAGNOSIS — R97.20 ELEVATED PSA: ICD-10-CM

## 2022-08-03 DIAGNOSIS — I25.10 CORONARY ARTERY DISEASE INVOLVING NATIVE CORONARY ARTERY OF NATIVE HEART WITHOUT ANGINA PECTORIS: Primary | ICD-10-CM

## 2022-08-03 DIAGNOSIS — Z79.899 ENCOUNTER FOR LONG-TERM (CURRENT) USE OF OTHER MEDICATIONS: ICD-10-CM

## 2022-08-03 PROCEDURE — 99214 OFFICE O/P EST MOD 30 MIN: CPT | Performed by: INTERNAL MEDICINE

## 2022-08-03 NOTE — ASSESSMENT & PLAN NOTE
Assessment CAD with stent drug-eluting.  2005.  Stable.  Plan: Patient follows with cardiologist every 6 to 12 months in [place].  Continue Toprol-XL 25 mg daily, rosuvastatin 40 mg daily.  Try to increase his exercise including treadmill at home.

## 2022-08-03 NOTE — ASSESSMENT & PLAN NOTE
Had subclinical hypothyroidism with mildly elevated TSH up to 6-7 range.in past  Synthroid was started at 25 mcg a few  Visits ago  by Dr Abbott.  He looks more energetic and more like his old self again.  TSH is slightly elevated this time  Assessment: Hypothyroid in fair range.  Plan: Continue Synthroid 25 mcg daily.  TFTs on return the office.

## 2022-08-03 NOTE — ASSESSMENT & PLAN NOTE
A1c equals 6 patient not on any medications discussed lifestyle changes low-carb diet.  Patient is active-goes hunting frequently for elk and deer and 2 [employer] activities and this outside frequently.

## 2022-08-03 NOTE — ASSESSMENT & PLAN NOTE
Patient follows with Dr. Rubio for elevated PSA.  He saw him in December.  Reschedule him for December 2022.  Abnormal PSA.  Has had full evaluation.

## 2022-08-03 NOTE — PROGRESS NOTES
"CHIEF COMPLAINT  Yann Pardo presents to [facility] for follow-up of Labs Only (Routine office visit with labs. ), Establish Care (GRACE from ), and Spot on arm  (Pt has a spot on left arm that has been there awhile and he would like it looked at. No pain or itching. ).    HPI    71-year-old pleasant gentleman first visit with me and last saw Dr. Abbott April 27, 2022  Records reviewed labs reviewed, medications reviewed in detail plan of care is as follows.   Past medical history significant for-CAD status post stent in 2005.  Resuming cardiac rehab.  Went 15 years prior but was unable to continue due to COVID pandemic.  Hyperlipidemia well-controlled on statin.  Subclinical hypothyroidism starting Synthroid low-dose.  Elevated PSA and being followed by Dr. Rubio.  History of rib fracture in the distant past after a fall.    PFSH reviewed.  ROS -significant for no chest pain no increased shortness of air.    OBJECTIVE  Vital Signs  Vitals:    08/03/22 1505   BP: 145/80   BP Location: Right arm   Patient Position: Sitting   Cuff Size: Adult   Pulse: 61   Resp: 12   Temp: 98.4 °F (36.9 °C)   TempSrc: Temporal   SpO2: 99%   Weight: 86.2 kg (190 lb)   Height: 172.7 cm (67.99\")      Body mass index is 28.9 kg/m².    Physical Exam  Vitals and nursing note reviewed.   Constitutional:       Appearance: Normal appearance.   HENT:      Head: Normocephalic and atraumatic.      Nose: Nose normal.   Eyes:      Extraocular Movements: Extraocular movements intact.      Pupils: Pupils are equal, round, and reactive to light.   Cardiovascular:      Rate and Rhythm: Normal rate and regular rhythm.   Pulmonary:      Effort: Pulmonary effort is normal.      Breath sounds: Normal breath sounds.   Abdominal:      General: Abdomen is flat.      Palpations: Abdomen is soft.   Musculoskeletal:      Cervical back: Normal range of motion and neck supple.   Skin:     General: Skin is warm and " dry.   Neurological:      General: No focal deficit present.      Mental Status: He is alert and oriented to person, place, and time.   Psychiatric:         Mood and Affect: Mood normal.         Behavior: Behavior normal.          RESULTS REVIEW  No results found for: PROBNP, BNP  CMP    CMP 12/30/21 4/1/22 7/8/22   Glucose 107 (A) 105 (A) 110 (A)   BUN 17 16 17   Creatinine 1.29 (A) 1.31 (A) 1.25   eGFR Non African Am 55 (A)     Sodium 142 141 138   Potassium 4.7 4.4 4.3   Chloride 108 (A) 105 105   Calcium 9.2 9.3 8.8   Albumin 4.70 4.50 4.40   Total Bilirubin 0.3 0.4 0.3   Alkaline Phosphatase 65 60 62   AST (SGOT) 40 25 27   ALT (SGPT) 47 (A) 44 (A) 32   (A) Abnormal value            CBC w/diff    CBC w/Diff 12/30/21 4/1/22 7/8/22   WBC 4.68 4.97 4.82   RBC 5.08 4.97 5.04   Hemoglobin 15.4 15.2 15.5   Hematocrit 45.7 45.3 44.1   MCV 90.0 91.1 87.5   MCH 30.3 30.6 30.8   MCHC 33.7 33.6 35.1   RDW 12.1 (A) 12.2 (A) 12.1 (A)   Platelets 176 166 164   Neutrophil Rel % 49.6 54.7 50.7   Immature Granulocyte Rel % 0.2 0.2 0.0   Lymphocyte Rel % 35.9 31.4 36.7   Monocyte Rel % 11.1 10.3 8.9   Eosinophil Rel % 1.9 2.2 2.5   Basophil Rel % 1.3 1.2 1.2   (A) Abnormal value             Lipid Panel    Lipid Panel 12/30/21 4/1/22 7/8/22   Total Cholesterol 173 138 137   Triglycerides 144 122 134   HDL Cholesterol 47 41 41   VLDL Cholesterol 25 22 24   LDL Cholesterol  101 (A) 75 72   LDL/HDL Ratio 2.07 1.77 1.69   (A) Abnormal value             Lab Results   Component Value Date    TSH 4.960 (H) 07/08/2022    TSH 3.600 04/01/2022    TSH 6.160 (H) 12/30/2021      Lab Results   Component Value Date    FREET4 1.15 07/08/2022    FREET4 1.23 04/01/2022    FREET4 1.11 12/30/2021      A1C Last 3 Results    HGBA1C Last 3 Results 12/30/21 4/1/22 7/8/22   Hemoglobin A1C 6.06 (A) 5.90 (A) 6.00 (A)   (A) Abnormal value             Lab Results   Component Value Date    IRIOGLPW86 469 07/08/2022    PTJL82NZ 33.8 07/08/2022    MG 1.9  07/15/2021     PSA    PSA 4/1/22   PSA 7.240 (A)   (A) Abnormal value             No Images in the past 120 days found..             ASSESSMENT & PLAN  Diagnoses and all orders for this visit:    1. Coronary artery disease involving native coronary artery of native heart without angina pectoris (Primary)  Assessment & Plan:  Assessment CAD with stent drug-eluting.  2005.  Stable.  Plan: Patient follows with cardiologist every 6 to 12 months in [place].  Continue Toprol-XL 25 mg daily, rosuvastatin 40 mg daily.  Try to increase his exercise including treadmill at home.        2. Pure hypercholesterolemia  Comments:  Stable LDL less than 70 continue rosuvastatin 40 mg daily no myalgias.  Orders:  -     Lipid panel; Future    3. Elevated PSA  Assessment & Plan:  Patient follows with Dr. Rubio for elevated PSA.  He saw him in December.  Reschedule him for December 2022.  Abnormal PSA.  Has had full evaluation.          4. Type 2 diabetes mellitus with hyperglycemia, without long-term current use of insulin (HCC)  Assessment & Plan:  A1c equals 6 patient not on any medications discussed lifestyle changes low-carb diet.  Patient is active-goes hunting frequently for [employer] and deer and 2 [employer] activities and this outside frequently.    Orders:  -     Hemoglobin A1c; Future    5. Subclinical hypothyroidism  Assessment & Plan:  Had subclinical hypothyroidism with mildly elevated TSH up to 6-7 range.in past  Synthroid was started at 25 mcg a few  Visits ago  by Dr Abbott.  He looks more energetic and more like his old self again.  TSH is slightly elevated this time  Assessment: Hypothyroid in fair range.  Plan: Continue Synthroid 25 mcg daily.  TFTs on return the office.       Orders:  -     TSH+Free T4; Future    6. Vitamin D deficiency  Comments:  take vitamin D 2000 u daily-5 d/week-especially in winter  Orders:  -     Vitamin D 25 hydroxy; Future    7. Primary hypertension  Comments:  Continue with metoprolol XL 25  mg daily  Orders:  -     Comprehensive metabolic panel; Future  -     TSH+Free T4; Future    8. Encounter for long-term (current) use of other medications  -     CBC w AUTO Differential; Future    9. B12 deficiency  -     Vitamin B12; Future  -     Folate; Future        FOLLOW UP  Return in about 3 months (around 11/3/2022).  With labs prior  Patient is up-to-date with his vaccines no need for Prevnar 20 or Tdap or Shingrix vaccines.    Patient was given instructions and counseling regarding his condition or for health maintenance advice. Please see specific information pulled into the AVS if appropriate.

## 2022-08-04 ENCOUNTER — OFFICE VISIT (OUTPATIENT)
Dept: CARDIAC REHAB | Facility: HOSPITAL | Age: 72
End: 2022-08-04

## 2022-08-04 VITALS
BODY MASS INDEX: 28.1 KG/M2 | DIASTOLIC BLOOD PRESSURE: 80 MMHG | WEIGHT: 184.75 LBS | SYSTOLIC BLOOD PRESSURE: 122 MMHG | HEART RATE: 62 BPM | OXYGEN SATURATION: 94 %

## 2022-08-04 DIAGNOSIS — T82.855D STENOSIS OF CORONARY ARTERY STENT, SUBSEQUENT ENCOUNTER: Primary | ICD-10-CM

## 2022-08-09 ENCOUNTER — OFFICE VISIT (OUTPATIENT)
Dept: CARDIAC REHAB | Facility: HOSPITAL | Age: 72
End: 2022-08-09

## 2022-08-09 VITALS
DIASTOLIC BLOOD PRESSURE: 82 MMHG | HEART RATE: 62 BPM | OXYGEN SATURATION: 96 % | WEIGHT: 187.39 LBS | SYSTOLIC BLOOD PRESSURE: 118 MMHG | BODY MASS INDEX: 28.5 KG/M2

## 2022-08-09 DIAGNOSIS — T82.855D STENOSIS OF CORONARY ARTERY STENT, SUBSEQUENT ENCOUNTER: Primary | ICD-10-CM

## 2022-08-11 ENCOUNTER — OFFICE VISIT (OUTPATIENT)
Dept: CARDIAC REHAB | Facility: HOSPITAL | Age: 72
End: 2022-08-11

## 2022-08-11 VITALS
WEIGHT: 186.29 LBS | HEART RATE: 60 BPM | DIASTOLIC BLOOD PRESSURE: 60 MMHG | SYSTOLIC BLOOD PRESSURE: 116 MMHG | BODY MASS INDEX: 28.33 KG/M2

## 2022-08-11 DIAGNOSIS — T82.855D STENOSIS OF CORONARY ARTERY STENT, SUBSEQUENT ENCOUNTER: Primary | ICD-10-CM

## 2022-08-16 ENCOUNTER — OFFICE VISIT (OUTPATIENT)
Dept: CARDIAC REHAB | Facility: HOSPITAL | Age: 72
End: 2022-08-16

## 2022-08-16 VITALS
SYSTOLIC BLOOD PRESSURE: 118 MMHG | WEIGHT: 186.51 LBS | HEART RATE: 58 BPM | BODY MASS INDEX: 28.37 KG/M2 | DIASTOLIC BLOOD PRESSURE: 74 MMHG | OXYGEN SATURATION: 98 %

## 2022-08-16 DIAGNOSIS — T82.855D STENOSIS OF CORONARY ARTERY STENT, SUBSEQUENT ENCOUNTER: Primary | ICD-10-CM

## 2022-08-18 ENCOUNTER — OFFICE VISIT (OUTPATIENT)
Dept: CARDIAC REHAB | Facility: HOSPITAL | Age: 72
End: 2022-08-18

## 2022-08-18 ENCOUNTER — APPOINTMENT (OUTPATIENT)
Dept: CARDIAC REHAB | Facility: HOSPITAL | Age: 72
End: 2022-08-18

## 2022-08-18 VITALS
BODY MASS INDEX: 28.33 KG/M2 | DIASTOLIC BLOOD PRESSURE: 68 MMHG | HEART RATE: 59 BPM | WEIGHT: 186.29 LBS | OXYGEN SATURATION: 96 % | SYSTOLIC BLOOD PRESSURE: 140 MMHG

## 2022-08-18 DIAGNOSIS — T82.855D STENOSIS OF CORONARY ARTERY STENT, SUBSEQUENT ENCOUNTER: Primary | ICD-10-CM

## 2022-08-23 ENCOUNTER — OFFICE VISIT (OUTPATIENT)
Dept: CARDIAC REHAB | Facility: HOSPITAL | Age: 72
End: 2022-08-23

## 2022-08-23 VITALS
WEIGHT: 187.83 LBS | BODY MASS INDEX: 28.57 KG/M2 | HEART RATE: 64 BPM | DIASTOLIC BLOOD PRESSURE: 82 MMHG | SYSTOLIC BLOOD PRESSURE: 128 MMHG | OXYGEN SATURATION: 95 %

## 2022-08-23 DIAGNOSIS — T82.855D STENOSIS OF CORONARY ARTERY STENT, SUBSEQUENT ENCOUNTER: Primary | ICD-10-CM

## 2022-08-25 ENCOUNTER — OFFICE VISIT (OUTPATIENT)
Dept: CARDIAC REHAB | Facility: HOSPITAL | Age: 72
End: 2022-08-25

## 2022-08-25 VITALS
WEIGHT: 186.07 LBS | OXYGEN SATURATION: 99 % | DIASTOLIC BLOOD PRESSURE: 74 MMHG | HEART RATE: 72 BPM | SYSTOLIC BLOOD PRESSURE: 128 MMHG | BODY MASS INDEX: 28.3 KG/M2

## 2022-08-25 DIAGNOSIS — T82.855D STENOSIS OF CORONARY ARTERY STENT, SUBSEQUENT ENCOUNTER: Primary | ICD-10-CM

## 2022-08-30 RX ORDER — METOPROLOL SUCCINATE 25 MG/1
12.5 TABLET, EXTENDED RELEASE ORAL DAILY
Qty: 45 TABLET | Refills: 1 | Status: SHIPPED | OUTPATIENT
Start: 2022-08-30 | End: 2022-09-22 | Stop reason: SDUPTHER

## 2022-09-01 ENCOUNTER — OFFICE VISIT (OUTPATIENT)
Dept: CARDIAC REHAB | Facility: HOSPITAL | Age: 72
End: 2022-09-01

## 2022-09-01 VITALS
WEIGHT: 186.07 LBS | HEART RATE: 51 BPM | BODY MASS INDEX: 28.3 KG/M2 | OXYGEN SATURATION: 98 % | DIASTOLIC BLOOD PRESSURE: 70 MMHG | SYSTOLIC BLOOD PRESSURE: 122 MMHG

## 2022-09-01 DIAGNOSIS — T82.855D STENOSIS OF CORONARY ARTERY STENT, SUBSEQUENT ENCOUNTER: Primary | ICD-10-CM

## 2022-09-06 ENCOUNTER — OFFICE VISIT (OUTPATIENT)
Dept: CARDIAC REHAB | Facility: HOSPITAL | Age: 72
End: 2022-09-06

## 2022-09-06 VITALS
SYSTOLIC BLOOD PRESSURE: 128 MMHG | DIASTOLIC BLOOD PRESSURE: 80 MMHG | HEART RATE: 61 BPM | OXYGEN SATURATION: 97 % | BODY MASS INDEX: 28.84 KG/M2 | WEIGHT: 189.6 LBS

## 2022-09-06 DIAGNOSIS — T82.855D STENOSIS OF CORONARY ARTERY STENT, SUBSEQUENT ENCOUNTER: Primary | ICD-10-CM

## 2022-09-08 ENCOUNTER — OFFICE VISIT (OUTPATIENT)
Dept: CARDIAC REHAB | Facility: HOSPITAL | Age: 72
End: 2022-09-08

## 2022-09-08 VITALS
HEART RATE: 54 BPM | OXYGEN SATURATION: 96 % | BODY MASS INDEX: 28.4 KG/M2 | DIASTOLIC BLOOD PRESSURE: 70 MMHG | WEIGHT: 186.73 LBS | SYSTOLIC BLOOD PRESSURE: 128 MMHG

## 2022-09-08 DIAGNOSIS — T82.855D STENOSIS OF CORONARY ARTERY STENT, SUBSEQUENT ENCOUNTER: Primary | ICD-10-CM

## 2022-09-13 ENCOUNTER — OFFICE VISIT (OUTPATIENT)
Dept: CARDIAC REHAB | Facility: HOSPITAL | Age: 72
End: 2022-09-13

## 2022-09-13 VITALS
OXYGEN SATURATION: 98 % | BODY MASS INDEX: 28.33 KG/M2 | DIASTOLIC BLOOD PRESSURE: 80 MMHG | HEART RATE: 60 BPM | SYSTOLIC BLOOD PRESSURE: 120 MMHG | WEIGHT: 186.29 LBS

## 2022-09-13 DIAGNOSIS — T82.855D STENOSIS OF CORONARY ARTERY STENT, SUBSEQUENT ENCOUNTER: Primary | ICD-10-CM

## 2022-09-15 ENCOUNTER — OFFICE VISIT (OUTPATIENT)
Dept: CARDIAC REHAB | Facility: HOSPITAL | Age: 72
End: 2022-09-15

## 2022-09-15 VITALS
SYSTOLIC BLOOD PRESSURE: 130 MMHG | WEIGHT: 185.85 LBS | BODY MASS INDEX: 28.27 KG/M2 | DIASTOLIC BLOOD PRESSURE: 70 MMHG | HEART RATE: 60 BPM | OXYGEN SATURATION: 97 %

## 2022-09-15 DIAGNOSIS — T82.855D STENOSIS OF CORONARY ARTERY STENT, SUBSEQUENT ENCOUNTER: Primary | ICD-10-CM

## 2022-09-20 ENCOUNTER — OFFICE VISIT (OUTPATIENT)
Dept: CARDIAC REHAB | Facility: HOSPITAL | Age: 72
End: 2022-09-20

## 2022-09-20 VITALS
HEART RATE: 66 BPM | WEIGHT: 187.61 LBS | SYSTOLIC BLOOD PRESSURE: 124 MMHG | BODY MASS INDEX: 28.53 KG/M2 | OXYGEN SATURATION: 96 % | DIASTOLIC BLOOD PRESSURE: 82 MMHG

## 2022-09-20 DIAGNOSIS — T82.855D STENOSIS OF CORONARY ARTERY STENT, SUBSEQUENT ENCOUNTER: Primary | ICD-10-CM

## 2022-09-22 ENCOUNTER — OFFICE VISIT (OUTPATIENT)
Dept: CARDIAC REHAB | Facility: HOSPITAL | Age: 72
End: 2022-09-22

## 2022-09-22 ENCOUNTER — TELEPHONE (OUTPATIENT)
Dept: INTERNAL MEDICINE | Facility: CLINIC | Age: 72
End: 2022-09-22

## 2022-09-22 VITALS
BODY MASS INDEX: 28.2 KG/M2 | WEIGHT: 185.41 LBS | DIASTOLIC BLOOD PRESSURE: 60 MMHG | OXYGEN SATURATION: 95 % | SYSTOLIC BLOOD PRESSURE: 122 MMHG | HEART RATE: 60 BPM

## 2022-09-22 DIAGNOSIS — T82.855D STENOSIS OF CORONARY ARTERY STENT, SUBSEQUENT ENCOUNTER: Primary | ICD-10-CM

## 2022-09-22 RX ORDER — METOPROLOL SUCCINATE 25 MG/1
12.5 TABLET, EXTENDED RELEASE ORAL DAILY
Qty: 45 TABLET | Refills: 1 | Status: SHIPPED | OUTPATIENT
Start: 2022-09-22

## 2022-09-27 ENCOUNTER — OFFICE VISIT (OUTPATIENT)
Dept: CARDIAC REHAB | Facility: HOSPITAL | Age: 72
End: 2022-09-27

## 2022-09-27 VITALS
HEART RATE: 67 BPM | DIASTOLIC BLOOD PRESSURE: 70 MMHG | SYSTOLIC BLOOD PRESSURE: 104 MMHG | BODY MASS INDEX: 28.37 KG/M2 | OXYGEN SATURATION: 97 % | WEIGHT: 186.51 LBS

## 2022-09-27 DIAGNOSIS — T82.855D STENOSIS OF CORONARY ARTERY STENT, SUBSEQUENT ENCOUNTER: Primary | ICD-10-CM

## 2022-10-06 ENCOUNTER — OFFICE VISIT (OUTPATIENT)
Dept: CARDIAC REHAB | Facility: HOSPITAL | Age: 72
End: 2022-10-06

## 2022-10-06 VITALS — OXYGEN SATURATION: 97 % | DIASTOLIC BLOOD PRESSURE: 78 MMHG | SYSTOLIC BLOOD PRESSURE: 121 MMHG | HEART RATE: 66 BPM

## 2022-10-06 DIAGNOSIS — T82.855D STENOSIS OF CORONARY ARTERY STENT, SUBSEQUENT ENCOUNTER: Primary | ICD-10-CM

## 2022-10-11 ENCOUNTER — OFFICE VISIT (OUTPATIENT)
Dept: CARDIAC REHAB | Facility: HOSPITAL | Age: 72
End: 2022-10-11

## 2022-10-11 VITALS
DIASTOLIC BLOOD PRESSURE: 80 MMHG | WEIGHT: 189.38 LBS | BODY MASS INDEX: 28.8 KG/M2 | HEART RATE: 57 BPM | OXYGEN SATURATION: 97 % | SYSTOLIC BLOOD PRESSURE: 112 MMHG

## 2022-10-11 DIAGNOSIS — T82.855D STENOSIS OF CORONARY ARTERY STENT, SUBSEQUENT ENCOUNTER: Primary | ICD-10-CM

## 2022-10-13 ENCOUNTER — APPOINTMENT (OUTPATIENT)
Dept: CARDIAC REHAB | Facility: HOSPITAL | Age: 72
End: 2022-10-13

## 2022-10-18 ENCOUNTER — APPOINTMENT (OUTPATIENT)
Dept: CARDIAC REHAB | Facility: HOSPITAL | Age: 72
End: 2022-10-18

## 2022-10-18 ENCOUNTER — TELEPHONE (OUTPATIENT)
Dept: INTERNAL MEDICINE | Facility: CLINIC | Age: 72
End: 2022-10-18

## 2022-10-18 NOTE — TELEPHONE ENCOUNTER
Pt due for repeat cscope-Dr Kailash Orellana's office unable to reach him-last one 2/2020 and recommended repeat at this time. Will ask if can send referral.

## 2022-10-19 ENCOUNTER — TELEPHONE (OUTPATIENT)
Dept: INTERNAL MEDICINE | Facility: CLINIC | Age: 72
End: 2022-10-19

## 2022-10-19 DIAGNOSIS — Z12.11 ENCOUNTER FOR SCREENING COLONOSCOPY: Primary | ICD-10-CM

## 2022-10-25 ENCOUNTER — OFFICE VISIT (OUTPATIENT)
Dept: CARDIAC REHAB | Facility: HOSPITAL | Age: 72
End: 2022-10-25

## 2022-10-25 VITALS
OXYGEN SATURATION: 96 % | WEIGHT: 184.3 LBS | HEART RATE: 62 BPM | DIASTOLIC BLOOD PRESSURE: 70 MMHG | SYSTOLIC BLOOD PRESSURE: 120 MMHG | BODY MASS INDEX: 28.03 KG/M2

## 2022-10-25 DIAGNOSIS — T82.855D STENOSIS OF CORONARY ARTERY STENT, SUBSEQUENT ENCOUNTER: Primary | ICD-10-CM

## 2022-10-27 ENCOUNTER — OFFICE VISIT (OUTPATIENT)
Dept: CARDIAC REHAB | Facility: HOSPITAL | Age: 72
End: 2022-10-27

## 2022-10-27 VITALS
WEIGHT: 185.63 LBS | BODY MASS INDEX: 28.23 KG/M2 | OXYGEN SATURATION: 98 % | DIASTOLIC BLOOD PRESSURE: 60 MMHG | HEART RATE: 64 BPM | SYSTOLIC BLOOD PRESSURE: 120 MMHG

## 2022-10-27 DIAGNOSIS — T82.855D STENOSIS OF CORONARY ARTERY STENT, SUBSEQUENT ENCOUNTER: Primary | ICD-10-CM

## 2022-10-31 ENCOUNTER — TELEPHONE (OUTPATIENT)
Dept: UROLOGY | Facility: CLINIC | Age: 72
End: 2022-10-31

## 2022-10-31 NOTE — TELEPHONE ENCOUNTER
Caller: Yann Pardo    Relationship to patient: Self    Best call back number: [phone] - OK TO LEAVE MESSAGE    Patient is needing: PT MRI WAS MOVED TO 11/23/2022. HE NEEDS A FOLLOW UP APPOINTMENT. I DO NOT HAVE ANYTHING AVAILABLE UNTIL THE END OF January. PLEASE CALL TO PT. THANK YOU.

## 2022-11-01 ENCOUNTER — OFFICE VISIT (OUTPATIENT)
Dept: CARDIAC REHAB | Facility: HOSPITAL | Age: 72
End: 2022-11-01

## 2022-11-01 VITALS
WEIGHT: 185.41 LBS | HEART RATE: 58 BPM | DIASTOLIC BLOOD PRESSURE: 76 MMHG | SYSTOLIC BLOOD PRESSURE: 122 MMHG | OXYGEN SATURATION: 98 % | BODY MASS INDEX: 28.2 KG/M2

## 2022-11-01 DIAGNOSIS — T82.855D STENOSIS OF CORONARY ARTERY STENT, SUBSEQUENT ENCOUNTER: Primary | ICD-10-CM

## 2022-11-01 PROBLEM — E11.65 TYPE 2 DIABETES MELLITUS WITH HYPERGLYCEMIA, WITHOUT LONG-TERM CURRENT USE OF INSULIN: Status: ACTIVE | Noted: 2022-08-02

## 2022-11-02 ENCOUNTER — OFFICE VISIT (OUTPATIENT)
Dept: INTERNAL MEDICINE | Facility: CLINIC | Age: 72
End: 2022-11-02

## 2022-11-02 VITALS
HEART RATE: 82 BPM | RESPIRATION RATE: 18 BRPM | OXYGEN SATURATION: 98 % | WEIGHT: 187.6 LBS | HEIGHT: 68 IN | BODY MASS INDEX: 28.43 KG/M2 | DIASTOLIC BLOOD PRESSURE: 82 MMHG | SYSTOLIC BLOOD PRESSURE: 136 MMHG | TEMPERATURE: 97.6 F

## 2022-11-02 DIAGNOSIS — R97.20 ELEVATED PSA: ICD-10-CM

## 2022-11-02 DIAGNOSIS — J01.01 ACUTE RECURRENT MAXILLARY SINUSITIS: Primary | ICD-10-CM

## 2022-11-02 DIAGNOSIS — R73.03 PREDIABETES: ICD-10-CM

## 2022-11-02 DIAGNOSIS — I25.10 CORONARY ARTERY DISEASE INVOLVING NATIVE CORONARY ARTERY OF NATIVE HEART WITHOUT ANGINA PECTORIS: ICD-10-CM

## 2022-11-02 DIAGNOSIS — E78.00 HIGH CHOLESTEROL: ICD-10-CM

## 2022-11-02 DIAGNOSIS — E03.8 SUBCLINICAL HYPOTHYROIDISM: ICD-10-CM

## 2022-11-02 DIAGNOSIS — I10 PRIMARY HYPERTENSION: ICD-10-CM

## 2022-11-02 PROCEDURE — 99214 OFFICE O/P EST MOD 30 MIN: CPT | Performed by: INTERNAL MEDICINE

## 2022-11-02 RX ORDER — CEFUROXIME AXETIL 500 MG/1
500 TABLET ORAL 2 TIMES DAILY
Qty: 20 TABLET | Refills: 0 | Status: SHIPPED | OUTPATIENT
Start: 2022-11-02 | End: 2022-12-28 | Stop reason: SDUPTHER

## 2022-11-02 NOTE — PROGRESS NOTES
CHIEF COMPLAINT  Yann Pardo presents to [facility] for follow-up of Follow-up (72 year old male here today for a 3 month follow up. States he did not get his labs done but will get them next week. /He complains of a sinus infection X 2 weeks. States he gets it every year- states he would like to get Rx for the meds that Dr Abbott would give him as it always helps. ).    HPI  72-year-old male last seen by me August 2022 here for checkup    C/o sinus pressure x 2 weeks    Past medical history significant for-CAD status post stent in 2005.  Resuming cardiac rehab.  Went 15 years prior but was unable to continue due to COVID pandemic.  Hyperlipidemia well-controlled on statin.  Subclinical hypothyroidism starting Synthroid low-dose.  Elevated PSA and being followed by Dr. Rubio.  History of rib fracture in the distant past after a fall.    Current Outpatient Medications:   •  aspirin 81 MG EC tablet, Take 81 mg by mouth Daily., Disp: , Rfl:   •  levothyroxine (Synthroid) 25 MCG tablet, Take 1 tablet by mouth Daily., Disp: 90 tablet, Rfl: 3  •  metoprolol succinate XL (TOPROL-XL) 25 MG 24 hr tablet, Take 0.5 tablets by mouth Daily., Disp: 45 tablet, Rfl: 1  •  montelukast (Singulair) 10 MG tablet, Take 1 tablet by mouth Every Night., Disp: 90 tablet, Rfl: 3  •  nitroglycerin (NITROLINGUAL) 0.4 MG/SPRAY spray, Place 1 spray under the tongue Every 5 (Five) Minutes As Needed for Chest Pain., Disp: 1 each, Rfl: 1  •  rosuvastatin (CRESTOR) 40 MG tablet, TAKE 1 TABLET BY MOUTH EVERY NIGHT AT BEDTIME, Disp: 90 tablet, Rfl: 1  •  cefuroxime (CEFTIN) 500 MG tablet, Take 1 tablet by mouth 2 (Two) Times a Day., Disp: 20 tablet, Rfl: 0   PFSH reviewed.  ROS negative    OBJECTIVE  Vital Signs  Vitals:    11/02/22 1541   BP: 136/82   BP Location: Left arm   Patient Position: Sitting   Pulse: 82   Resp: 18   Temp: 97.6 °F (36.4 °C)   TempSrc: Temporal   SpO2: 98%   Weight: 85.1 kg (187 lb 9.6  "oz)   Height: 172.7 cm (67.99\")      Body mass index is 28.53 kg/m².    Physical Exam  Vitals and nursing note reviewed.   Constitutional:       Appearance: Normal appearance.   HENT:      Head: Normocephalic and atraumatic.      Ears:      Comments: Sl dull TM     Nose: Rhinorrhea present.   Eyes:      Extraocular Movements: Extraocular movements intact.      Pupils: Pupils are equal, round, and reactive to light.   Cardiovascular:      Rate and Rhythm: Normal rate and regular rhythm.   Pulmonary:      Effort: Pulmonary effort is normal.      Breath sounds: Normal breath sounds.   Abdominal:      General: Abdomen is flat.      Palpations: Abdomen is soft.   Musculoskeletal:      Cervical back: Normal range of motion and neck supple.   Skin:     General: Skin is warm and dry.   Neurological:      General: No focal deficit present.      Mental Status: He is alert and oriented to person, place, and time.   Psychiatric:         Mood and Affect: Mood normal.         Behavior: Behavior normal.          RESULTS REVIEW  No results found for: PROBNP, BNP  CMP    CMP 12/30/21 4/1/22 7/8/22   Glucose 107 (A) 105 (A) 110 (A)   BUN 17 16 17   Creatinine 1.29 (A) 1.31 (A) 1.25   eGFR Non African Am 55 (A)     Sodium 142 141 138   Potassium 4.7 4.4 4.3   Chloride 108 (A) 105 105   Calcium 9.2 9.3 8.8   Albumin 4.70 4.50 4.40   Total Bilirubin 0.3 0.4 0.3   Alkaline Phosphatase 65 60 62   AST (SGOT) 40 25 27   ALT (SGPT) 47 (A) 44 (A) 32   (A) Abnormal value            CBC w/diff    CBC w/Diff 12/30/21 4/1/22 7/8/22   WBC 4.68 4.97 4.82   RBC 5.08 4.97 5.04   Hemoglobin 15.4 15.2 15.5   Hematocrit 45.7 45.3 44.1   MCV 90.0 91.1 87.5   MCH 30.3 30.6 30.8   MCHC 33.7 33.6 35.1   RDW 12.1 (A) 12.2 (A) 12.1 (A)   Platelets 176 166 164   Neutrophil Rel % 49.6 54.7 50.7   Immature Granulocyte Rel % 0.2 0.2 0.0   Lymphocyte Rel % 35.9 31.4 36.7   Monocyte Rel % 11.1 10.3 8.9   Eosinophil Rel % 1.9 2.2 2.5   Basophil Rel % 1.3 1.2 1.2 "   (A) Abnormal value             Lipid Panel    Lipid Panel 12/30/21 4/1/22 7/8/22   Total Cholesterol 173 138 137   Triglycerides 144 122 134   HDL Cholesterol 47 41 41   VLDL Cholesterol 25 22 24   LDL Cholesterol  101 (A) 75 72   LDL/HDL Ratio 2.07 1.77 1.69   (A) Abnormal value             Lab Results   Component Value Date    TSH 4.960 (H) 07/08/2022    TSH 3.600 04/01/2022    TSH 6.160 (H) 12/30/2021      Lab Results   Component Value Date    FREET4 1.15 07/08/2022    FREET4 1.23 04/01/2022    FREET4 1.11 12/30/2021      A1C Last 3 Results    HGBA1C Last 3 Results 12/30/21 4/1/22 7/8/22   Hemoglobin A1C 6.06 (A) 5.90 (A) 6.00 (A)   (A) Abnormal value             Lab Results   Component Value Date    [id number] 469 07/08/2022    [id number] 33.8 07/08/2022    MG 1.9 07/15/2021     PSA    PSA 4/1/22   PSA 7.240 (A)   (A) Abnormal value             No Images in the past 120 days found..             ASSESSMENT & PLAN  Diagnoses and all orders for this visit:    1. Acute recurrent maxillary sinusitis (Primary)  -     cefuroxime (CEFTIN) 500 MG tablet; Take 1 tablet by mouth 2 (Two) Times a Day.  Dispense: 20 tablet; Refill: 0    2. Coronary artery disease involving native coronary artery of native heart without angina pectoris  Assessment & Plan:  Assessment CAD with stent drug-eluting.  2005.  Stable.  Plan: Patient follows with cardiologist every 6 to 12 months in [place].  Continue Toprol-XL 25 mg daily, rosuvastatin 40 mg daily.  Try to increase his exercise including treadmill at home.      Orders:  -     Comprehensive Metabolic Panel; Future  -     Lipid Panel; Future    3. Prediabetes  Comments:  Follow a low-carb diet continue lifestyle modifications.  Assessment & Plan:  A1c equals 6 patient not on any medications discussed lifestyle changes low-carb diet.  Patient is active-goes hunting frequently for elk and deer and 2 [employer] activities and this outside frequently.    Orders:  -     Hemoglobin A1c;  Future    4. High cholesterol  Assessment & Plan:  Lipid panel well controlled.  Plan: Continue Crestor 40 mg at bedtime.  Repeat lipid panel return the office.    Orders:  -     Comprehensive Metabolic Panel; Future  -     Lipid Panel; Future    5. Subclinical hypothyroidism  Comments:  No constipation weight is stable for the past year  Orders:  -     TSH+Free T4; Future  -     T3, Free; Future    6. Elevated PSA  Assessment & Plan:  Patient follows with Dr. Rubio for elevated PSA.  He saw him in December.  Reschedule him for December 2022.  Abnormal PSA.  Has had full evaluation.          7. Primary hypertension  Assessment & Plan:  Stable blood pressure 136/82 today continue with metoprolol.    Orders:  -     Comprehensive Metabolic Panel; Future  -     Lipid Panel; Future  -     CBC & Differential; Future  -     Vitamin B12; Future  -     Folate; Future  -     Vitamin D,25-Hydroxy; Future  -     Magnesium; Future  -     Microalbumin / Creatinine Urine Ratio - Urine, Clean Catch; Future  -     TSH+Free T4; Future  -     T3, Free; Future        FOLLOW UP  Return in about 3 months (around 2/2/2023) for Recheck.    Patient was given instructions and counseling regarding his condition or for health maintenance advice. Please see specific information pulled into the AVS if appropriate.           Answers for HPI/ROS submitted by the patient on 11/1/2022  What is the primary reason for your visit?: Other  Please describe your symptoms.: check up  Have you had these symptoms before?: No  How long have you been having these symptoms?: 1-4 days  Please list any medications you are currently taking for this condition.: nonen/a

## 2022-11-02 NOTE — ASSESSMENT & PLAN NOTE
A1c equals 6 patient not on any medications discussed lifestyle changes low-carb diet.  Patient is active-goes hunting frequently for elk and deer and 2 [employer] activities and this outside frequently.

## 2022-11-02 NOTE — ASSESSMENT & PLAN NOTE
Assessment CAD with stent drug-eluting.  2005.  Stable.  Plan: Patient follows with cardiologist every 6 to 12 months in [place].  Continue Toprol-XL 25 mg daily, rosuvastatin 40 mg daily.  Try to increase his exercise including treadmill at home.

## 2022-11-03 ENCOUNTER — OFFICE VISIT (OUTPATIENT)
Dept: CARDIAC REHAB | Facility: HOSPITAL | Age: 72
End: 2022-11-03

## 2022-11-03 VITALS
HEART RATE: 63 BPM | DIASTOLIC BLOOD PRESSURE: 80 MMHG | BODY MASS INDEX: 28.03 KG/M2 | SYSTOLIC BLOOD PRESSURE: 122 MMHG | OXYGEN SATURATION: 96 % | WEIGHT: 184.3 LBS

## 2022-11-03 DIAGNOSIS — T82.855D STENOSIS OF CORONARY ARTERY STENT, SUBSEQUENT ENCOUNTER: Primary | ICD-10-CM

## 2022-11-08 ENCOUNTER — OFFICE VISIT (OUTPATIENT)
Dept: CARDIAC REHAB | Facility: HOSPITAL | Age: 72
End: 2022-11-08

## 2022-11-08 DIAGNOSIS — T82.855D STENOSIS OF CORONARY ARTERY STENT, SUBSEQUENT ENCOUNTER: Primary | ICD-10-CM

## 2022-11-10 ENCOUNTER — OFFICE VISIT (OUTPATIENT)
Dept: CARDIAC REHAB | Facility: HOSPITAL | Age: 72
End: 2022-11-10

## 2022-11-10 VITALS
HEART RATE: 63 BPM | OXYGEN SATURATION: 96 % | SYSTOLIC BLOOD PRESSURE: 120 MMHG | BODY MASS INDEX: 28 KG/M2 | WEIGHT: 184.08 LBS | DIASTOLIC BLOOD PRESSURE: 70 MMHG

## 2022-11-10 DIAGNOSIS — T82.855D STENOSIS OF CORONARY ARTERY STENT, SUBSEQUENT ENCOUNTER: Primary | ICD-10-CM

## 2022-11-17 ENCOUNTER — OFFICE VISIT (OUTPATIENT)
Dept: CARDIAC REHAB | Facility: HOSPITAL | Age: 72
End: 2022-11-17

## 2022-11-17 VITALS
DIASTOLIC BLOOD PRESSURE: 68 MMHG | BODY MASS INDEX: 27.7 KG/M2 | HEART RATE: 62 BPM | SYSTOLIC BLOOD PRESSURE: 120 MMHG | OXYGEN SATURATION: 100 % | WEIGHT: 182.1 LBS

## 2022-11-17 DIAGNOSIS — T82.855D STENOSIS OF CORONARY ARTERY STENT, SUBSEQUENT ENCOUNTER: Primary | ICD-10-CM

## 2022-11-22 ENCOUNTER — TELEPHONE (OUTPATIENT)
Dept: SURGERY | Facility: CLINIC | Age: 72
End: 2022-11-22

## 2022-11-22 ENCOUNTER — LAB (OUTPATIENT)
Dept: LAB | Facility: HOSPITAL | Age: 72
End: 2022-11-22

## 2022-11-22 ENCOUNTER — OFFICE VISIT (OUTPATIENT)
Dept: CARDIAC REHAB | Facility: HOSPITAL | Age: 72
End: 2022-11-22

## 2022-11-22 VITALS
WEIGHT: 184.53 LBS | OXYGEN SATURATION: 98 % | HEART RATE: 55 BPM | BODY MASS INDEX: 28.07 KG/M2 | SYSTOLIC BLOOD PRESSURE: 140 MMHG | DIASTOLIC BLOOD PRESSURE: 80 MMHG

## 2022-11-22 DIAGNOSIS — R97.20 ELEVATED PSA: Primary | ICD-10-CM

## 2022-11-22 DIAGNOSIS — R97.20 ELEVATED PROSTATE SPECIFIC ANTIGEN (PSA): Primary | ICD-10-CM

## 2022-11-22 DIAGNOSIS — R97.20 ELEVATED PROSTATE SPECIFIC ANTIGEN (PSA): ICD-10-CM

## 2022-11-22 DIAGNOSIS — T82.855D STENOSIS OF CORONARY ARTERY STENT, SUBSEQUENT ENCOUNTER: Primary | ICD-10-CM

## 2022-11-22 LAB — PSA SERPL-MCNC: 7.37 NG/ML (ref 0–4)

## 2022-11-22 PROCEDURE — 36415 COLL VENOUS BLD VENIPUNCTURE: CPT

## 2022-11-22 PROCEDURE — 84153 ASSAY OF PSA TOTAL: CPT

## 2022-11-24 ENCOUNTER — APPOINTMENT (OUTPATIENT)
Dept: CARDIAC REHAB | Facility: HOSPITAL | Age: 72
End: 2022-11-24

## 2022-11-26 NOTE — PROGRESS NOTES
Chief Complaint    Urologic complaint    Subjective          Yann Pardo presents to [facility] UR[facility]  History of Present Illness       72 year old  gentleman     Elevated PSA      Voiding ok.  No change  Doing well.     No GH.      No prostate meds      PVR        No urologic family history.  No family history of prostate cancer.    Mother  at 92 and father  at 86.    s/p cardiac stent.  Patient does not smoke. Baby aspirin daily.        PSA       2022 MRI prostate-60 g  PI-RADS 4-right peripheral zone 10 x 4 mm.  Stable from prior exam.  No extraprostatic extension.  Posterior marginal abutment.       7.3       7.2       11/15/2021 MRI prostate 65 g-PI-RADS 4 lesion right posterior lateral peripheral zone.  Stable in size.  11 mm      6.58    10/28/2020 MRI fusion prostate biopsy - right base focal high-grade PIN, negative otherwise    10/20 prostate MRI  49 g - lesion in the right posterior mid to base peripheral zone PIRADS 4 - 11 mm .  More indeterminate lesion in the anterior mid left peripheral zone 16mm, PIRADS 3      9.51  3/20 prostate biopsy - 49 g -  neg     6.89       5.4, percent free 20% (23%)     6.09  11/10  1.8  10/09  1.6      1.7            Past History:  Medical History: has a past medical history of Allergic, Asthma, Bronchospasm, Bronchospasm, CAD (coronary artery disease), Colon polyp, Diverticulosis, Elevated PSA, GERD (gastroesophageal reflux disease), Heart disease, Hemorrhoids, Hyperglycemia, Hyperlipidemia, Hypertension, Lung collapse, Mitral regurgitation, Rib fracture, Sinus bradycardia, Sinus bradycardia, Sinusitis, and Tubular adenoma.   Surgical History: has a past surgical history that includes Coronary stent placement; Colonoscopy; Colonoscopy; Coronary stent placement; Hernia repair; Portacath placement; and Prostate biopsy.   Family History: family history includes Coronary artery disease  in an other family member; Kidney cancer in his father.   Social History: reports that he quit smoking about 49 years ago. His smoking use included cigarettes. He has a 2.00 pack-year smoking history. He quit smokeless tobacco use about 37 years ago.  His smokeless tobacco use included chew. He reports current alcohol use of about 6.0 standard drinks per week. He reports that he does not use drugs.  Allergies: Patient has no known allergies.       Current Outpatient Medications:   •  aspirin 81 MG EC tablet, Take 81 mg by mouth Daily., Disp: , Rfl:   •  cefuroxime (CEFTIN) 500 MG tablet, Take 1 tablet by mouth 2 (Two) Times a Day., Disp: 20 tablet, Rfl: 0  •  levothyroxine (Synthroid) 25 MCG tablet, Take 1 tablet by mouth Daily., Disp: 90 tablet, Rfl: 3  •  metoprolol succinate XL (TOPROL-XL) 25 MG 24 hr tablet, Take 0.5 tablets by mouth Daily., Disp: 45 tablet, Rfl: 1  •  montelukast (Singulair) 10 MG tablet, Take 1 tablet by mouth Every Night., Disp: 90 tablet, Rfl: 3  •  nitroglycerin (NITROLINGUAL) 0.4 MG/SPRAY spray, Place 1 spray under the tongue Every 5 (Five) Minutes As Needed for Chest Pain., Disp: 1 each, Rfl: 1  •  rosuvastatin (CRESTOR) 40 MG tablet, TAKE 1 TABLET BY MOUTH EVERY NIGHT AT BEDTIME, Disp: 90 tablet, Rfl: 1            Objective     Vital Signs:   There were no vitals taken for this visit.             Assessment and Plan    Diagnoses and all orders for this visit:    1. Elevated PSA (Primary)      Elevated PSA      Patient's MRI continues to show a PI-RADS 4 lesion - stable.  We discussed again risk and benefits of MRI fusion prostate biopsy versus conservative monitoring.     At this time after discussion patient would like to continue to conservatively monitor.  He understands risk and benefits both ways    Patient is having family issues his daughter is very sick and he is wanting to attend to this currently.      Patient did state he would be okay with having a biopsy within the next year  if things continue to look like there is an area of concern      I will see him back in 6 months with PSA

## 2022-11-28 ENCOUNTER — OFFICE VISIT (OUTPATIENT)
Dept: UROLOGY | Facility: CLINIC | Age: 72
End: 2022-11-28

## 2022-11-28 VITALS — RESPIRATION RATE: 12 BRPM | HEIGHT: 67 IN | BODY MASS INDEX: 29.41 KG/M2 | WEIGHT: 187.4 LBS

## 2022-11-28 DIAGNOSIS — R97.20 ELEVATED PSA: ICD-10-CM

## 2022-11-28 DIAGNOSIS — R97.20 ELEVATED PSA: Primary | ICD-10-CM

## 2022-11-28 PROCEDURE — 99213 OFFICE O/P EST LOW 20 MIN: CPT | Performed by: UROLOGY

## 2022-11-29 ENCOUNTER — OFFICE VISIT (OUTPATIENT)
Dept: CARDIAC REHAB | Facility: HOSPITAL | Age: 72
End: 2022-11-29

## 2022-11-29 VITALS
WEIGHT: 183.64 LBS | DIASTOLIC BLOOD PRESSURE: 80 MMHG | OXYGEN SATURATION: 96 % | SYSTOLIC BLOOD PRESSURE: 140 MMHG | HEART RATE: 66 BPM | BODY MASS INDEX: 28.76 KG/M2

## 2022-11-29 DIAGNOSIS — T82.855D STENOSIS OF CORONARY ARTERY STENT, SUBSEQUENT ENCOUNTER: Primary | ICD-10-CM

## 2022-12-01 ENCOUNTER — OFFICE VISIT (OUTPATIENT)
Dept: CARDIAC REHAB | Facility: HOSPITAL | Age: 72
End: 2022-12-01

## 2022-12-01 VITALS
HEART RATE: 67 BPM | DIASTOLIC BLOOD PRESSURE: 80 MMHG | SYSTOLIC BLOOD PRESSURE: 120 MMHG | BODY MASS INDEX: 28.52 KG/M2 | WEIGHT: 182.1 LBS | OXYGEN SATURATION: 94 %

## 2022-12-01 DIAGNOSIS — T82.855D STENOSIS OF CORONARY ARTERY STENT, SUBSEQUENT ENCOUNTER: Primary | ICD-10-CM

## 2022-12-06 ENCOUNTER — OFFICE VISIT (OUTPATIENT)
Dept: CARDIAC REHAB | Facility: HOSPITAL | Age: 72
End: 2022-12-06

## 2022-12-06 VITALS
OXYGEN SATURATION: 96 % | DIASTOLIC BLOOD PRESSURE: 80 MMHG | BODY MASS INDEX: 29.21 KG/M2 | WEIGHT: 186.51 LBS | HEART RATE: 59 BPM | SYSTOLIC BLOOD PRESSURE: 130 MMHG

## 2022-12-06 DIAGNOSIS — T82.855D STENOSIS OF CORONARY ARTERY STENT, SUBSEQUENT ENCOUNTER: Primary | ICD-10-CM

## 2022-12-13 ENCOUNTER — OFFICE VISIT (OUTPATIENT)
Dept: CARDIAC REHAB | Facility: HOSPITAL | Age: 72
End: 2022-12-13

## 2022-12-13 VITALS
BODY MASS INDEX: 29 KG/M2 | HEART RATE: 57 BPM | WEIGHT: 185.19 LBS | SYSTOLIC BLOOD PRESSURE: 140 MMHG | DIASTOLIC BLOOD PRESSURE: 80 MMHG | OXYGEN SATURATION: 98 %

## 2022-12-13 DIAGNOSIS — T82.855D STENOSIS OF CORONARY ARTERY STENT, SUBSEQUENT ENCOUNTER: Primary | ICD-10-CM

## 2022-12-15 ENCOUNTER — APPOINTMENT (OUTPATIENT)
Dept: CARDIAC REHAB | Facility: HOSPITAL | Age: 72
End: 2022-12-15

## 2022-12-20 ENCOUNTER — OFFICE VISIT (OUTPATIENT)
Dept: CARDIAC REHAB | Facility: HOSPITAL | Age: 72
End: 2022-12-20

## 2022-12-20 VITALS
OXYGEN SATURATION: 97 % | DIASTOLIC BLOOD PRESSURE: 82 MMHG | SYSTOLIC BLOOD PRESSURE: 134 MMHG | WEIGHT: 185.85 LBS | HEART RATE: 63 BPM | BODY MASS INDEX: 29.11 KG/M2

## 2022-12-20 DIAGNOSIS — T82.855D STENOSIS OF CORONARY ARTERY STENT, SUBSEQUENT ENCOUNTER: Primary | ICD-10-CM

## 2022-12-22 ENCOUNTER — OFFICE VISIT (OUTPATIENT)
Dept: CARDIAC REHAB | Facility: HOSPITAL | Age: 72
End: 2022-12-22

## 2022-12-22 VITALS
OXYGEN SATURATION: 98 % | BODY MASS INDEX: 29.35 KG/M2 | HEART RATE: 98 BPM | DIASTOLIC BLOOD PRESSURE: 60 MMHG | SYSTOLIC BLOOD PRESSURE: 110 MMHG | WEIGHT: 187.39 LBS

## 2022-12-22 DIAGNOSIS — T82.855D STENOSIS OF CORONARY ARTERY STENT, SUBSEQUENT ENCOUNTER: Primary | ICD-10-CM

## 2022-12-28 DIAGNOSIS — J01.01 ACUTE RECURRENT MAXILLARY SINUSITIS: ICD-10-CM

## 2022-12-28 RX ORDER — CEFUROXIME AXETIL 500 MG/1
500 TABLET ORAL 2 TIMES DAILY
Qty: 20 TABLET | Refills: 0 | Status: SHIPPED | OUTPATIENT
Start: 2022-12-28 | End: 2023-02-06

## 2022-12-29 ENCOUNTER — OFFICE VISIT (OUTPATIENT)
Dept: CARDIAC REHAB | Facility: HOSPITAL | Age: 72
End: 2022-12-29

## 2022-12-29 VITALS
BODY MASS INDEX: 29.21 KG/M2 | SYSTOLIC BLOOD PRESSURE: 138 MMHG | DIASTOLIC BLOOD PRESSURE: 80 MMHG | WEIGHT: 186.51 LBS | HEART RATE: 67 BPM | OXYGEN SATURATION: 97 %

## 2022-12-29 DIAGNOSIS — T82.855D STENOSIS OF CORONARY ARTERY STENT, SUBSEQUENT ENCOUNTER: Primary | ICD-10-CM

## 2023-01-03 ENCOUNTER — OFFICE VISIT (OUTPATIENT)
Dept: CARDIAC REHAB | Facility: HOSPITAL | Age: 73
End: 2023-01-03

## 2023-01-03 VITALS
OXYGEN SATURATION: 97 % | DIASTOLIC BLOOD PRESSURE: 60 MMHG | SYSTOLIC BLOOD PRESSURE: 124 MMHG | WEIGHT: 181.66 LBS | BODY MASS INDEX: 28.45 KG/M2 | HEART RATE: 63 BPM

## 2023-01-03 DIAGNOSIS — T82.855D STENOSIS OF CORONARY ARTERY STENT, SUBSEQUENT ENCOUNTER: Primary | ICD-10-CM

## 2023-01-05 ENCOUNTER — OFFICE VISIT (OUTPATIENT)
Dept: CARDIAC REHAB | Facility: HOSPITAL | Age: 73
End: 2023-01-05

## 2023-01-05 VITALS
WEIGHT: 187.61 LBS | SYSTOLIC BLOOD PRESSURE: 140 MMHG | BODY MASS INDEX: 29.38 KG/M2 | HEART RATE: 56 BPM | OXYGEN SATURATION: 98 % | DIASTOLIC BLOOD PRESSURE: 80 MMHG

## 2023-01-05 DIAGNOSIS — T82.855D STENOSIS OF CORONARY ARTERY STENT, SUBSEQUENT ENCOUNTER: Primary | ICD-10-CM

## 2023-01-10 ENCOUNTER — OFFICE VISIT (OUTPATIENT)
Dept: CARDIAC REHAB | Facility: HOSPITAL | Age: 73
End: 2023-01-10

## 2023-01-10 VITALS
BODY MASS INDEX: 29.52 KG/M2 | SYSTOLIC BLOOD PRESSURE: 140 MMHG | OXYGEN SATURATION: 97 % | HEART RATE: 66 BPM | DIASTOLIC BLOOD PRESSURE: 80 MMHG | WEIGHT: 188.49 LBS

## 2023-01-10 DIAGNOSIS — T82.855D STENOSIS OF CORONARY ARTERY STENT, SUBSEQUENT ENCOUNTER: Primary | ICD-10-CM

## 2023-01-12 ENCOUNTER — OFFICE VISIT (OUTPATIENT)
Dept: CARDIAC REHAB | Facility: HOSPITAL | Age: 73
End: 2023-01-12

## 2023-01-12 VITALS
BODY MASS INDEX: 29.18 KG/M2 | WEIGHT: 186.29 LBS | HEART RATE: 66 BPM | SYSTOLIC BLOOD PRESSURE: 132 MMHG | DIASTOLIC BLOOD PRESSURE: 80 MMHG | OXYGEN SATURATION: 95 %

## 2023-01-12 DIAGNOSIS — T82.855D STENOSIS OF CORONARY ARTERY STENT, SUBSEQUENT ENCOUNTER: Primary | ICD-10-CM

## 2023-01-16 ENCOUNTER — OFFICE VISIT (OUTPATIENT)
Dept: INTERNAL MEDICINE | Facility: CLINIC | Age: 73
End: 2023-01-16
Payer: COMMERCIAL

## 2023-01-16 VITALS
BODY MASS INDEX: 29.82 KG/M2 | WEIGHT: 190 LBS | OXYGEN SATURATION: 96 % | SYSTOLIC BLOOD PRESSURE: 161 MMHG | TEMPERATURE: 99.1 F | HEIGHT: 67 IN | DIASTOLIC BLOOD PRESSURE: 84 MMHG | HEART RATE: 64 BPM

## 2023-01-16 DIAGNOSIS — I34.0 MITRAL VALVE INSUFFICIENCY, UNSPECIFIED ETIOLOGY: ICD-10-CM

## 2023-01-16 DIAGNOSIS — Z95.5 S/P CORONARY ARTERY STENT PLACEMENT: ICD-10-CM

## 2023-01-16 DIAGNOSIS — J06.9 ACUTE URI: ICD-10-CM

## 2023-01-16 DIAGNOSIS — J45.20 MILD INTERMITTENT ASTHMA, UNSPECIFIED WHETHER COMPLICATED: Primary | ICD-10-CM

## 2023-01-16 PROCEDURE — 99213 OFFICE O/P EST LOW 20 MIN: CPT | Performed by: INTERNAL MEDICINE

## 2023-01-16 RX ORDER — PREDNISONE 50 MG/1
50 TABLET ORAL DAILY
Qty: 6 TABLET | Refills: 0 | Status: SHIPPED | OUTPATIENT
Start: 2023-01-16 | End: 2023-02-06

## 2023-01-16 RX ORDER — ALBUTEROL SULFATE 90 UG/1
2 AEROSOL, METERED RESPIRATORY (INHALATION) EVERY 4 HOURS PRN
Qty: 8.5 G | Refills: 2 | Status: SHIPPED | OUTPATIENT
Start: 2023-01-16

## 2023-01-16 NOTE — PROGRESS NOTES
"CHIEF COMPLAINT:  Wheezing and Cough      HPI: Patient is concerned has had pneumonia in the past he is worried that he might be developing that again he is having trouble at night especially when he lays down is coughing and wheezing, been going on several days getting worse,    Wife's also been sick,        Objective   Vital Signs  Vitals:    01/16/23 1410   BP: 161/84   Pulse: 64   Temp: 99.1 °F (37.3 °C)   SpO2: 96%   Weight: 86.2 kg (190 lb)   Height: 170.2 cm (67.01\")      Body mass index is 29.75 kg/m².  Review of Systems essentially nonproductive cough, but wheezing, no fevers, no hemoptysis  Physical Exam faint wheeze, posterior, otherwise clear throat is clear, cardiac exam reveals regular rhythm without murmur gallop or rub, patient is alert and oriented x3,  Result Review :   No results found for: PROBNP, BNP  CMP    CMP 4/1/22 7/8/22   Glucose 105 (A) 110 (A)   BUN 16 17   Creatinine 1.31 (A) 1.25   eGFR 57.8 (A) 60.8   Sodium 141 138   Potassium 4.4 4.3   Chloride 105 105   Calcium 9.3 8.8   Total Protein 6.5 6.6   Albumin 4.50 4.40   Globulin 2.0 2.2   Total Bilirubin 0.4 0.3   Alkaline Phosphatase 60 62   AST (SGOT) 25 27   ALT (SGPT) 44 (A) 32   Albumin/Globulin Ratio 2.3 2.0   BUN/Creatinine Ratio 12.2 13.6   Anion Gap 7.6 8.3   (A) Abnormal value       Comments are available for some flowsheets but are not being displayed.           CBC w/diff    CBC w/Diff 4/1/22 7/8/22   WBC 4.97 4.82   RBC 4.97 5.04   Hemoglobin 15.2 15.5   Hematocrit 45.3 44.1   MCV 91.1 87.5   MCH 30.6 30.8   MCHC 33.6 35.1   RDW 12.2 (A) 12.1 (A)   Platelets 166 164   Neutrophil Rel % 54.7 50.7   Immature Granulocyte Rel % 0.2 0.0   Lymphocyte Rel % 31.4 36.7   Monocyte Rel % 10.3 8.9   Eosinophil Rel % 2.2 2.5   Basophil Rel % 1.2 1.2   (A) Abnormal value             Lipid Panel    Lipid Panel 4/1/22 7/8/22   Total Cholesterol 138 137   Triglycerides 122 134   HDL Cholesterol 41 41   VLDL Cholesterol 22 24   LDL Cholesterol "  75 72   LDL/HDL Ratio 1.77 1.69            Lab Results   Component Value Date    TSH 4.960 (H) 07/08/2022    TSH 3.600 04/01/2022    TSH 6.160 (H) 12/30/2021      Lab Results   Component Value Date    FREET4 1.15 07/08/2022    FREET4 1.23 04/01/2022    FREET4 1.11 12/30/2021      A1C Last 3 Results    HGBA1C Last 3 Results 4/1/22 7/8/22   Hemoglobin A1C 5.90 (A) 6.00 (A)   (A) Abnormal value             PSA    PSA 4/1/22 11/22/22   PSA 7.240 (A) 7.370 (A)   (A) Abnormal value                           Visit Diagnoses:    ICD-10-CM ICD-9-CM   1. Mild intermittent asthma, unspecified whether complicated  J45.20 493.90   2. Acute URI  J06.9 465.9   3. S/P coronary artery stent placement  Z95.5 V45.82   4. Mitral valve insufficiency, unspecified etiology  I34.0 424.0       Assessment and Plan   Diagnoses and all orders for this visit:    1. Mild intermittent asthma, unspecified whether complicated (Primary)    2. Acute URI    3. S/P coronary artery stent placement    4. Mitral valve insufficiency, unspecified etiology    Other orders  -     albuterol sulfate  (90 Base) MCG/ACT inhaler; Inhale 2 puffs Every 4 (Four) Hours As Needed for Wheezing.  Dispense: 8.5 g; Refill: 2  -     predniSONE (DELTASONE) 50 MG tablet; Take 1 tablet by mouth Daily.  Dispense: 6 tablet; Refill: 0    Upper respiratory infection, wheezing, reactive asthma, will treat with antibiotics, possibly needs steroids inhalers January 16, 2023    Coronary artery disease previous stent placement 2005 patient still goes to cardiac rehab,------ does follow-up with [place] once a year,, continues Crestor 40 mg daily,    Elevated PSA follows up with Dr. Rubio had MRI November 2022,--- MRI shows a peer RADS 4 lesion within the right peripheral zone no change or stable, benign prostatic hyperplasia otherwise, has had several biopsies in the past,    Impaired fasting glucose hemoglobin A1c 6.0 July 2022,     creatinine up slightly at  1.25-1.31, July 2022    Allergic rhinitis, allergies, continues on Singulair 10 mg daily    Hypothyroidism, continues on levothyroxine 0.025 mg daily elevated TSH on last visit July 8, 2022 we will follow-up      Follow Up   Return for Next scheduled follow up.  Patient was given instructions and counseling regarding his condition or for health maintenance advice. Please see specific information pulled into the AVS if appropriate.

## 2023-01-19 ENCOUNTER — OFFICE VISIT (OUTPATIENT)
Dept: CARDIAC REHAB | Facility: HOSPITAL | Age: 73
End: 2023-01-19

## 2023-01-19 VITALS
BODY MASS INDEX: 29.14 KG/M2 | HEART RATE: 77 BPM | DIASTOLIC BLOOD PRESSURE: 70 MMHG | SYSTOLIC BLOOD PRESSURE: 120 MMHG | OXYGEN SATURATION: 99 % | WEIGHT: 186.07 LBS

## 2023-01-19 DIAGNOSIS — T82.855D STENOSIS OF CORONARY ARTERY STENT, SUBSEQUENT ENCOUNTER: Primary | ICD-10-CM

## 2023-01-24 ENCOUNTER — OFFICE VISIT (OUTPATIENT)
Dept: CARDIAC REHAB | Facility: HOSPITAL | Age: 73
End: 2023-01-24

## 2023-01-24 VITALS
WEIGHT: 185.19 LBS | SYSTOLIC BLOOD PRESSURE: 124 MMHG | HEART RATE: 58 BPM | OXYGEN SATURATION: 99 % | BODY MASS INDEX: 29 KG/M2 | DIASTOLIC BLOOD PRESSURE: 80 MMHG

## 2023-01-24 DIAGNOSIS — T82.855D STENOSIS OF CORONARY ARTERY STENT, SUBSEQUENT ENCOUNTER: Primary | ICD-10-CM

## 2023-01-26 ENCOUNTER — OFFICE VISIT (OUTPATIENT)
Dept: CARDIAC REHAB | Facility: HOSPITAL | Age: 73
End: 2023-01-26

## 2023-01-26 VITALS
SYSTOLIC BLOOD PRESSURE: 138 MMHG | DIASTOLIC BLOOD PRESSURE: 78 MMHG | WEIGHT: 186.73 LBS | HEART RATE: 64 BPM | OXYGEN SATURATION: 99 % | BODY MASS INDEX: 29.24 KG/M2

## 2023-01-26 DIAGNOSIS — T82.855D STENOSIS OF CORONARY ARTERY STENT, SUBSEQUENT ENCOUNTER: Primary | ICD-10-CM

## 2023-01-31 ENCOUNTER — APPOINTMENT (OUTPATIENT)
Dept: CARDIAC REHAB | Facility: HOSPITAL | Age: 73
End: 2023-01-31

## 2023-02-02 ENCOUNTER — OFFICE VISIT (OUTPATIENT)
Dept: CARDIAC REHAB | Facility: HOSPITAL | Age: 73
End: 2023-02-02

## 2023-02-02 VITALS
SYSTOLIC BLOOD PRESSURE: 110 MMHG | OXYGEN SATURATION: 97 % | WEIGHT: 184.97 LBS | DIASTOLIC BLOOD PRESSURE: 82 MMHG | HEART RATE: 73 BPM | BODY MASS INDEX: 28.96 KG/M2

## 2023-02-02 DIAGNOSIS — T82.855D STENOSIS OF CORONARY ARTERY STENT, SUBSEQUENT ENCOUNTER: Primary | ICD-10-CM

## 2023-02-03 ENCOUNTER — LAB (OUTPATIENT)
Dept: LAB | Facility: HOSPITAL | Age: 73
End: 2023-02-03
Payer: COMMERCIAL

## 2023-02-03 ENCOUNTER — CLINICAL SUPPORT (OUTPATIENT)
Dept: GASTROENTEROLOGY | Facility: CLINIC | Age: 73
End: 2023-02-03
Payer: COMMERCIAL

## 2023-02-03 ENCOUNTER — PREP FOR SURGERY (OUTPATIENT)
Dept: OTHER | Facility: HOSPITAL | Age: 73
End: 2023-02-03
Payer: COMMERCIAL

## 2023-02-03 DIAGNOSIS — E78.00 HIGH CHOLESTEROL: ICD-10-CM

## 2023-02-03 DIAGNOSIS — I10 PRIMARY HYPERTENSION: ICD-10-CM

## 2023-02-03 DIAGNOSIS — E78.00 PURE HYPERCHOLESTEROLEMIA: ICD-10-CM

## 2023-02-03 DIAGNOSIS — E11.65 TYPE 2 DIABETES MELLITUS WITH HYPERGLYCEMIA, WITHOUT LONG-TERM CURRENT USE OF INSULIN: ICD-10-CM

## 2023-02-03 DIAGNOSIS — E55.9 VITAMIN D DEFICIENCY: ICD-10-CM

## 2023-02-03 DIAGNOSIS — E53.8 B12 DEFICIENCY: ICD-10-CM

## 2023-02-03 DIAGNOSIS — Z79.899 ENCOUNTER FOR LONG-TERM (CURRENT) USE OF OTHER MEDICATIONS: ICD-10-CM

## 2023-02-03 DIAGNOSIS — Z86.010 HISTORY OF COLON POLYPS: Primary | ICD-10-CM

## 2023-02-03 DIAGNOSIS — E03.8 SUBCLINICAL HYPOTHYROIDISM: ICD-10-CM

## 2023-02-03 DIAGNOSIS — I25.10 CORONARY ARTERY DISEASE INVOLVING NATIVE CORONARY ARTERY OF NATIVE HEART WITHOUT ANGINA PECTORIS: ICD-10-CM

## 2023-02-03 DIAGNOSIS — R73.03 PREDIABETES: ICD-10-CM

## 2023-02-03 PROBLEM — Z86.0100 HISTORY OF COLON POLYPS: Status: ACTIVE | Noted: 2023-02-03

## 2023-02-03 LAB
25(OH)D3 SERPL-MCNC: 19.8 NG/ML (ref 30–100)
ALBUMIN SERPL-MCNC: 4.3 G/DL (ref 3.5–5.2)
ALBUMIN UR-MCNC: <1.2 MG/DL
ALBUMIN/GLOB SERPL: 2 G/DL
ALP SERPL-CCNC: 64 U/L (ref 39–117)
ALT SERPL W P-5'-P-CCNC: 46 U/L (ref 1–41)
ANION GAP SERPL CALCULATED.3IONS-SCNC: 6.9 MMOL/L (ref 5–15)
AST SERPL-CCNC: 33 U/L (ref 1–40)
BASOPHILS # BLD AUTO: 0.06 10*3/MM3 (ref 0–0.2)
BASOPHILS NFR BLD AUTO: 1.1 % (ref 0–1.5)
BILIRUB SERPL-MCNC: 0.2 MG/DL (ref 0–1.2)
BUN SERPL-MCNC: 17 MG/DL (ref 8–23)
BUN/CREAT SERPL: 13.7 (ref 7–25)
CALCIUM SPEC-SCNC: 9.9 MG/DL (ref 8.6–10.5)
CHLORIDE SERPL-SCNC: 109 MMOL/L (ref 98–107)
CHOLEST SERPL-MCNC: 163 MG/DL (ref 0–200)
CO2 SERPL-SCNC: 29.1 MMOL/L (ref 22–29)
CREAT SERPL-MCNC: 1.24 MG/DL (ref 0.76–1.27)
CREAT UR-MCNC: 149 MG/DL
DEPRECATED RDW RBC AUTO: 39.3 FL (ref 37–54)
EGFRCR SERPLBLD CKD-EPI 2021: 61.4 ML/MIN/1.73
EOSINOPHIL # BLD AUTO: 0.2 10*3/MM3 (ref 0–0.4)
EOSINOPHIL NFR BLD AUTO: 3.7 % (ref 0.3–6.2)
ERYTHROCYTE [DISTWIDTH] IN BLOOD BY AUTOMATED COUNT: 11.9 % (ref 12.3–15.4)
FOLATE SERPL-MCNC: 5.54 NG/ML (ref 4.78–24.2)
GLOBULIN UR ELPH-MCNC: 2.2 GM/DL
GLUCOSE SERPL-MCNC: 123 MG/DL (ref 65–99)
HBA1C MFR BLD: 6.3 % (ref 4.8–5.6)
HCT VFR BLD AUTO: 44.5 % (ref 37.5–51)
HDLC SERPL-MCNC: 41 MG/DL (ref 40–60)
HGB BLD-MCNC: 14.5 G/DL (ref 13–17.7)
IMM GRANULOCYTES # BLD AUTO: 0.02 10*3/MM3 (ref 0–0.05)
IMM GRANULOCYTES NFR BLD AUTO: 0.4 % (ref 0–0.5)
LDLC SERPL CALC-MCNC: 87 MG/DL (ref 0–100)
LDLC/HDLC SERPL: 1.97 {RATIO}
LYMPHOCYTES # BLD AUTO: 1.59 10*3/MM3 (ref 0.7–3.1)
LYMPHOCYTES NFR BLD AUTO: 29.3 % (ref 19.6–45.3)
MAGNESIUM SERPL-MCNC: 2 MG/DL (ref 1.6–2.4)
MCH RBC QN AUTO: 29.6 PG (ref 26.6–33)
MCHC RBC AUTO-ENTMCNC: 32.6 G/DL (ref 31.5–35.7)
MCV RBC AUTO: 90.8 FL (ref 79–97)
MICROALBUMIN/CREAT UR: NORMAL MG/G{CREAT}
MONOCYTES # BLD AUTO: 0.48 10*3/MM3 (ref 0.1–0.9)
MONOCYTES NFR BLD AUTO: 8.8 % (ref 5–12)
NEUTROPHILS NFR BLD AUTO: 3.08 10*3/MM3 (ref 1.7–7)
NEUTROPHILS NFR BLD AUTO: 56.7 % (ref 42.7–76)
NRBC BLD AUTO-RTO: 0 /100 WBC (ref 0–0.2)
PLATELET # BLD AUTO: 213 10*3/MM3 (ref 140–450)
PMV BLD AUTO: 10.1 FL (ref 6–12)
POTASSIUM SERPL-SCNC: 4.5 MMOL/L (ref 3.5–5.2)
PROT SERPL-MCNC: 6.5 G/DL (ref 6–8.5)
RBC # BLD AUTO: 4.9 10*6/MM3 (ref 4.14–5.8)
SODIUM SERPL-SCNC: 145 MMOL/L (ref 136–145)
T3FREE SERPL-MCNC: 2.74 PG/ML (ref 2–4.4)
T4 FREE SERPL-MCNC: 1.11 NG/DL (ref 0.93–1.7)
TRIGL SERPL-MCNC: 206 MG/DL (ref 0–150)
TSH SERPL DL<=0.05 MIU/L-ACNC: 3.45 UIU/ML (ref 0.27–4.2)
VIT B12 BLD-MCNC: 593 PG/ML (ref 211–946)
VLDLC SERPL-MCNC: 35 MG/DL (ref 5–40)
WBC NRBC COR # BLD: 5.43 10*3/MM3 (ref 3.4–10.8)

## 2023-02-03 PROCEDURE — 82746 ASSAY OF FOLIC ACID SERUM: CPT

## 2023-02-03 PROCEDURE — 82607 VITAMIN B-12: CPT

## 2023-02-03 PROCEDURE — 36415 COLL VENOUS BLD VENIPUNCTURE: CPT

## 2023-02-03 PROCEDURE — 82043 UR ALBUMIN QUANTITATIVE: CPT

## 2023-02-03 PROCEDURE — 82570 ASSAY OF URINE CREATININE: CPT

## 2023-02-03 PROCEDURE — 80050 GENERAL HEALTH PANEL: CPT

## 2023-02-03 PROCEDURE — 80061 LIPID PANEL: CPT

## 2023-02-03 PROCEDURE — 84439 ASSAY OF FREE THYROXINE: CPT

## 2023-02-03 PROCEDURE — 82306 VITAMIN D 25 HYDROXY: CPT

## 2023-02-03 PROCEDURE — 84481 FREE ASSAY (FT-3): CPT

## 2023-02-03 PROCEDURE — 83735 ASSAY OF MAGNESIUM: CPT

## 2023-02-03 PROCEDURE — 83036 HEMOGLOBIN GLYCOSYLATED A1C: CPT

## 2023-02-03 RX ORDER — SODIUM PICOSULFATE, MAGNESIUM OXIDE, AND ANHYDROUS CITRIC ACID 10; 3.5; 12 MG/160ML; G/160ML; G/160ML
160 LIQUID ORAL TAKE AS DIRECTED
Qty: 320 ML | Refills: 0 | Status: SHIPPED | OUTPATIENT
Start: 2023-02-03

## 2023-02-03 NOTE — PROGRESS NOTES
Yann Pardo     REASON FOR CALL-colonoscopy, history of colon polyps, last colon Dr. Orellana    SENT IN PREP-Clenpiq   DOS-2023   Past Medical History:   Diagnosis Date   • Allergic    • Asthma     MILD   • Bronchospasm     EXACERBATED BY ALERGIES   • Bronchospasm    • CAD (coronary artery disease)    • Colon polyp    • Diverticulosis    • Elevated PSA    • GERD (gastroesophageal reflux disease)    • Heart disease    • Hemorrhoids    • Hyperglycemia    • Hyperlipidemia    • Hypertension    • Lung collapse    • Mitral regurgitation     MILD-MODERATE   • Rib fracture    • Sinus bradycardia     ASYMPTOMATIC   • Sinus bradycardia    • Sinusitis    • Tubular adenoma     POLYPS      No Known Allergies  Past Surgical History:   Procedure Laterality Date   • COLONOSCOPY       DIVERTIC., HEMORRHOIDS NEG. POLYPS, 3/15 2 TA POLYPS   • COLONOSCOPY     • CORONARY STENT PLACEMENT     • CORONARY STENT PLACEMENT         • HERNIA REPAIR     • PORTACATH PLACEMENT     • PROSTATE BIOPSY      Transrectal     Social History     Socioeconomic History   • Marital status:    Tobacco Use   • Smoking status: Former     Packs/day: 0.50     Years: 4.00     Pack years: 2.00     Types: Cigarettes     Quit date: 1973     Years since quittin.1   • Smokeless tobacco: Former     Types: Chew     Quit date:    Vaping Use   • Vaping Use: Never used   Substance and Sexual Activity   • Alcohol use: Yes     Alcohol/week: 6.0 standard drinks     Types: 6 Cans of beer per week   • Drug use: Never   • Sexual activity: Defer     Family History   Problem Relation Age of Onset   • Kidney cancer Father    • Coronary artery disease Other    • Colon cancer Neg Hx        Current Outpatient Medications:   •  aspirin 81 MG EC tablet, Take 81 mg by mouth Daily., Disp: , Rfl:   •  levothyroxine (Synthroid) 25 MCG tablet, Take 1 tablet by mouth Daily., Disp: 90 tablet, Rfl: 3  •  metoprolol succinate XL (TOPROL-XL) 25 MG 24 hr tablet,  Take 0.5 tablets by mouth Daily., Disp: 45 tablet, Rfl: 1  •  nitroglycerin (NITROLINGUAL) 0.4 MG/SPRAY spray, Place 1 spray under the tongue Every 5 (Five) Minutes As Needed for Chest Pain., Disp: 1 each, Rfl: 1  •  rosuvastatin (CRESTOR) 40 MG tablet, TAKE 1 TABLET BY MOUTH EVERY NIGHT AT BEDTIME, Disp: 90 tablet, Rfl: 1  •  albuterol sulfate  (90 Base) MCG/ACT inhaler, Inhale 2 puffs Every 4 (Four) Hours As Needed for Wheezing., Disp: 8.5 g, Rfl: 2  •  cefuroxime (CEFTIN) 500 MG tablet, Take 1 tablet by mouth 2 (Two) Times a Day., Disp: 20 tablet, Rfl: 0  •  montelukast (Singulair) 10 MG tablet, Take 1 tablet by mouth Every Night., Disp: 90 tablet, Rfl: 3  •  predniSONE (DELTASONE) 50 MG tablet, Take 1 tablet by mouth Daily., Disp: 6 tablet, Rfl: 0

## 2023-02-06 ENCOUNTER — TELEPHONE (OUTPATIENT)
Dept: GASTROENTEROLOGY | Facility: CLINIC | Age: 73
End: 2023-02-06
Payer: COMMERCIAL

## 2023-02-06 ENCOUNTER — OFFICE VISIT (OUTPATIENT)
Dept: INTERNAL MEDICINE | Facility: CLINIC | Age: 73
End: 2023-02-06
Payer: COMMERCIAL

## 2023-02-06 VITALS
BODY MASS INDEX: 29.85 KG/M2 | OXYGEN SATURATION: 98 % | DIASTOLIC BLOOD PRESSURE: 80 MMHG | TEMPERATURE: 98.4 F | HEART RATE: 52 BPM | WEIGHT: 190.2 LBS | HEIGHT: 67 IN | SYSTOLIC BLOOD PRESSURE: 135 MMHG

## 2023-02-06 DIAGNOSIS — E78.00 PURE HYPERCHOLESTEROLEMIA: ICD-10-CM

## 2023-02-06 DIAGNOSIS — R97.20 ELEVATED PSA: ICD-10-CM

## 2023-02-06 DIAGNOSIS — I25.10 CORONARY ARTERY DISEASE INVOLVING NATIVE CORONARY ARTERY OF NATIVE HEART WITHOUT ANGINA PECTORIS: ICD-10-CM

## 2023-02-06 DIAGNOSIS — E03.8 SUBCLINICAL HYPOTHYROIDISM: ICD-10-CM

## 2023-02-06 DIAGNOSIS — J45.20 MILD INTERMITTENT ASTHMA, UNSPECIFIED WHETHER COMPLICATED: Primary | ICD-10-CM

## 2023-02-06 DIAGNOSIS — I49.3 PVC (PREMATURE VENTRICULAR CONTRACTION): ICD-10-CM

## 2023-02-06 DIAGNOSIS — Z86.010 HISTORY OF COLON POLYPS: ICD-10-CM

## 2023-02-06 DIAGNOSIS — R73.01 IFG (IMPAIRED FASTING GLUCOSE): ICD-10-CM

## 2023-02-06 DIAGNOSIS — Z95.5 S/P CORONARY ARTERY STENT PLACEMENT: ICD-10-CM

## 2023-02-06 DIAGNOSIS — I10 PRIMARY HYPERTENSION: ICD-10-CM

## 2023-02-06 DIAGNOSIS — I34.0 MITRAL VALVE INSUFFICIENCY, UNSPECIFIED ETIOLOGY: ICD-10-CM

## 2023-02-06 PROCEDURE — 99214 OFFICE O/P EST MOD 30 MIN: CPT | Performed by: INTERNAL MEDICINE

## 2023-02-06 RX ORDER — SODIUM, POTASSIUM,MAG SULFATES 17.5-3.13G
1 SOLUTION, RECONSTITUTED, ORAL ORAL EVERY 12 HOURS
Qty: 354 ML | Refills: 0 | Status: ON HOLD | OUTPATIENT
Start: 2023-02-06 | End: 2023-03-30

## 2023-02-06 NOTE — TELEPHONE ENCOUNTER
Left message with patient requesting a call back. Was attempting to call patient to advise that a new bowl prep was sent to his pharmacy and to go over new prep instructions.

## 2023-02-06 NOTE — PROGRESS NOTES
"CHIEF COMPLAINT:  Diabetes and Follow-up      HPI:        Objective   Vital Signs  Vitals:    02/06/23 1005 02/06/23 1006   BP: 155/93 135/80   Pulse: 52    Temp: 98.4 °F (36.9 °C)    SpO2: 98%    Weight: 86.3 kg (190 lb 3.2 oz)    Height: 170.2 cm (67.01\")       Body mass index is 29.78 kg/m².  Review of Systems   Constitutional: Negative.    HENT: Negative.    Eyes: Negative.    Respiratory: Negative.    Cardiovascular: Negative.    Gastrointestinal: Negative.    Endocrine: Negative.    Genitourinary: Negative.    Musculoskeletal: Negative.    Allergic/Immunologic: Negative.    Neurological: Negative.    Hematological: Negative.    Psychiatric/Behavioral: Negative.       Physical Exam  Constitutional:       General: He is not in acute distress.     Appearance: Normal appearance.   HENT:      Head: Normocephalic.      Mouth/Throat:      Mouth: Mucous membranes are moist.   Eyes:      Conjunctiva/sclera: Conjunctivae normal.      Pupils: Pupils are equal, round, and reactive to light.   Cardiovascular:      Rate and Rhythm: Normal rate and regular rhythm.      Pulses: Normal pulses.      Heart sounds: Normal heart sounds.   Pulmonary:      Effort: Pulmonary effort is normal.      Breath sounds: Normal breath sounds.   Abdominal:      General: Abdomen is flat. Bowel sounds are normal.      Palpations: Abdomen is soft.   Musculoskeletal:         General: No swelling. Normal range of motion.      Cervical back: Neck supple.   Skin:     General: Skin is warm and dry.      Coloration: Skin is not jaundiced.   Neurological:      General: No focal deficit present.      Mental Status: He is alert and oriented to person, place, and time. Mental status is at baseline.   Psychiatric:         Mood and Affect: Mood normal.         Behavior: Behavior normal.         Thought Content: Thought content normal.         Judgment: Judgment normal.        Result Review :   No results found for: PROBNP, BNP  CMP    CMP 4/1/22 7/8/22 " 2/3/23   Glucose 105 (A) 110 (A) 123 (A)   BUN 16 17 17   Creatinine 1.31 (A) 1.25 1.24   eGFR 57.8 (A) 60.8 61.4   Sodium 141 138 145   Potassium 4.4 4.3 4.5   Chloride 105 105 109 (A)   Calcium 9.3 8.8 9.9   Total Protein 6.5 6.6 6.5   Albumin 4.50 4.40 4.3   Globulin 2.0 2.2 2.2   Total Bilirubin 0.4 0.3 0.2   Alkaline Phosphatase 60 62 64   AST (SGOT) 25 27 33   ALT (SGPT) 44 (A) 32 46 (A)   Albumin/Globulin Ratio 2.3 2.0 2.0   BUN/Creatinine Ratio 12.2 13.6 13.7   Anion Gap 7.6 8.3 6.9   (A) Abnormal value       Comments are available for some flowsheets but are not being displayed.           CBC w/diff    CBC w/Diff 4/1/22 7/8/22 2/3/23   WBC 4.97 4.82 5.43   RBC 4.97 5.04 4.90   Hemoglobin 15.2 15.5 14.5   Hematocrit 45.3 44.1 44.5   MCV 91.1 87.5 90.8   MCH 30.6 30.8 29.6   MCHC 33.6 35.1 32.6   RDW 12.2 (A) 12.1 (A) 11.9 (A)   Platelets 166 164 213   Neutrophil Rel % 54.7 50.7 56.7   Immature Granulocyte Rel % 0.2 0.0 0.4   Lymphocyte Rel % 31.4 36.7 29.3   Monocyte Rel % 10.3 8.9 8.8   Eosinophil Rel % 2.2 2.5 3.7   Basophil Rel % 1.2 1.2 1.1   (A) Abnormal value             Lipid Panel    Lipid Panel 4/1/22 7/8/22 2/3/23   Total Cholesterol 138 137 163   Triglycerides 122 134 206 (A)   HDL Cholesterol 41 41 41   VLDL Cholesterol 22 24 35   LDL Cholesterol  75 72 87   LDL/HDL Ratio 1.77 1.69 1.97   (A) Abnormal value             Lab Results   Component Value Date    TSH 3.450 02/03/2023    TSH 4.960 (H) 07/08/2022    TSH 3.600 04/01/2022      Lab Results   Component Value Date    FREET4 1.11 02/03/2023    FREET4 1.15 07/08/2022    FREET4 1.23 04/01/2022      A1C Last 3 Results    HGBA1C Last 3 Results 4/1/22 7/8/22 2/3/23   Hemoglobin A1C 5.90 (A) 6.00 (A) 6.30 (A)   (A) Abnormal value             PSA    PSA 4/1/22 11/22/22   PSA 7.240 (A) 7.370 (A)   (A) Abnormal value                           Visit Diagnoses:    ICD-10-CM ICD-9-CM   1. Mild intermittent asthma, unspecified whether complicated  J45.20  493.90   2. History of colon polyps  Z86.010 V12.72   3. Primary hypertension  I10 401.9   4. Subclinical hypothyroidism  E03.8 244.8   5. PVC (premature ventricular contraction)  I49.3 427.69   6. Mitral valve insufficiency, unspecified etiology  I34.0 424.0   7. Elevated PSA  R97.20 790.93   8. Coronary artery disease involving native coronary artery of native heart without angina pectoris  I25.10 414.01   9. Pure hypercholesterolemia  E78.00 272.0   10. S/P coronary artery stent placement  Z95.5 V45.82   11. IFG (impaired fasting glucose)  R73.01 790.21       Assessment and Plan   Diagnoses and all orders for this visit:    1. Mild intermittent asthma, unspecified whether complicated (Primary)  -     Comprehensive Metabolic Panel; Future  -     CBC & Differential; Future  -     Lipid Panel; Future  -     Hemoglobin A1c; Future  -     TSH+Free T4; Future    2. History of colon polyps  -     Comprehensive Metabolic Panel; Future  -     CBC & Differential; Future  -     Lipid Panel; Future  -     Hemoglobin A1c; Future  -     TSH+Free T4; Future    3. Primary hypertension  -     Comprehensive Metabolic Panel; Future  -     CBC & Differential; Future  -     Lipid Panel; Future  -     Hemoglobin A1c; Future  -     TSH+Free T4; Future    4. Subclinical hypothyroidism  -     Comprehensive Metabolic Panel; Future  -     CBC & Differential; Future  -     Lipid Panel; Future  -     Hemoglobin A1c; Future  -     TSH+Free T4; Future    5. PVC (premature ventricular contraction)  -     Comprehensive Metabolic Panel; Future  -     CBC & Differential; Future  -     Lipid Panel; Future  -     Hemoglobin A1c; Future  -     TSH+Free T4; Future    6. Mitral valve insufficiency, unspecified etiology  -     Comprehensive Metabolic Panel; Future  -     CBC & Differential; Future  -     Lipid Panel; Future  -     Hemoglobin A1c; Future  -     TSH+Free T4; Future    7. Elevated PSA  -     Comprehensive Metabolic Panel; Future  -     CBC  & Differential; Future  -     Lipid Panel; Future  -     Hemoglobin A1c; Future  -     TSH+Free T4; Future    8. Coronary artery disease involving native coronary artery of native heart without angina pectoris  -     Comprehensive Metabolic Panel; Future  -     CBC & Differential; Future  -     Lipid Panel; Future  -     Hemoglobin A1c; Future  -     TSH+Free T4; Future    9. Pure hypercholesterolemia  -     Comprehensive Metabolic Panel; Future  -     CBC & Differential; Future  -     Lipid Panel; Future  -     Hemoglobin A1c; Future  -     TSH+Free T4; Future    10. S/P coronary artery stent placement  -     Comprehensive Metabolic Panel; Future  -     CBC & Differential; Future  -     Lipid Panel; Future  -     Hemoglobin A1c; Future  -     TSH+Free T4; Future    11. IFG (impaired fasting glucose)  -     Comprehensive Metabolic Panel; Future  -     CBC & Differential; Future  -     Lipid Panel; Future  -     Hemoglobin A1c; Future  -     TSH+Free T4; Future    Upper respiratory infection, wheezing, reactive asthma, will treat with antibiotics, possibly needs steroids inhalers January 16, 2023, clinically improved February 6, 2023,    Coronary artery disease previous stent placement 2005 patient still goes to cardiac rehab,------ does follow-up with [place] once a year,, continues Crestor 40 mg daily, metoprolol extended release 25 mg half a tablet daily aspirin 81 mg daily,--- discussed elevated numbers, patient will work on losing weight exercise, clean eating,    Elevated PSA follows up with Dr. Rubio --------had MRI November 2022,--- MRI shows a peer RADS 4 lesion within the right peripheral zone no change or stable, benign prostatic hyperplasia otherwise, has had several biopsies in the past,    Impaired fasting glucose hemoglobin A1c 6.0 July 2022,, up to 6.3, February 2023    creatinine up slightly at 1.25-1.31, July 2022, stable February 2023,    Allergic rhinitis, allergies, uses  over-the-counter antihistamines,    Hypothyroidism, continues on levothyroxine 0.025 mg daily elevated TSH on last visit July 8, 2022 we will follow-up    Follow Up   Return in about 6 months (around 8/6/2023).  Patient was given instructions and counseling regarding his condition or for health maintenance advice. Please see specific information pulled into the AVS if appropriate.

## 2023-02-07 ENCOUNTER — TELEPHONE (OUTPATIENT)
Dept: INTERNAL MEDICINE | Facility: CLINIC | Age: 73
End: 2023-02-07
Payer: COMMERCIAL

## 2023-02-07 ENCOUNTER — OFFICE VISIT (OUTPATIENT)
Dept: CARDIAC REHAB | Facility: HOSPITAL | Age: 73
End: 2023-02-07

## 2023-02-07 VITALS
OXYGEN SATURATION: 98 % | BODY MASS INDEX: 29.17 KG/M2 | SYSTOLIC BLOOD PRESSURE: 120 MMHG | WEIGHT: 186.29 LBS | HEART RATE: 61 BPM | DIASTOLIC BLOOD PRESSURE: 80 MMHG

## 2023-02-07 DIAGNOSIS — R74.8 ELEVATED LIVER ENZYMES: Primary | ICD-10-CM

## 2023-02-07 DIAGNOSIS — E55.9 VITAMIN D DEFICIENCY: Primary | ICD-10-CM

## 2023-02-07 DIAGNOSIS — T82.855D STENOSIS OF CORONARY ARTERY STENT, SUBSEQUENT ENCOUNTER: Primary | ICD-10-CM

## 2023-02-07 RX ORDER — ERGOCALCIFEROL 1.25 MG/1
50000 CAPSULE ORAL WEEKLY
Qty: 12 CAPSULE | Refills: 0 | Status: SHIPPED | OUTPATIENT
Start: 2023-02-07

## 2023-02-07 RX ORDER — MULTIVIT-MIN/IRON/FOLIC ACID/K 18-600-40
1 CAPSULE ORAL DAILY
Qty: 90 CAPSULE | Refills: 0 | Status: SHIPPED | OUTPATIENT
Start: 2023-02-07

## 2023-02-07 NOTE — TELEPHONE ENCOUNTER
Call patient, tell him his vitamin D level was low and for him to continue taking vitamin D supplements he may need to increase his dose if he is already taking some, tell him his urine test did not show any protein which was good,    Tell him I am going to check the liver test also in August with his next blood work which will be there when he gets it drawn

## 2023-02-07 NOTE — TELEPHONE ENCOUNTER
Labs reviewed patient with low vitamin D which can cause fatigue.  Much lower than in the past recommend starting high-dose vitamin D once a week for 3 months and then start daily vitamin D 2000 units daily we will send these over to his pharmacy.  His liver kidneys function are are good along with his thyroid B12 folate and magnesium.  A1c was 6.3 with continue with a low-carb diet this is just slightly up from his previous labs but goal is to keep it close to 6.  Keep appointment with Dr. Ventura as scheduled in August.  Or sooner if any concerns.

## 2023-02-09 ENCOUNTER — OFFICE VISIT (OUTPATIENT)
Dept: CARDIAC REHAB | Facility: HOSPITAL | Age: 73
End: 2023-02-09

## 2023-02-09 VITALS
HEART RATE: 63 BPM | OXYGEN SATURATION: 96 % | SYSTOLIC BLOOD PRESSURE: 120 MMHG | DIASTOLIC BLOOD PRESSURE: 70 MMHG | WEIGHT: 186.29 LBS | BODY MASS INDEX: 29.17 KG/M2

## 2023-02-09 DIAGNOSIS — T82.855D STENOSIS OF CORONARY ARTERY STENT, SUBSEQUENT ENCOUNTER: Primary | ICD-10-CM

## 2023-02-14 ENCOUNTER — OFFICE VISIT (OUTPATIENT)
Dept: CARDIAC REHAB | Facility: HOSPITAL | Age: 73
End: 2023-02-14

## 2023-02-14 VITALS
WEIGHT: 186.29 LBS | SYSTOLIC BLOOD PRESSURE: 112 MMHG | BODY MASS INDEX: 29.17 KG/M2 | DIASTOLIC BLOOD PRESSURE: 80 MMHG | HEART RATE: 57 BPM | OXYGEN SATURATION: 96 %

## 2023-02-14 DIAGNOSIS — T82.855D STENOSIS OF CORONARY ARTERY STENT, SUBSEQUENT ENCOUNTER: Primary | ICD-10-CM

## 2023-02-16 ENCOUNTER — OFFICE VISIT (OUTPATIENT)
Dept: CARDIAC REHAB | Facility: HOSPITAL | Age: 73
End: 2023-02-16

## 2023-02-16 VITALS
BODY MASS INDEX: 29.03 KG/M2 | DIASTOLIC BLOOD PRESSURE: 82 MMHG | OXYGEN SATURATION: 97 % | HEART RATE: 62 BPM | WEIGHT: 185.41 LBS | SYSTOLIC BLOOD PRESSURE: 130 MMHG

## 2023-02-16 DIAGNOSIS — T82.855D STENOSIS OF CORONARY ARTERY STENT, SUBSEQUENT ENCOUNTER: Primary | ICD-10-CM

## 2023-02-21 ENCOUNTER — OFFICE VISIT (OUTPATIENT)
Dept: CARDIAC REHAB | Facility: HOSPITAL | Age: 73
End: 2023-02-21

## 2023-02-21 VITALS
DIASTOLIC BLOOD PRESSURE: 80 MMHG | OXYGEN SATURATION: 96 % | BODY MASS INDEX: 29.34 KG/M2 | WEIGHT: 187.39 LBS | SYSTOLIC BLOOD PRESSURE: 118 MMHG | HEART RATE: 58 BPM

## 2023-02-21 DIAGNOSIS — T82.855D STENOSIS OF CORONARY ARTERY STENT, SUBSEQUENT ENCOUNTER: Primary | ICD-10-CM

## 2023-02-23 ENCOUNTER — OFFICE VISIT (OUTPATIENT)
Dept: CARDIAC REHAB | Facility: HOSPITAL | Age: 73
End: 2023-02-23

## 2023-02-23 VITALS
SYSTOLIC BLOOD PRESSURE: 130 MMHG | BODY MASS INDEX: 29.31 KG/M2 | HEART RATE: 64 BPM | WEIGHT: 187.17 LBS | DIASTOLIC BLOOD PRESSURE: 80 MMHG | OXYGEN SATURATION: 99 %

## 2023-02-23 DIAGNOSIS — T82.855D STENOSIS OF CORONARY ARTERY STENT, SUBSEQUENT ENCOUNTER: Primary | ICD-10-CM

## 2023-02-27 RX ORDER — MONTELUKAST SODIUM 10 MG/1
10 TABLET ORAL NIGHTLY
Qty: 90 TABLET | Refills: 3 | Status: SHIPPED | OUTPATIENT
Start: 2023-02-27

## 2023-02-28 ENCOUNTER — OFFICE VISIT (OUTPATIENT)
Dept: CARDIAC REHAB | Facility: HOSPITAL | Age: 73
End: 2023-02-28

## 2023-02-28 VITALS
HEART RATE: 71 BPM | OXYGEN SATURATION: 96 % | WEIGHT: 188.05 LBS | DIASTOLIC BLOOD PRESSURE: 80 MMHG | SYSTOLIC BLOOD PRESSURE: 138 MMHG | BODY MASS INDEX: 29.45 KG/M2

## 2023-02-28 DIAGNOSIS — T82.855D STENOSIS OF CORONARY ARTERY STENT, SUBSEQUENT ENCOUNTER: Primary | ICD-10-CM

## 2023-03-02 ENCOUNTER — OFFICE VISIT (OUTPATIENT)
Dept: CARDIAC REHAB | Facility: HOSPITAL | Age: 73
End: 2023-03-02

## 2023-03-02 VITALS
SYSTOLIC BLOOD PRESSURE: 120 MMHG | DIASTOLIC BLOOD PRESSURE: 70 MMHG | BODY MASS INDEX: 29 KG/M2 | HEART RATE: 59 BPM | WEIGHT: 185.19 LBS

## 2023-03-02 DIAGNOSIS — T82.855D STENOSIS OF CORONARY ARTERY STENT, SUBSEQUENT ENCOUNTER: Primary | ICD-10-CM

## 2023-03-07 ENCOUNTER — OFFICE VISIT (OUTPATIENT)
Dept: CARDIAC REHAB | Facility: HOSPITAL | Age: 73
End: 2023-03-07

## 2023-03-07 VITALS
SYSTOLIC BLOOD PRESSURE: 140 MMHG | HEART RATE: 56 BPM | OXYGEN SATURATION: 96 % | WEIGHT: 186.07 LBS | DIASTOLIC BLOOD PRESSURE: 80 MMHG | BODY MASS INDEX: 29.14 KG/M2

## 2023-03-07 DIAGNOSIS — T82.855D STENOSIS OF CORONARY ARTERY STENT, SUBSEQUENT ENCOUNTER: Primary | ICD-10-CM

## 2023-03-09 ENCOUNTER — OFFICE VISIT (OUTPATIENT)
Dept: CARDIAC REHAB | Facility: HOSPITAL | Age: 73
End: 2023-03-09

## 2023-03-09 VITALS
OXYGEN SATURATION: 96 % | BODY MASS INDEX: 28.96 KG/M2 | DIASTOLIC BLOOD PRESSURE: 78 MMHG | HEART RATE: 73 BPM | WEIGHT: 184.97 LBS | SYSTOLIC BLOOD PRESSURE: 138 MMHG

## 2023-03-09 DIAGNOSIS — T82.855D STENOSIS OF CORONARY ARTERY STENT, SUBSEQUENT ENCOUNTER: Primary | ICD-10-CM

## 2023-03-14 ENCOUNTER — OFFICE VISIT (OUTPATIENT)
Dept: CARDIAC REHAB | Facility: HOSPITAL | Age: 73
End: 2023-03-14

## 2023-03-14 VITALS
OXYGEN SATURATION: 97 % | HEART RATE: 68 BPM | SYSTOLIC BLOOD PRESSURE: 118 MMHG | BODY MASS INDEX: 29.17 KG/M2 | DIASTOLIC BLOOD PRESSURE: 72 MMHG | WEIGHT: 186.29 LBS

## 2023-03-14 DIAGNOSIS — T82.855D STENOSIS OF CORONARY ARTERY STENT, SUBSEQUENT ENCOUNTER: Primary | ICD-10-CM

## 2023-03-16 ENCOUNTER — TELEPHONE (OUTPATIENT)
Dept: GASTROENTEROLOGY | Facility: CLINIC | Age: 73
End: 2023-03-16
Payer: COMMERCIAL

## 2023-03-16 ENCOUNTER — OFFICE VISIT (OUTPATIENT)
Dept: CARDIAC REHAB | Facility: HOSPITAL | Age: 73
End: 2023-03-16

## 2023-03-16 VITALS
WEIGHT: 185.41 LBS | DIASTOLIC BLOOD PRESSURE: 74 MMHG | SYSTOLIC BLOOD PRESSURE: 114 MMHG | BODY MASS INDEX: 29.03 KG/M2 | OXYGEN SATURATION: 97 % | HEART RATE: 66 BPM

## 2023-03-16 DIAGNOSIS — T82.855D STENOSIS OF CORONARY ARTERY STENT, SUBSEQUENT ENCOUNTER: Primary | ICD-10-CM

## 2023-03-16 NOTE — TELEPHONE ENCOUNTER
I attempted to call Mr Pardo, wife stated he was not home.  We did not have a [facility] on file.  Asked to call office.  Will also send [facility] message too.      Reminder of his scheduled colonoscopy on 03.30.23, estimated arrival time of 11:30am.  Remind of liquid diet the day prior. Remind of bowel prep and instructions.  Hospital nurse will call to go over Rx;s and confirm time.  chanel

## 2023-03-21 ENCOUNTER — OFFICE VISIT (OUTPATIENT)
Dept: CARDIAC REHAB | Facility: HOSPITAL | Age: 73
End: 2023-03-21

## 2023-03-21 VITALS
HEART RATE: 59 BPM | BODY MASS INDEX: 29.45 KG/M2 | DIASTOLIC BLOOD PRESSURE: 82 MMHG | WEIGHT: 188.05 LBS | OXYGEN SATURATION: 97 % | SYSTOLIC BLOOD PRESSURE: 130 MMHG

## 2023-03-21 DIAGNOSIS — T82.855D STENOSIS OF CORONARY ARTERY STENT, SUBSEQUENT ENCOUNTER: Primary | ICD-10-CM

## 2023-03-23 ENCOUNTER — OFFICE VISIT (OUTPATIENT)
Dept: CARDIAC REHAB | Facility: HOSPITAL | Age: 73
End: 2023-03-23

## 2023-03-23 VITALS
SYSTOLIC BLOOD PRESSURE: 118 MMHG | HEART RATE: 61 BPM | WEIGHT: 186.07 LBS | BODY MASS INDEX: 29.14 KG/M2 | DIASTOLIC BLOOD PRESSURE: 72 MMHG | OXYGEN SATURATION: 97 %

## 2023-03-23 DIAGNOSIS — T82.855D STENOSIS OF CORONARY ARTERY STENT, SUBSEQUENT ENCOUNTER: Primary | ICD-10-CM

## 2023-03-28 ENCOUNTER — OFFICE VISIT (OUTPATIENT)
Dept: CARDIAC REHAB | Facility: HOSPITAL | Age: 73
End: 2023-03-28

## 2023-03-28 VITALS
HEART RATE: 61 BPM | WEIGHT: 188.49 LBS | SYSTOLIC BLOOD PRESSURE: 122 MMHG | DIASTOLIC BLOOD PRESSURE: 70 MMHG | OXYGEN SATURATION: 96 % | BODY MASS INDEX: 29.52 KG/M2

## 2023-03-28 DIAGNOSIS — T82.855D STENOSIS OF CORONARY ARTERY STENT, SUBSEQUENT ENCOUNTER: Primary | ICD-10-CM

## 2023-03-30 ENCOUNTER — ANESTHESIA EVENT (OUTPATIENT)
Dept: GASTROENTEROLOGY | Facility: HOSPITAL | Age: 73
End: 2023-03-30
Payer: COMMERCIAL

## 2023-03-30 ENCOUNTER — HOSPITAL ENCOUNTER (OUTPATIENT)
Facility: HOSPITAL | Age: 73
Setting detail: HOSPITAL OUTPATIENT SURGERY
Discharge: HOME | End: 2023-03-30
Attending: INTERNAL MEDICINE | Admitting: INTERNAL MEDICINE
Payer: COMMERCIAL

## 2023-03-30 ENCOUNTER — APPOINTMENT (OUTPATIENT)
Dept: CARDIAC REHAB | Facility: HOSPITAL | Age: 73
End: 2023-03-30

## 2023-03-30 ENCOUNTER — ANESTHESIA (OUTPATIENT)
Dept: GASTROENTEROLOGY | Facility: HOSPITAL | Age: 73
End: 2023-03-30
Payer: COMMERCIAL

## 2023-03-30 VITALS
HEART RATE: 73 BPM | TEMPERATURE: 97.8 F | BODY MASS INDEX: 28.72 KG/M2 | OXYGEN SATURATION: 96 % | RESPIRATION RATE: 16 BRPM | SYSTOLIC BLOOD PRESSURE: 125 MMHG | DIASTOLIC BLOOD PRESSURE: 67 MMHG | WEIGHT: 183.42 LBS

## 2023-03-30 DIAGNOSIS — Z86.010 HISTORY OF COLON POLYPS: ICD-10-CM

## 2023-03-30 PROCEDURE — 45385 COLONOSCOPY W/LESION REMOVAL: CPT | Performed by: INTERNAL MEDICINE

## 2023-03-30 PROCEDURE — 88305 TISSUE EXAM BY PATHOLOGIST: CPT | Performed by: INTERNAL MEDICINE

## 2023-03-30 PROCEDURE — 25010000002 PROPOFOL 10 MG/ML EMULSION: Performed by: NURSE ANESTHETIST, CERTIFIED REGISTERED

## 2023-03-30 PROCEDURE — 45380 COLONOSCOPY AND BIOPSY: CPT | Performed by: INTERNAL MEDICINE

## 2023-03-30 RX ORDER — PROPOFOL 10 MG/ML
VIAL (ML) INTRAVENOUS AS NEEDED
Status: DISCONTINUED | OUTPATIENT
Start: 2023-03-30 | End: 2023-03-30 | Stop reason: SURG

## 2023-03-30 RX ORDER — SODIUM CHLORIDE, SODIUM LACTATE, POTASSIUM CHLORIDE, CALCIUM CHLORIDE 600; 310; 30; 20 MG/100ML; MG/100ML; MG/100ML; MG/100ML
30 INJECTION, SOLUTION INTRAVENOUS CONTINUOUS
Status: DISCONTINUED | OUTPATIENT
Start: 2023-03-30 | End: 2023-03-30 | Stop reason: HOSPADM

## 2023-03-30 RX ORDER — LIDOCAINE HYDROCHLORIDE 20 MG/ML
INJECTION, SOLUTION EPIDURAL; INFILTRATION; INTRACAUDAL; PERINEURAL AS NEEDED
Status: DISCONTINUED | OUTPATIENT
Start: 2023-03-30 | End: 2023-03-30 | Stop reason: SURG

## 2023-03-30 RX ADMIN — LIDOCAINE HYDROCHLORIDE 100 MG: 20 INJECTION, SOLUTION EPIDURAL; INFILTRATION; INTRACAUDAL; PERINEURAL at 12:49

## 2023-03-30 RX ADMIN — PROPOFOL 200 MCG/KG/MIN: 10 INJECTION, EMULSION INTRAVENOUS at 12:49

## 2023-03-30 RX ADMIN — PROPOFOL 100 MG: 10 INJECTION, EMULSION INTRAVENOUS at 12:49

## 2023-03-30 RX ADMIN — SODIUM CHLORIDE, POTASSIUM CHLORIDE, SODIUM LACTATE AND CALCIUM CHLORIDE: 600; 310; 30; 20 INJECTION, SOLUTION INTRAVENOUS at 12:48

## 2023-03-30 NOTE — ANESTHESIA POSTPROCEDURE EVALUATION
Patient: Yann Pardo    Procedure Summary     Date: 03/30/23 Room / Location: [facility] ENDOSCOPY 2 / [facility] END[facility]    Anesthesia Start: 1248 Anesthesia Stop: 1318    Procedure: COLONOSCOPY with cold snare polypectomy and biopsy Diagnosis:       History of colon polyps      (History of colon polyps [Z86.010])    Surgeons: Kailash Orellana MD Provider: Prashant Bryant MD    Anesthesia Type: general ASA Status: 3          Anesthesia Type: general    Vitals  Vitals Value Taken Time   /67 03/30/23 1332   Temp 36.6 °C (97.8 °F) 03/30/23 1332   Pulse 70 03/30/23 1336   Resp 16 03/30/23 1332   SpO2 95 % 03/30/23 1336   Vitals shown include unvalidated device data.        Post Anesthesia Care and Evaluation    Patient location during evaluation: bedside  Patient participation: complete - patient participated  Level of consciousness: awake and alert  Pain management: adequate    Airway patency: patent  Anesthetic complications: No anesthetic complications  PONV Status: none  Cardiovascular status: acceptable  Respiratory status: acceptable  Hydration status: acceptable    Comments: An Anesthesiologist personally participated in the most demanding procedures (including induction and emergence if applicable) in the anesthesia plan, monitored the course of anesthesia administration at frequent intervals and remained physically present and available for immediate diagnosis and treatment of emergencies.

## 2023-03-30 NOTE — H&P
ScreeningPre Procedure History & Physical    Chief Complaint:   Screening     Subjective     HPI:   Screening     Past Medical History:   Past Medical History:   Diagnosis Date   • Allergic    • Asthma     MILD   • Bronchospasm     EXACERBATED BY ALERGIES   • Bronchospasm    • CAD (coronary artery disease)    • Colon polyp    • Diverticulosis    • Elevated PSA    • GERD (gastroesophageal reflux disease)    • Heart disease    • Hemorrhoids    • Hyperglycemia    • Hyperlipidemia    • Hypertension    • Lung collapse    • Mitral regurgitation     MILD-MODERATE   • Rib fracture    • Sinus bradycardia     ASYMPTOMATIC   • Sinus bradycardia    • Sinusitis    • Tubular adenoma     POLYPS        Past Surgical History:  Past Surgical History:   Procedure Laterality Date   • COLONOSCOPY      6/09 DIVERTIC., HEMORRHOIDS NEG. POLYPS, 3/15 2 TA POLYPS   • COLONOSCOPY     • CORONARY STENT PLACEMENT     • CORONARY STENT PLACEMENT      2005   • HERNIA REPAIR     • PORTACATH PLACEMENT     • PROSTATE BIOPSY      Transrectal       Family History:  Family History   Problem Relation Age of Onset   • Kidney cancer Father    • Coronary artery disease Other    • Colon cancer Neg Hx        Social History:   reports that he quit smoking about 50 years ago. His smoking use included cigarettes. He has a 2.00 pack-year smoking history. He quit smokeless tobacco use about 38 years ago.  His smokeless tobacco use included chew. He reports current alcohol use of about 6.0 standard drinks per week. He reports that he does not use drugs.    Medications:   Medications Prior to Admission   Medication Sig Dispense Refill Last Dose   • aspirin 81 MG EC tablet Take 1 tablet by mouth Daily.   Past Week   • levothyroxine (Synthroid) 25 MCG tablet Take 1 tablet by mouth Daily. 90 tablet 3 Past Week   • metoprolol succinate XL (TOPROL-XL) 25 MG 24 hr tablet Take 0.5 tablets by mouth Daily. 45 tablet 1 Past Week   • montelukast (SINGULAIR) 10 MG tablet TAKE 1  TABLET BY MOUTH EVERY NIGHT 90 tablet 3 Past Week   • rosuvastatin (CRESTOR) 40 MG tablet TAKE 1 TABLET BY MOUTH EVERY NIGHT AT BEDTIME 90 tablet 1 Past Week   • albuterol sulfate  (90 Base) MCG/ACT inhaler Inhale 2 puffs Every 4 (Four) Hours As Needed for Wheezing. 8.5 g 2    • nitroglycerin (NITROLINGUAL) 0.4 MG/SPRAY spray Place 1 spray under the tongue Every 5 (Five) Minutes As Needed for Chest Pain. 1 each 1    • Sod Picosulfate-Mag Ox-Cit Acd (Clenpiq) 10-3.5-12 MG-GM -GM/160ML solution Take 160 mL by mouth Take As Directed. 320 mL 0    • vitamin D (ERGOCALCIFEROL) 1.25 MG (42320 UT) capsule capsule Take 1 capsule by mouth 1 (One) Time Per Week. 12 capsule 0    • Vitamin D, Cholecalciferol, 50 MCG (2000 UT) capsule Take 1 capsule by mouth Daily. 90 capsule 0        Allergies:  Patient has no known allergies.        Objective     Weight 83.2 kg (183 lb 6.8 oz).    Physical Exam   Constitutional: Pt is oriented to person, place, and time and well-developed, well-nourished, and in no distress.   Mouth/Throat: Oropharynx is clear and moist.   Neck: Normal range of motion.   Cardiovascular: Normal rate, regular rhythm and normal heart sounds.    Pulmonary/Chest: Effort normal and breath sounds normal.   Abdominal: Soft. Nontender  Skin: Skin is warm and dry.   Psychiatric: Mood, memory, affect and judgment normal.     Assessment & Plan     Diagnosis:  Screening colonoscopy  H/o colon polyps     Anticipated Surgical Procedure:  colonoscopy    The risks, benefits, and alternatives of this procedure have been discussed with the patient or the responsible party- the patient understands and agrees to proceed.

## 2023-03-30 NOTE — ANESTHESIA PREPROCEDURE EVALUATION
Anesthesia Evaluation     Patient summary reviewed and Nursing notes reviewed   no history of anesthetic complications:  NPO Solid Status: > 8 hours  NPO Liquid Status: > 2 hours           Airway   Mallampati: II  TM distance: >3 FB  Neck ROM: full  No difficulty expected  Dental      Pulmonary - normal exam    breath sounds clear to auscultation  (+) a smoker Former, asthma,  Cardiovascular - normal exam  Exercise tolerance: good (4-7 METS)    Rhythm: regular  Rate: normal    (+) CAD,       Neuro/Psych- negative ROS  GI/Hepatic/Renal/Endo    (+)  GERD well controlled,  thyroid problem     Musculoskeletal (-) negative ROS    Abdominal    Substance History - negative use     OB/GYN negative ob/gyn ROS         Other - negative ROS       ROS/Med Hx Other: PAT Nursing Notes unavailable.                   Anesthesia Plan    ASA 3     general       Anesthetic plan, risks, benefits, and alternatives have been provided, discussed and informed consent has been obtained with: patient and spouse/significant other.        CODE STATUS:

## 2023-04-03 LAB
CYTO UR: NORMAL
LAB AP CASE REPORT: NORMAL
LAB AP CLINICAL INFORMATION: NORMAL
PATH REPORT.FINAL DX SPEC: NORMAL
PATH REPORT.GROSS SPEC: NORMAL

## 2023-04-04 ENCOUNTER — OFFICE VISIT (OUTPATIENT)
Dept: CARDIAC REHAB | Facility: HOSPITAL | Age: 73
End: 2023-04-04

## 2023-04-04 VITALS
WEIGHT: 188.05 LBS | HEART RATE: 59 BPM | DIASTOLIC BLOOD PRESSURE: 64 MMHG | OXYGEN SATURATION: 98 % | SYSTOLIC BLOOD PRESSURE: 108 MMHG | BODY MASS INDEX: 29.45 KG/M2

## 2023-04-04 DIAGNOSIS — T82.855D STENOSIS OF CORONARY ARTERY STENT, SUBSEQUENT ENCOUNTER: Primary | ICD-10-CM

## 2023-04-06 ENCOUNTER — OFFICE VISIT (OUTPATIENT)
Dept: CARDIAC REHAB | Facility: HOSPITAL | Age: 73
End: 2023-04-06

## 2023-04-06 VITALS
SYSTOLIC BLOOD PRESSURE: 112 MMHG | DIASTOLIC BLOOD PRESSURE: 68 MMHG | HEART RATE: 61 BPM | BODY MASS INDEX: 29.45 KG/M2 | OXYGEN SATURATION: 99 % | WEIGHT: 188.05 LBS

## 2023-04-06 DIAGNOSIS — T82.855D STENOSIS OF CORONARY ARTERY STENT, SUBSEQUENT ENCOUNTER: Primary | ICD-10-CM

## 2023-04-10 ENCOUNTER — TELEPHONE (OUTPATIENT)
Dept: UROLOGY | Facility: CLINIC | Age: 73
End: 2023-04-10
Payer: COMMERCIAL

## 2023-04-10 DIAGNOSIS — R97.20 ELEVATED PSA: Primary | ICD-10-CM

## 2023-04-10 NOTE — TELEPHONE ENCOUNTER
Spoke to pt spouse giving her MRI Prostate appt information. It will be at [facility] on 05/17/23 at 4:00pm, arrive at 3:30pm, no prep. Pt will follow up with us on 05/30/23 at 3:15pm to review MRI results. Pt spouse verbalized understanding via teach back and is aware I am mailing appt reminders to them for both appts.

## 2023-04-11 ENCOUNTER — OFFICE VISIT (OUTPATIENT)
Dept: CARDIAC REHAB | Facility: HOSPITAL | Age: 73
End: 2023-04-11

## 2023-04-11 DIAGNOSIS — T82.855D STENOSIS OF CORONARY ARTERY STENT, SUBSEQUENT ENCOUNTER: Primary | ICD-10-CM

## 2023-04-13 ENCOUNTER — OFFICE VISIT (OUTPATIENT)
Dept: CARDIAC REHAB | Facility: HOSPITAL | Age: 73
End: 2023-04-13

## 2023-04-13 VITALS
DIASTOLIC BLOOD PRESSURE: 80 MMHG | OXYGEN SATURATION: 96 % | SYSTOLIC BLOOD PRESSURE: 140 MMHG | WEIGHT: 187.61 LBS | BODY MASS INDEX: 29.38 KG/M2 | HEART RATE: 54 BPM

## 2023-04-13 DIAGNOSIS — T82.855D STENOSIS OF CORONARY ARTERY STENT, SUBSEQUENT ENCOUNTER: Primary | ICD-10-CM

## 2023-04-18 ENCOUNTER — OFFICE VISIT (OUTPATIENT)
Dept: CARDIAC REHAB | Facility: HOSPITAL | Age: 73
End: 2023-04-18

## 2023-04-18 DIAGNOSIS — T82.855D STENOSIS OF CORONARY ARTERY STENT, SUBSEQUENT ENCOUNTER: Primary | ICD-10-CM

## 2023-04-20 ENCOUNTER — OFFICE VISIT (OUTPATIENT)
Dept: CARDIAC REHAB | Facility: HOSPITAL | Age: 73
End: 2023-04-20

## 2023-04-20 VITALS
BODY MASS INDEX: 29.2 KG/M2 | OXYGEN SATURATION: 98 % | SYSTOLIC BLOOD PRESSURE: 160 MMHG | WEIGHT: 186.51 LBS | HEART RATE: 71 BPM | DIASTOLIC BLOOD PRESSURE: 90 MMHG

## 2023-04-20 DIAGNOSIS — T82.855D STENOSIS OF CORONARY ARTERY STENT, SUBSEQUENT ENCOUNTER: Primary | ICD-10-CM

## 2023-04-25 ENCOUNTER — OFFICE VISIT (OUTPATIENT)
Dept: CARDIAC REHAB | Facility: HOSPITAL | Age: 73
End: 2023-04-25

## 2023-04-25 VITALS
DIASTOLIC BLOOD PRESSURE: 70 MMHG | OXYGEN SATURATION: 99 % | BODY MASS INDEX: 29.58 KG/M2 | SYSTOLIC BLOOD PRESSURE: 120 MMHG | HEART RATE: 58 BPM | WEIGHT: 188.93 LBS

## 2023-04-25 DIAGNOSIS — T82.855D STENOSIS OF CORONARY ARTERY STENT, SUBSEQUENT ENCOUNTER: Primary | ICD-10-CM

## 2023-04-27 ENCOUNTER — OFFICE VISIT (OUTPATIENT)
Dept: CARDIAC REHAB | Facility: HOSPITAL | Age: 73
End: 2023-04-27

## 2023-04-27 VITALS
OXYGEN SATURATION: 97 % | BODY MASS INDEX: 29.45 KG/M2 | SYSTOLIC BLOOD PRESSURE: 112 MMHG | WEIGHT: 188.05 LBS | DIASTOLIC BLOOD PRESSURE: 78 MMHG | HEART RATE: 60 BPM

## 2023-04-27 DIAGNOSIS — T82.855D STENOSIS OF CORONARY ARTERY STENT, SUBSEQUENT ENCOUNTER: Primary | ICD-10-CM

## 2023-05-04 ENCOUNTER — TELEPHONE (OUTPATIENT)
Dept: INTERNAL MEDICINE | Facility: CLINIC | Age: 73
End: 2023-05-04
Payer: COMMERCIAL

## 2023-05-04 ENCOUNTER — OFFICE VISIT (OUTPATIENT)
Dept: CARDIAC REHAB | Facility: HOSPITAL | Age: 73
End: 2023-05-04

## 2023-05-04 VITALS
BODY MASS INDEX: 29.86 KG/M2 | SYSTOLIC BLOOD PRESSURE: 132 MMHG | HEART RATE: 58 BPM | WEIGHT: 190.7 LBS | DIASTOLIC BLOOD PRESSURE: 82 MMHG | OXYGEN SATURATION: 97 %

## 2023-05-04 DIAGNOSIS — E55.9 VITAMIN D DEFICIENCY: ICD-10-CM

## 2023-05-04 DIAGNOSIS — T82.855D STENOSIS OF CORONARY ARTERY STENT, SUBSEQUENT ENCOUNTER: Primary | ICD-10-CM

## 2023-05-04 RX ORDER — ERGOCALCIFEROL 1.25 MG/1
CAPSULE ORAL
Qty: 4 CAPSULE | Refills: 0 | Status: SHIPPED | OUTPATIENT
Start: 2023-05-04

## 2023-05-09 ENCOUNTER — OFFICE VISIT (OUTPATIENT)
Dept: CARDIAC REHAB | Facility: HOSPITAL | Age: 73
End: 2023-05-09

## 2023-05-09 VITALS
BODY MASS INDEX: 29.17 KG/M2 | WEIGHT: 186.29 LBS | DIASTOLIC BLOOD PRESSURE: 80 MMHG | OXYGEN SATURATION: 96 % | HEART RATE: 70 BPM | SYSTOLIC BLOOD PRESSURE: 118 MMHG

## 2023-05-09 DIAGNOSIS — T82.855D STENOSIS OF CORONARY ARTERY STENT, SUBSEQUENT ENCOUNTER: Primary | ICD-10-CM

## 2023-05-11 ENCOUNTER — OFFICE VISIT (OUTPATIENT)
Dept: CARDIAC REHAB | Facility: HOSPITAL | Age: 73
End: 2023-05-11

## 2023-05-11 VITALS
DIASTOLIC BLOOD PRESSURE: 80 MMHG | OXYGEN SATURATION: 97 % | WEIGHT: 187.39 LBS | BODY MASS INDEX: 29.34 KG/M2 | SYSTOLIC BLOOD PRESSURE: 158 MMHG | HEART RATE: 53 BPM

## 2023-05-11 DIAGNOSIS — T82.855D STENOSIS OF CORONARY ARTERY STENT, SUBSEQUENT ENCOUNTER: Primary | ICD-10-CM

## 2023-05-16 ENCOUNTER — OFFICE VISIT (OUTPATIENT)
Dept: CARDIAC REHAB | Facility: HOSPITAL | Age: 73
End: 2023-05-16

## 2023-05-16 DIAGNOSIS — T82.855D STENOSIS OF CORONARY ARTERY STENT, SUBSEQUENT ENCOUNTER: Primary | ICD-10-CM

## 2023-05-18 ENCOUNTER — OFFICE VISIT (OUTPATIENT)
Dept: CARDIAC REHAB | Facility: HOSPITAL | Age: 73
End: 2023-05-18

## 2023-05-18 VITALS
OXYGEN SATURATION: 95 % | BODY MASS INDEX: 29.2 KG/M2 | DIASTOLIC BLOOD PRESSURE: 80 MMHG | SYSTOLIC BLOOD PRESSURE: 150 MMHG | WEIGHT: 186.51 LBS | HEART RATE: 67 BPM

## 2023-05-18 DIAGNOSIS — T82.855D STENOSIS OF CORONARY ARTERY STENT, SUBSEQUENT ENCOUNTER: Primary | ICD-10-CM

## 2023-05-23 ENCOUNTER — OFFICE VISIT (OUTPATIENT)
Dept: CARDIAC REHAB | Facility: HOSPITAL | Age: 73
End: 2023-05-23

## 2023-05-23 VITALS
DIASTOLIC BLOOD PRESSURE: 62 MMHG | BODY MASS INDEX: 29.24 KG/M2 | OXYGEN SATURATION: 96 % | HEART RATE: 67 BPM | SYSTOLIC BLOOD PRESSURE: 110 MMHG | WEIGHT: 186.73 LBS

## 2023-05-23 DIAGNOSIS — T82.855D STENOSIS OF CORONARY ARTERY STENT, SUBSEQUENT ENCOUNTER: Primary | ICD-10-CM

## 2023-05-25 ENCOUNTER — OFFICE VISIT (OUTPATIENT)
Dept: CARDIAC REHAB | Facility: HOSPITAL | Age: 73
End: 2023-05-25

## 2023-05-25 VITALS
OXYGEN SATURATION: 97 % | WEIGHT: 184.53 LBS | BODY MASS INDEX: 28.89 KG/M2 | DIASTOLIC BLOOD PRESSURE: 70 MMHG | SYSTOLIC BLOOD PRESSURE: 120 MMHG | HEART RATE: 58 BPM

## 2023-05-25 DIAGNOSIS — T82.855D STENOSIS OF CORONARY ARTERY STENT, SUBSEQUENT ENCOUNTER: Primary | ICD-10-CM

## 2023-05-30 ENCOUNTER — OFFICE VISIT (OUTPATIENT)
Dept: CARDIAC REHAB | Facility: HOSPITAL | Age: 73
End: 2023-05-30

## 2023-05-30 VITALS
BODY MASS INDEX: 29.14 KG/M2 | HEART RATE: 56 BPM | OXYGEN SATURATION: 97 % | SYSTOLIC BLOOD PRESSURE: 136 MMHG | WEIGHT: 186.07 LBS | DIASTOLIC BLOOD PRESSURE: 86 MMHG

## 2023-05-30 DIAGNOSIS — T82.855D STENOSIS OF CORONARY ARTERY STENT, SUBSEQUENT ENCOUNTER: Primary | ICD-10-CM

## 2023-06-01 ENCOUNTER — OFFICE VISIT (OUTPATIENT)
Dept: CARDIAC REHAB | Facility: HOSPITAL | Age: 73
End: 2023-06-01

## 2023-06-01 VITALS
BODY MASS INDEX: 28.86 KG/M2 | SYSTOLIC BLOOD PRESSURE: 112 MMHG | DIASTOLIC BLOOD PRESSURE: 60 MMHG | WEIGHT: 184.3 LBS | HEART RATE: 70 BPM

## 2023-06-01 DIAGNOSIS — T82.855D STENOSIS OF CORONARY ARTERY STENT, SUBSEQUENT ENCOUNTER: Primary | ICD-10-CM

## 2023-06-06 ENCOUNTER — LAB (OUTPATIENT)
Dept: LAB | Facility: HOSPITAL | Age: 73
End: 2023-06-06
Payer: COMMERCIAL

## 2023-06-06 ENCOUNTER — OFFICE VISIT (OUTPATIENT)
Dept: CARDIAC REHAB | Facility: HOSPITAL | Age: 73
End: 2023-06-06

## 2023-06-06 VITALS
DIASTOLIC BLOOD PRESSURE: 70 MMHG | BODY MASS INDEX: 28.96 KG/M2 | HEART RATE: 61 BPM | SYSTOLIC BLOOD PRESSURE: 150 MMHG | WEIGHT: 184.97 LBS | OXYGEN SATURATION: 98 %

## 2023-06-06 DIAGNOSIS — T82.855D STENOSIS OF CORONARY ARTERY STENT, SUBSEQUENT ENCOUNTER: Primary | ICD-10-CM

## 2023-06-06 DIAGNOSIS — R97.20 ELEVATED PSA: ICD-10-CM

## 2023-06-06 LAB — PSA SERPL-MCNC: 8.77 NG/ML (ref 0–4)

## 2023-06-06 PROCEDURE — 36415 COLL VENOUS BLD VENIPUNCTURE: CPT

## 2023-06-06 PROCEDURE — 84153 ASSAY OF PSA TOTAL: CPT

## 2023-06-08 ENCOUNTER — OFFICE VISIT (OUTPATIENT)
Dept: CARDIAC REHAB | Facility: HOSPITAL | Age: 73
End: 2023-06-08

## 2023-06-08 VITALS
HEART RATE: 62 BPM | WEIGHT: 184.75 LBS | SYSTOLIC BLOOD PRESSURE: 148 MMHG | OXYGEN SATURATION: 96 % | DIASTOLIC BLOOD PRESSURE: 80 MMHG | BODY MASS INDEX: 28.93 KG/M2

## 2023-06-08 DIAGNOSIS — T82.855D STENOSIS OF CORONARY ARTERY STENT, SUBSEQUENT ENCOUNTER: Primary | ICD-10-CM

## 2023-06-11 NOTE — PROGRESS NOTES
Chief Complaint    Urologic complaint    Subjective          Yann Pardo presents to [facility] UROLOGY  History of Present Illness       73 year old  gentleman     Elevated PSA      Patient's had an MRI done at [facility] in the last few weeks.   We have been unable to get the read at this point secondary to either IT issues      Voiding ok.  No change       No GH.      No prostate meds      PVR       000    No urologic family history.  No family history of prostate cancer.    Mother  at 92 and father  at 86.    s/p cardiac stent.  Patient does not smoke. Baby aspirin daily.        PSA           8.7       2022 MRI prostate-60 g  PI-RADS 4-right peripheral zone 10 x 4 mm.  Stable from prior exam.  No extraprostatic extension.  Posterior marginal abutment.       7.3       7.2       11/15/2021 MRI prostate 65 g-PI-RADS 4 lesion right posterior lateral peripheral zone.  Stable in size.  11 mm      6.58    10/28/2020 MRI fusion prostate biopsy - right base focal high-grade PIN, negative otherwise    10/20 prostate MRI  49 g - lesion in the right posterior mid to base peripheral zone PIRADS 4 - 11 mm .  More indeterminate lesion in the anterior mid left peripheral zone 16mm, PIRADS 3      9.51  3/20 prostate biopsy - 49 g -  neg     6.89       5.4, percent free 20% (23%)     6.09  11/10  1.8  10/09  1.6      1.7            Past History:  Medical History: has a past medical history of Allergic, Asthma, Bronchospasm, Bronchospasm, CAD (coronary artery disease), Colon polyp, Diverticulosis, Elevated PSA, GERD (gastroesophageal reflux disease), Heart disease, Hemorrhoids, Hyperglycemia, Hyperlipidemia, Hypertension, Lung collapse, Mitral regurgitation, Rib fracture, Sinus bradycardia, Sinus bradycardia, Sinusitis, and Tubular adenoma.   Surgical History: has a past surgical history that includes Coronary stent placement; Colonoscopy;  Colonoscopy; Coronary stent placement; Hernia repair; Portacath placement; Prostate biopsy; and Colonoscopy (N/A, 3/30/2023).   Family History: family history includes Coronary artery disease in an other family member; Kidney cancer in his father.   Social History: reports that he quit smoking about 50 years ago. His smoking use included cigarettes. He has a 2.00 pack-year smoking history. He quit smokeless tobacco use about 38 years ago.  His smokeless tobacco use included chew. He reports current alcohol use of about 6.0 standard drinks per week. He reports that he does not use drugs.  Allergies: Patient has no known allergies.       Current Outpatient Medications:     albuterol sulfate  (90 Base) MCG/ACT inhaler, Inhale 2 puffs Every 4 (Four) Hours As Needed for Wheezing., Disp: 8.5 g, Rfl: 2    aspirin 81 MG EC tablet, Take 1 tablet by mouth Daily., Disp: , Rfl:     levothyroxine (Synthroid) 25 MCG tablet, Take 1 tablet by mouth Daily., Disp: 90 tablet, Rfl: 3    metoprolol succinate XL (TOPROL-XL) 25 MG 24 hr tablet, Take 0.5 tablets by mouth Daily., Disp: 45 tablet, Rfl: 1    montelukast (SINGULAIR) 10 MG tablet, TAKE 1 TABLET BY MOUTH EVERY NIGHT, Disp: 90 tablet, Rfl: 3    nitroglycerin (NITROLINGUAL) 0.4 MG/SPRAY spray, Place 1 spray under the tongue Every 5 (Five) Minutes As Needed for Chest Pain., Disp: 1 each, Rfl: 1    rosuvastatin (CRESTOR) 40 MG tablet, TAKE 1 TABLET BY MOUTH EVERY NIGHT AT BEDTIME, Disp: 90 tablet, Rfl: 1    Sod Picosulfate-Mag Ox-Cit Acd (Clenpiq) 10-3.5-12 MG-GM -GM/160ML solution, Take 160 mL by mouth Take As Directed., Disp: 320 mL, Rfl: 0    vitamin D (ERGOCALCIFEROL) 1.25 MG (18091 UT) capsule capsule, TAKE 1 CAPSULE BY MOUTH EVERY WEEK, Disp: 4 capsule, Rfl: 0    Vitamin D, Cholecalciferol, 50 MCG (2000 UT) capsule, Take 1 capsule by mouth Daily., Disp: 90 capsule, Rfl: 0            Objective     Vital Signs:   There were no vitals taken for this visit.              Assessment and Plan    Diagnoses and all orders for this visit:    1. Elevated PSA (Primary)      Elevated PSA      PSA a little higher, I am still waiting on read of the MRI.  We will go ahead and send task to get MRI read and I will have to discuss with patient over the phone.            Follow-up in 6 months with PSA

## 2023-06-13 ENCOUNTER — OFFICE VISIT (OUTPATIENT)
Dept: CARDIAC REHAB | Facility: HOSPITAL | Age: 73
End: 2023-06-13

## 2023-06-13 ENCOUNTER — OFFICE VISIT (OUTPATIENT)
Dept: UROLOGY | Facility: CLINIC | Age: 73
End: 2023-06-13
Payer: COMMERCIAL

## 2023-06-13 VITALS
OXYGEN SATURATION: 97 % | HEART RATE: 60 BPM | BODY MASS INDEX: 29.1 KG/M2 | WEIGHT: 185.85 LBS | SYSTOLIC BLOOD PRESSURE: 120 MMHG | DIASTOLIC BLOOD PRESSURE: 78 MMHG

## 2023-06-13 VITALS — WEIGHT: 190 LBS | BODY MASS INDEX: 29.82 KG/M2 | HEIGHT: 67 IN | RESPIRATION RATE: 16 BRPM

## 2023-06-13 DIAGNOSIS — R97.20 ELEVATED PSA: Primary | ICD-10-CM

## 2023-06-13 DIAGNOSIS — T82.855D STENOSIS OF CORONARY ARTERY STENT, SUBSEQUENT ENCOUNTER: Primary | ICD-10-CM

## 2023-06-15 ENCOUNTER — OFFICE VISIT (OUTPATIENT)
Dept: CARDIAC REHAB | Facility: HOSPITAL | Age: 73
End: 2023-06-15

## 2023-06-15 VITALS
BODY MASS INDEX: 29.04 KG/M2 | OXYGEN SATURATION: 96 % | HEART RATE: 61 BPM | SYSTOLIC BLOOD PRESSURE: 120 MMHG | DIASTOLIC BLOOD PRESSURE: 70 MMHG | WEIGHT: 185.41 LBS

## 2023-06-15 DIAGNOSIS — T82.855D STENOSIS OF CORONARY ARTERY STENT, SUBSEQUENT ENCOUNTER: Primary | ICD-10-CM

## 2023-06-16 DIAGNOSIS — E55.9 VITAMIN D DEFICIENCY: ICD-10-CM

## 2023-06-16 RX ORDER — ERGOCALCIFEROL 1.25 MG/1
CAPSULE ORAL
Qty: 4 CAPSULE | Refills: 0 | Status: SHIPPED | OUTPATIENT
Start: 2023-06-16

## 2023-06-16 RX ORDER — ROSUVASTATIN CALCIUM 40 MG/1
TABLET, COATED ORAL
Qty: 90 TABLET | Refills: 1 | Status: SHIPPED | OUTPATIENT
Start: 2023-06-16

## 2023-06-19 RX ORDER — LEVOTHYROXINE SODIUM 0.03 MG/1
25 TABLET ORAL DAILY
Qty: 90 TABLET | Refills: 1 | Status: SHIPPED | OUTPATIENT
Start: 2023-06-19

## 2023-06-19 NOTE — TELEPHONE ENCOUNTER
Rx Refill Note  Requested Prescriptions      No prescriptions requested or ordered in this encounter      Last office visit with prescribing clinician: 2/6/2023   Last telemedicine visit with prescribing clinician: Visit date not found   Next office visit with prescribing clinician: 8/10/2023                         Would you like a call back once the refill request has been completed: [] Yes [] No    If the office needs to give you a call back, can they leave a voicemail: [] Yes [] No    Ladi Flores MA  06/19/23, 15:37 EDT

## 2023-07-25 ENCOUNTER — OFFICE VISIT (OUTPATIENT)
Dept: CARDIAC REHAB | Facility: HOSPITAL | Age: 73
End: 2023-07-25

## 2023-07-25 VITALS
HEART RATE: 64 BPM | BODY MASS INDEX: 29.07 KG/M2 | OXYGEN SATURATION: 95 % | WEIGHT: 185.63 LBS | DIASTOLIC BLOOD PRESSURE: 80 MMHG | SYSTOLIC BLOOD PRESSURE: 140 MMHG

## 2023-07-25 DIAGNOSIS — T82.855D STENOSIS OF CORONARY ARTERY STENT, SUBSEQUENT ENCOUNTER: Primary | ICD-10-CM

## 2023-07-26 ENCOUNTER — TELEPHONE (OUTPATIENT)
Dept: UROLOGY | Facility: CLINIC | Age: 73
End: 2023-07-26
Payer: COMMERCIAL

## 2023-07-26 NOTE — TELEPHONE ENCOUNTER
Notified pt spouse of Dr Rubio orders for pt to follow up in Dec with repeat PSA. No repeat MRI needed prior to that appt. Spouse verbalized understanding. PSA order is in

## 2023-07-27 ENCOUNTER — OFFICE VISIT (OUTPATIENT)
Dept: CARDIAC REHAB | Facility: HOSPITAL | Age: 73
End: 2023-07-27

## 2023-07-27 VITALS
SYSTOLIC BLOOD PRESSURE: 120 MMHG | HEART RATE: 62 BPM | OXYGEN SATURATION: 96 % | BODY MASS INDEX: 28.76 KG/M2 | DIASTOLIC BLOOD PRESSURE: 62 MMHG | WEIGHT: 183.64 LBS

## 2023-07-27 DIAGNOSIS — T82.855D STENOSIS OF CORONARY ARTERY STENT, SUBSEQUENT ENCOUNTER: Primary | ICD-10-CM

## 2023-08-01 ENCOUNTER — OFFICE VISIT (OUTPATIENT)
Dept: CARDIAC REHAB | Facility: HOSPITAL | Age: 73
End: 2023-08-01

## 2023-08-01 VITALS
WEIGHT: 184.3 LBS | BODY MASS INDEX: 28.87 KG/M2 | OXYGEN SATURATION: 96 % | HEART RATE: 66 BPM | DIASTOLIC BLOOD PRESSURE: 70 MMHG | SYSTOLIC BLOOD PRESSURE: 110 MMHG

## 2023-08-01 DIAGNOSIS — T82.855D STENOSIS OF CORONARY ARTERY STENT, SUBSEQUENT ENCOUNTER: Primary | ICD-10-CM

## 2023-08-03 ENCOUNTER — OFFICE VISIT (OUTPATIENT)
Dept: CARDIAC REHAB | Facility: HOSPITAL | Age: 73
End: 2023-08-03

## 2023-08-03 VITALS
DIASTOLIC BLOOD PRESSURE: 80 MMHG | BODY MASS INDEX: 28.66 KG/M2 | HEART RATE: 65 BPM | OXYGEN SATURATION: 96 % | SYSTOLIC BLOOD PRESSURE: 130 MMHG | WEIGHT: 182.98 LBS

## 2023-08-03 DIAGNOSIS — T82.855D STENOSIS OF CORONARY ARTERY STENT, SUBSEQUENT ENCOUNTER: Primary | ICD-10-CM

## 2023-08-08 ENCOUNTER — OFFICE VISIT (OUTPATIENT)
Dept: CARDIAC REHAB | Facility: HOSPITAL | Age: 73
End: 2023-08-08

## 2023-08-08 VITALS
OXYGEN SATURATION: 96 % | SYSTOLIC BLOOD PRESSURE: 164 MMHG | WEIGHT: 186.07 LBS | DIASTOLIC BLOOD PRESSURE: 80 MMHG | BODY MASS INDEX: 29.14 KG/M2 | HEART RATE: 61 BPM

## 2023-08-08 DIAGNOSIS — T82.855D STENOSIS OF CORONARY ARTERY STENT, SUBSEQUENT ENCOUNTER: Primary | ICD-10-CM

## 2023-08-10 ENCOUNTER — OFFICE VISIT (OUTPATIENT)
Dept: CARDIAC REHAB | Facility: HOSPITAL | Age: 73
End: 2023-08-10

## 2023-08-10 ENCOUNTER — OFFICE VISIT (OUTPATIENT)
Dept: INTERNAL MEDICINE | Facility: CLINIC | Age: 73
End: 2023-08-10
Payer: COMMERCIAL

## 2023-08-10 VITALS
SYSTOLIC BLOOD PRESSURE: 130 MMHG | DIASTOLIC BLOOD PRESSURE: 60 MMHG | OXYGEN SATURATION: 96 % | WEIGHT: 186.07 LBS | BODY MASS INDEX: 29.14 KG/M2 | HEART RATE: 54 BPM

## 2023-08-10 VITALS
HEART RATE: 65 BPM | OXYGEN SATURATION: 97 % | DIASTOLIC BLOOD PRESSURE: 81 MMHG | WEIGHT: 191.2 LBS | BODY MASS INDEX: 30.01 KG/M2 | TEMPERATURE: 98 F | SYSTOLIC BLOOD PRESSURE: 158 MMHG | HEIGHT: 67 IN

## 2023-08-10 DIAGNOSIS — I25.10 CORONARY ARTERY DISEASE INVOLVING NATIVE CORONARY ARTERY OF NATIVE HEART WITHOUT ANGINA PECTORIS: ICD-10-CM

## 2023-08-10 DIAGNOSIS — I10 PRIMARY HYPERTENSION: ICD-10-CM

## 2023-08-10 DIAGNOSIS — T82.855D STENOSIS OF CORONARY ARTERY STENT, SUBSEQUENT ENCOUNTER: Primary | ICD-10-CM

## 2023-08-10 DIAGNOSIS — R73.01 IFG (IMPAIRED FASTING GLUCOSE): ICD-10-CM

## 2023-08-10 DIAGNOSIS — R97.20 ELEVATED PSA: ICD-10-CM

## 2023-08-10 DIAGNOSIS — Z95.5 S/P CORONARY ARTERY STENT PLACEMENT: ICD-10-CM

## 2023-08-10 DIAGNOSIS — I34.0 MITRAL VALVE INSUFFICIENCY, UNSPECIFIED ETIOLOGY: ICD-10-CM

## 2023-08-10 DIAGNOSIS — J45.20 MILD INTERMITTENT ASTHMA, UNSPECIFIED WHETHER COMPLICATED: ICD-10-CM

## 2023-08-10 DIAGNOSIS — E03.8 SUBCLINICAL HYPOTHYROIDISM: ICD-10-CM

## 2023-08-10 DIAGNOSIS — E55.9 VITAMIN D DEFICIENCY: Primary | ICD-10-CM

## 2023-08-10 DIAGNOSIS — R74.8 ELEVATED LIVER ENZYMES: ICD-10-CM

## 2023-08-10 DIAGNOSIS — Z86.010 HISTORY OF COLON POLYPS: ICD-10-CM

## 2023-08-10 PROCEDURE — 99214 OFFICE O/P EST MOD 30 MIN: CPT | Performed by: INTERNAL MEDICINE

## 2023-08-10 NOTE — PROGRESS NOTES
"CHIEF COMPLAINT/ HPI:  Hyperlipidemia and Follow-up (Patient is here for a routine follow up )    Patient is here to follow-up with coronary artery disease thyroid issues, thyroid condition and blood sugars, says he is staying active he works regularly is getting ready to go on a hunting trip, Dr. Rubio is following his PSA numbers on a regular basis,          Objective   Vital Signs  Vitals:    08/10/23 1527   BP: 158/81   Pulse: 65   Temp: 98 øF (36.7 øC)   SpO2: 97%   Weight: 86.7 kg (191 lb 3.2 oz)   Height: 170.2 cm (67.01\")      Body mass index is 29.94 kg/mý.  Review of Systems   Physical Exam  Constitutional:       General: He is not in acute distress.     Appearance: Normal appearance.   HENT:      Head: Normocephalic.      Mouth/Throat:      Mouth: Mucous membranes are moist.   Eyes:      Conjunctiva/sclera: Conjunctivae normal.      Pupils: Pupils are equal, round, and reactive to light.   Cardiovascular:      Rate and Rhythm: Normal rate and regular rhythm.      Pulses: Normal pulses.      Heart sounds: Normal heart sounds.   Pulmonary:      Effort: Pulmonary effort is normal.      Breath sounds: Normal breath sounds.   Abdominal:      General: Abdomen is flat. Bowel sounds are normal.      Palpations: Abdomen is soft.   Musculoskeletal:         General: No swelling. Normal range of motion.      Cervical back: Neck supple.   Skin:     General: Skin is warm and dry.      Coloration: Skin is not jaundiced.   Neurological:      General: No focal deficit present.      Mental Status: He is alert and oriented to person, place, and time. Mental status is at baseline.   Psychiatric:         Mood and Affect: Mood normal.         Behavior: Behavior normal.         Thought Content: Thought content normal.         Judgment: Judgment normal.      Result Review :   No results found for: PROBNP, BNP  CMP          2/3/2023    06:57   CMP   Glucose 123    BUN 17    Creatinine 1.24    EGFR 61.4    Sodium 145  "   Potassium 4.5    Chloride 109    Calcium 9.9    Total Protein 6.5    Albumin 4.3    Globulin 2.2    Total Bilirubin 0.2    Alkaline Phosphatase 64    AST (SGOT) 33    ALT (SGPT) 46    Albumin/Globulin Ratio 2.0    BUN/Creatinine Ratio 13.7    Anion Gap 6.9      CBC w/diff          2/3/2023    06:57   CBC w/Diff   WBC 5.43    RBC 4.90    Hemoglobin 14.5    Hematocrit 44.5    MCV 90.8    MCH 29.6    MCHC 32.6    RDW 11.9    Platelets 213    Neutrophil Rel % 56.7    Immature Granulocyte Rel % 0.4    Lymphocyte Rel % 29.3    Monocyte Rel % 8.8    Eosinophil Rel % 3.7    Basophil Rel % 1.1       Lipid Panel          2/3/2023    06:57   Lipid Panel   Total Cholesterol 163    Triglycerides 206    HDL Cholesterol 41    VLDL Cholesterol 35    LDL Cholesterol  87    LDL/HDL Ratio 1.97       Lab Results   Component Value Date    TSH 3.450 02/03/2023    TSH 4.960 (H) 07/08/2022    TSH 3.600 04/01/2022      Lab Results   Component Value Date    FREET4 1.11 02/03/2023    FREET4 1.15 07/08/2022    FREET4 1.23 04/01/2022      A1C Last 3 Results          2/3/2023    06:57   HGBA1C Last 3 Results   Hemoglobin A1C 6.30       PSA          11/22/2022    09:09 6/6/2023    06:51   PSA   PSA 7.370  8.770                     Visit Diagnoses:    ICD-10-CM ICD-9-CM   1. VIT D DEF  E55.9 268.9   2. Coronary artery disease involving native coronary artery of native heart without angina pectoris  I25.10 414.01   3. Subclinical hypothyroidism  E03.8 244.8   4. Mild intermittent asthma, unspecified whether complicated  J45.20 493.90   5. Primary hypertension  I10 401.9   6. Elevated PSA  R97.20 790.93   7. History of colon polyps  Z86.010 V12.72   8. S/P coronary artery stent placement  Z95.5 V45.82   9. Mitral valve insufficiency, unspecified etiology  I34.0 424.0   10. IFG  R73.01 790.21   11. Elevated LFT  R74.8 790.5       Assessment and Plan   Diagnoses and all orders for this visit:    1. VIT D DEF (Primary)  -     Vitamin D,25-Hydroxy;  Future  -     Hemoglobin A1c; Future  -     Comprehensive Metabolic Panel; Future  -     CBC & Differential; Future  -     Anti-Smooth Muscle Antibody Titer; Future  -     Hepatitis B Surface Antigen; Future  -     Ceruloplasmin; Future  -     Iron Profile; Future  -     Comprehensive Metabolic Panel; Future  -     CBC & Differential; Future  -     Lipid Panel; Future  -     Hemoglobin A1c; Future  -     TSH+Free T4; Future    2. Coronary artery disease involving native coronary artery of native heart without angina pectoris  -     Vitamin D,25-Hydroxy; Future  -     Hemoglobin A1c; Future  -     Comprehensive Metabolic Panel; Future  -     CBC & Differential; Future  -     Anti-Smooth Muscle Antibody Titer; Future  -     Hepatitis B Surface Antigen; Future  -     Ceruloplasmin; Future  -     Iron Profile; Future  -     Comprehensive Metabolic Panel; Future  -     CBC & Differential; Future  -     Lipid Panel; Future  -     Hemoglobin A1c; Future  -     TSH+Free T4; Future    3. Subclinical hypothyroidism  -     Vitamin D,25-Hydroxy; Future  -     Hemoglobin A1c; Future  -     Comprehensive Metabolic Panel; Future  -     CBC & Differential; Future  -     Anti-Smooth Muscle Antibody Titer; Future  -     Hepatitis B Surface Antigen; Future  -     Ceruloplasmin; Future  -     Iron Profile; Future  -     Comprehensive Metabolic Panel; Future  -     CBC & Differential; Future  -     Lipid Panel; Future  -     Hemoglobin A1c; Future  -     TSH+Free T4; Future    4. Mild intermittent asthma, unspecified whether complicated  -     Vitamin D,25-Hydroxy; Future  -     Hemoglobin A1c; Future  -     Comprehensive Metabolic Panel; Future  -     CBC & Differential; Future  -     Anti-Smooth Muscle Antibody Titer; Future  -     Hepatitis B Surface Antigen; Future  -     Ceruloplasmin; Future  -     Iron Profile; Future  -     Comprehensive Metabolic Panel; Future  -     CBC & Differential; Future  -     Lipid Panel; Future  -      Hemoglobin A1c; Future  -     TSH+Free T4; Future    5. Primary hypertension  -     Vitamin D,25-Hydroxy; Future  -     Hemoglobin A1c; Future  -     Comprehensive Metabolic Panel; Future  -     CBC & Differential; Future  -     Anti-Smooth Muscle Antibody Titer; Future  -     Hepatitis B Surface Antigen; Future  -     Ceruloplasmin; Future  -     Iron Profile; Future  -     Comprehensive Metabolic Panel; Future  -     CBC & Differential; Future  -     Lipid Panel; Future  -     Hemoglobin A1c; Future  -     TSH+Free T4; Future    6. Elevated PSA  -     Vitamin D,25-Hydroxy; Future  -     Hemoglobin A1c; Future  -     Comprehensive Metabolic Panel; Future  -     CBC & Differential; Future  -     Anti-Smooth Muscle Antibody Titer; Future  -     Hepatitis B Surface Antigen; Future  -     Ceruloplasmin; Future  -     Iron Profile; Future  -     Comprehensive Metabolic Panel; Future  -     CBC & Differential; Future  -     Lipid Panel; Future  -     Hemoglobin A1c; Future  -     TSH+Free T4; Future    7. History of colon polyps  -     Vitamin D,25-Hydroxy; Future  -     Hemoglobin A1c; Future  -     Comprehensive Metabolic Panel; Future  -     CBC & Differential; Future  -     Anti-Smooth Muscle Antibody Titer; Future  -     Hepatitis B Surface Antigen; Future  -     Ceruloplasmin; Future  -     Iron Profile; Future  -     Comprehensive Metabolic Panel; Future  -     CBC & Differential; Future  -     Lipid Panel; Future  -     Hemoglobin A1c; Future  -     TSH+Free T4; Future    8. S/P coronary artery stent placement  -     Vitamin D,25-Hydroxy; Future  -     Hemoglobin A1c; Future  -     Comprehensive Metabolic Panel; Future  -     CBC & Differential; Future  -     Anti-Smooth Muscle Antibody Titer; Future  -     Hepatitis B Surface Antigen; Future  -     Ceruloplasmin; Future  -     Iron Profile; Future  -     Comprehensive Metabolic Panel; Future  -     CBC & Differential; Future  -     Lipid Panel; Future  -      Hemoglobin A1c; Future  -     TSH+Free T4; Future    9. Mitral valve insufficiency, unspecified etiology  -     Vitamin D,25-Hydroxy; Future  -     Hemoglobin A1c; Future  -     Comprehensive Metabolic Panel; Future  -     CBC & Differential; Future  -     Anti-Smooth Muscle Antibody Titer; Future  -     Hepatitis B Surface Antigen; Future  -     Ceruloplasmin; Future  -     Iron Profile; Future  -     Comprehensive Metabolic Panel; Future  -     CBC & Differential; Future  -     Lipid Panel; Future  -     Hemoglobin A1c; Future  -     TSH+Free T4; Future    10. IFG  -     Vitamin D,25-Hydroxy; Future  -     Hemoglobin A1c; Future  -     Comprehensive Metabolic Panel; Future  -     CBC & Differential; Future  -     Anti-Smooth Muscle Antibody Titer; Future  -     Hepatitis B Surface Antigen; Future  -     Ceruloplasmin; Future  -     Iron Profile; Future  -     Comprehensive Metabolic Panel; Future  -     CBC & Differential; Future  -     Lipid Panel; Future  -     Hemoglobin A1c; Future  -     TSH+Free T4; Future    11. Elevated LFT  -     Anti-Smooth Muscle Antibody Titer; Future  -     Hepatitis B Surface Antigen; Future  -     Ceruloplasmin; Future  -     Iron Profile; Future  -     Comprehensive Metabolic Panel; Future  -     CBC & Differential; Future  -     Lipid Panel; Future  -     Hemoglobin A1c; Future  -     TSH+Free T4; Future             Coronary artery disease previous stent placement 2005 patient still goes to cardiac rehab,------ does follow-up with [facility] once a year,, continues Crestor 40 mg daily, metoprolol extended release 25 mg half a tablet daily aspirin 81 mg daily, patient exercises twice a week at cardiac rehab he is on an incline on treadmill going about 3.8 mph for 30 minutes, discussed August 2023    Elevated PSA -----follows up with Dr. Rubio --------had MRI November 2022,--- MRI shows a peer RADS 4 lesion within the right peripheral zone no change or stable, benign  prostatic hyperplasia ,  otherwise, has had several biopsies in the past,--mri prostate may 17, 2023 , stable ,     Impaired fasting glucose hemoglobin A1c  6.3, February 2023    creatinine up slightly at 1.25-1.31 stable February 2023,    Allergic rhinitis, allergies, uses over-the-counter antihistamines, Singulair 10 mg daily    Hypothyroidism, continues  levothyroxine 0.025 mg daily     Colonoscopy March 30, 2023 with Dr. Kailash Borges, 3 small polyps 1 in the ascending and 2 in the transverse all small, 2 of those polyps were tubular adenomas, follow-up again in 3 to 5 years    Elevated liver enzymes previously and stable ALT 46, previously been up in the past, as of February 2023, consider hepatitis panels, will order with current lab work August 10, 2023    Follow Up   Return in about 6 months (around 2/10/2024).  Patient was given instructions and counseling regarding his condition or for health maintenance advice. Please see specific information pulled into the AVS if appropriate.

## 2023-08-11 DIAGNOSIS — E55.9 VITAMIN D DEFICIENCY: ICD-10-CM

## 2023-08-11 RX ORDER — ERGOCALCIFEROL 1.25 MG/1
CAPSULE ORAL
Qty: 4 CAPSULE | Refills: 0 | Status: SHIPPED | OUTPATIENT
Start: 2023-08-11

## 2023-08-15 ENCOUNTER — OFFICE VISIT (OUTPATIENT)
Dept: CARDIAC REHAB | Facility: HOSPITAL | Age: 73
End: 2023-08-15

## 2023-08-15 VITALS
HEART RATE: 60 BPM | OXYGEN SATURATION: 95 % | BODY MASS INDEX: 29.27 KG/M2 | DIASTOLIC BLOOD PRESSURE: 80 MMHG | WEIGHT: 186.95 LBS | SYSTOLIC BLOOD PRESSURE: 160 MMHG

## 2023-08-15 DIAGNOSIS — T82.855D STENOSIS OF CORONARY ARTERY STENT, SUBSEQUENT ENCOUNTER: Primary | ICD-10-CM

## 2023-08-17 ENCOUNTER — OFFICE VISIT (OUTPATIENT)
Dept: CARDIAC REHAB | Facility: HOSPITAL | Age: 73
End: 2023-08-17

## 2023-08-17 VITALS
HEART RATE: 55 BPM | OXYGEN SATURATION: 97 % | SYSTOLIC BLOOD PRESSURE: 107 MMHG | DIASTOLIC BLOOD PRESSURE: 70 MMHG | BODY MASS INDEX: 29.03 KG/M2 | WEIGHT: 185.41 LBS

## 2023-08-17 DIAGNOSIS — T82.855D STENOSIS OF CORONARY ARTERY STENT, SUBSEQUENT ENCOUNTER: Primary | ICD-10-CM

## 2023-08-24 ENCOUNTER — OFFICE VISIT (OUTPATIENT)
Dept: CARDIAC REHAB | Facility: HOSPITAL | Age: 73
End: 2023-08-24

## 2023-08-24 VITALS
SYSTOLIC BLOOD PRESSURE: 120 MMHG | OXYGEN SATURATION: 96 % | DIASTOLIC BLOOD PRESSURE: 60 MMHG | WEIGHT: 184.75 LBS | HEART RATE: 67 BPM | BODY MASS INDEX: 28.93 KG/M2

## 2023-08-24 DIAGNOSIS — T82.855D STENOSIS OF CORONARY ARTERY STENT, SUBSEQUENT ENCOUNTER: Primary | ICD-10-CM

## 2023-08-29 ENCOUNTER — OFFICE VISIT (OUTPATIENT)
Dept: CARDIAC REHAB | Facility: HOSPITAL | Age: 73
End: 2023-08-29

## 2023-08-29 VITALS
BODY MASS INDEX: 29.17 KG/M2 | WEIGHT: 186.29 LBS | DIASTOLIC BLOOD PRESSURE: 78 MMHG | SYSTOLIC BLOOD PRESSURE: 120 MMHG | HEART RATE: 54 BPM | OXYGEN SATURATION: 97 %

## 2023-08-29 DIAGNOSIS — T82.855D STENOSIS OF CORONARY ARTERY STENT, SUBSEQUENT ENCOUNTER: Primary | ICD-10-CM

## 2023-08-31 ENCOUNTER — APPOINTMENT (OUTPATIENT)
Dept: CARDIAC REHAB | Facility: HOSPITAL | Age: 73
End: 2023-08-31

## 2023-09-05 ENCOUNTER — OFFICE VISIT (OUTPATIENT)
Dept: CARDIAC REHAB | Facility: HOSPITAL | Age: 73
End: 2023-09-05

## 2023-09-05 VITALS
HEART RATE: 54 BPM | OXYGEN SATURATION: 95 % | BODY MASS INDEX: 29.52 KG/M2 | WEIGHT: 188.49 LBS | DIASTOLIC BLOOD PRESSURE: 70 MMHG | SYSTOLIC BLOOD PRESSURE: 120 MMHG

## 2023-09-05 DIAGNOSIS — T82.855D STENOSIS OF CORONARY ARTERY STENT, SUBSEQUENT ENCOUNTER: Primary | ICD-10-CM

## 2023-09-07 ENCOUNTER — OFFICE VISIT (OUTPATIENT)
Dept: CARDIAC REHAB | Facility: HOSPITAL | Age: 73
End: 2023-09-07

## 2023-09-07 VITALS
DIASTOLIC BLOOD PRESSURE: 68 MMHG | HEART RATE: 65 BPM | BODY MASS INDEX: 29.03 KG/M2 | OXYGEN SATURATION: 95 % | SYSTOLIC BLOOD PRESSURE: 120 MMHG | WEIGHT: 185.41 LBS

## 2023-09-07 DIAGNOSIS — T82.855D STENOSIS OF CORONARY ARTERY STENT, SUBSEQUENT ENCOUNTER: Primary | ICD-10-CM

## 2023-09-11 DIAGNOSIS — E55.9 VITAMIN D DEFICIENCY: ICD-10-CM

## 2023-09-11 RX ORDER — ERGOCALCIFEROL 1.25 MG/1
CAPSULE ORAL
Qty: 4 CAPSULE | Refills: 0 | Status: SHIPPED | OUTPATIENT
Start: 2023-09-11

## 2023-09-12 ENCOUNTER — OFFICE VISIT (OUTPATIENT)
Dept: CARDIAC REHAB | Facility: HOSPITAL | Age: 73
End: 2023-09-12

## 2023-09-12 VITALS
WEIGHT: 185.85 LBS | DIASTOLIC BLOOD PRESSURE: 82 MMHG | HEART RATE: 58 BPM | SYSTOLIC BLOOD PRESSURE: 142 MMHG | BODY MASS INDEX: 29.1 KG/M2 | OXYGEN SATURATION: 95 %

## 2023-09-12 DIAGNOSIS — T82.855D STENOSIS OF CORONARY ARTERY STENT, SUBSEQUENT ENCOUNTER: Primary | ICD-10-CM

## 2023-09-14 ENCOUNTER — OFFICE VISIT (OUTPATIENT)
Dept: CARDIAC REHAB | Facility: HOSPITAL | Age: 73
End: 2023-09-14

## 2023-09-14 VITALS
HEART RATE: 59 BPM | DIASTOLIC BLOOD PRESSURE: 70 MMHG | OXYGEN SATURATION: 97 % | BODY MASS INDEX: 28.82 KG/M2 | WEIGHT: 184.08 LBS | SYSTOLIC BLOOD PRESSURE: 142 MMHG

## 2023-09-14 DIAGNOSIS — T82.855D STENOSIS OF CORONARY ARTERY STENT, SUBSEQUENT ENCOUNTER: Primary | ICD-10-CM

## 2023-09-19 ENCOUNTER — APPOINTMENT (OUTPATIENT)
Dept: CARDIAC REHAB | Facility: HOSPITAL | Age: 73
End: 2023-09-19

## 2023-09-19 ENCOUNTER — OFFICE VISIT (OUTPATIENT)
Dept: CARDIAC REHAB | Facility: HOSPITAL | Age: 73
End: 2023-09-19

## 2023-09-19 VITALS
BODY MASS INDEX: 29.27 KG/M2 | WEIGHT: 186.95 LBS | SYSTOLIC BLOOD PRESSURE: 140 MMHG | OXYGEN SATURATION: 97 % | DIASTOLIC BLOOD PRESSURE: 80 MMHG | HEART RATE: 63 BPM

## 2023-09-19 DIAGNOSIS — T82.855D STENOSIS OF CORONARY ARTERY STENT, SUBSEQUENT ENCOUNTER: Primary | ICD-10-CM

## 2023-09-21 ENCOUNTER — OFFICE VISIT (OUTPATIENT)
Dept: CARDIAC REHAB | Facility: HOSPITAL | Age: 73
End: 2023-09-21

## 2023-09-21 VITALS
HEART RATE: 63 BPM | OXYGEN SATURATION: 96 % | BODY MASS INDEX: 28.86 KG/M2 | SYSTOLIC BLOOD PRESSURE: 120 MMHG | DIASTOLIC BLOOD PRESSURE: 74 MMHG | WEIGHT: 184.3 LBS

## 2023-09-21 DIAGNOSIS — T82.855D STENOSIS OF CORONARY ARTERY STENT, SUBSEQUENT ENCOUNTER: Primary | ICD-10-CM

## 2023-09-22 RX ORDER — METOPROLOL SUCCINATE 25 MG/1
TABLET, EXTENDED RELEASE ORAL
Qty: 45 TABLET | Refills: 1 | Status: SHIPPED | OUTPATIENT
Start: 2023-09-22

## 2023-09-26 ENCOUNTER — OFFICE VISIT (OUTPATIENT)
Dept: CARDIAC REHAB | Facility: HOSPITAL | Age: 73
End: 2023-09-26

## 2023-09-26 VITALS
BODY MASS INDEX: 29.07 KG/M2 | DIASTOLIC BLOOD PRESSURE: 80 MMHG | SYSTOLIC BLOOD PRESSURE: 132 MMHG | HEART RATE: 63 BPM | WEIGHT: 185.63 LBS | OXYGEN SATURATION: 99 %

## 2023-09-26 DIAGNOSIS — T82.855D STENOSIS OF CORONARY ARTERY STENT, SUBSEQUENT ENCOUNTER: Primary | ICD-10-CM

## 2023-09-28 ENCOUNTER — OFFICE VISIT (OUTPATIENT)
Dept: CARDIAC REHAB | Facility: HOSPITAL | Age: 73
End: 2023-09-28

## 2023-09-28 VITALS
SYSTOLIC BLOOD PRESSURE: 128 MMHG | HEART RATE: 62 BPM | WEIGHT: 184.97 LBS | OXYGEN SATURATION: 98 % | BODY MASS INDEX: 28.96 KG/M2 | DIASTOLIC BLOOD PRESSURE: 72 MMHG

## 2023-09-28 DIAGNOSIS — T82.855D STENOSIS OF CORONARY ARTERY STENT, SUBSEQUENT ENCOUNTER: Primary | ICD-10-CM

## 2023-10-03 ENCOUNTER — OFFICE VISIT (OUTPATIENT)
Dept: CARDIAC REHAB | Facility: HOSPITAL | Age: 73
End: 2023-10-03

## 2023-10-03 VITALS
DIASTOLIC BLOOD PRESSURE: 60 MMHG | WEIGHT: 186.07 LBS | OXYGEN SATURATION: 96 % | BODY MASS INDEX: 29.14 KG/M2 | SYSTOLIC BLOOD PRESSURE: 120 MMHG | HEART RATE: 58 BPM

## 2023-10-03 DIAGNOSIS — E55.9 VITAMIN D DEFICIENCY: ICD-10-CM

## 2023-10-03 DIAGNOSIS — T82.855D STENOSIS OF CORONARY ARTERY STENT, SUBSEQUENT ENCOUNTER: Primary | ICD-10-CM

## 2023-10-04 RX ORDER — ERGOCALCIFEROL 1.25 MG/1
CAPSULE ORAL
Qty: 4 CAPSULE | Refills: 3 | Status: SHIPPED | OUTPATIENT
Start: 2023-10-04

## 2023-10-05 ENCOUNTER — OFFICE VISIT (OUTPATIENT)
Dept: CARDIAC REHAB | Facility: HOSPITAL | Age: 73
End: 2023-10-05

## 2023-10-05 VITALS
HEART RATE: 59 BPM | DIASTOLIC BLOOD PRESSURE: 64 MMHG | OXYGEN SATURATION: 98 % | WEIGHT: 185.41 LBS | BODY MASS INDEX: 29.03 KG/M2 | SYSTOLIC BLOOD PRESSURE: 126 MMHG

## 2023-10-05 DIAGNOSIS — T82.855D STENOSIS OF CORONARY ARTERY STENT, SUBSEQUENT ENCOUNTER: Primary | ICD-10-CM

## 2023-10-09 ENCOUNTER — LAB (OUTPATIENT)
Dept: LAB | Facility: HOSPITAL | Age: 73
End: 2023-10-09
Payer: COMMERCIAL

## 2023-10-09 DIAGNOSIS — R74.8 ELEVATED LIVER ENZYMES: ICD-10-CM

## 2023-10-09 DIAGNOSIS — I34.0 MITRAL VALVE INSUFFICIENCY, UNSPECIFIED ETIOLOGY: ICD-10-CM

## 2023-10-09 DIAGNOSIS — I49.3 PVC (PREMATURE VENTRICULAR CONTRACTION): ICD-10-CM

## 2023-10-09 DIAGNOSIS — E03.8 SUBCLINICAL HYPOTHYROIDISM: ICD-10-CM

## 2023-10-09 DIAGNOSIS — J45.20 MILD INTERMITTENT ASTHMA, UNSPECIFIED WHETHER COMPLICATED: ICD-10-CM

## 2023-10-09 DIAGNOSIS — I10 PRIMARY HYPERTENSION: ICD-10-CM

## 2023-10-09 DIAGNOSIS — E78.00 PURE HYPERCHOLESTEROLEMIA: ICD-10-CM

## 2023-10-09 DIAGNOSIS — R97.20 ELEVATED PSA: ICD-10-CM

## 2023-10-09 DIAGNOSIS — Z86.010 HISTORY OF COLON POLYPS: ICD-10-CM

## 2023-10-09 DIAGNOSIS — R73.01 IFG (IMPAIRED FASTING GLUCOSE): ICD-10-CM

## 2023-10-09 DIAGNOSIS — I25.10 CORONARY ARTERY DISEASE INVOLVING NATIVE CORONARY ARTERY OF NATIVE HEART WITHOUT ANGINA PECTORIS: ICD-10-CM

## 2023-10-09 DIAGNOSIS — Z95.5 S/P CORONARY ARTERY STENT PLACEMENT: ICD-10-CM

## 2023-10-09 DIAGNOSIS — E55.9 VITAMIN D DEFICIENCY: ICD-10-CM

## 2023-10-09 LAB
25(OH)D3 SERPL-MCNC: 44.2 NG/ML (ref 30–100)
ALBUMIN SERPL-MCNC: 4 G/DL (ref 3.5–5.2)
ALBUMIN/GLOB SERPL: 1.9 G/DL
ALP SERPL-CCNC: 58 U/L (ref 39–117)
ALPHA1 GLOB MFR UR ELPH: 135 MG/DL (ref 90–200)
ALT SERPL W P-5'-P-CCNC: 36 U/L (ref 1–41)
ANION GAP SERPL CALCULATED.3IONS-SCNC: 11.5 MMOL/L (ref 5–15)
AST SERPL-CCNC: 29 U/L (ref 1–40)
BASOPHILS # BLD AUTO: 0.07 10*3/MM3 (ref 0–0.2)
BASOPHILS NFR BLD AUTO: 1.6 % (ref 0–1.5)
BILIRUB SERPL-MCNC: 0.2 MG/DL (ref 0–1.2)
BUN SERPL-MCNC: 15 MG/DL (ref 8–23)
BUN/CREAT SERPL: 12.3 (ref 7–25)
CALCIUM SPEC-SCNC: 8.8 MG/DL (ref 8.6–10.5)
CERULOPLASMIN SERPL-MCNC: 18 MG/DL (ref 16–31)
CHLORIDE SERPL-SCNC: 107 MMOL/L (ref 98–107)
CHOLEST SERPL-MCNC: 144 MG/DL (ref 0–200)
CO2 SERPL-SCNC: 24.5 MMOL/L (ref 22–29)
CREAT SERPL-MCNC: 1.22 MG/DL (ref 0.76–1.27)
DEPRECATED RDW RBC AUTO: 39.2 FL (ref 37–54)
EGFRCR SERPLBLD CKD-EPI 2021: 62.2 ML/MIN/1.73
EOSINOPHIL # BLD AUTO: 0.17 10*3/MM3 (ref 0–0.4)
EOSINOPHIL NFR BLD AUTO: 3.9 % (ref 0.3–6.2)
ERYTHROCYTE [DISTWIDTH] IN BLOOD BY AUTOMATED COUNT: 12.1 % (ref 12.3–15.4)
FERRITIN SERPL-MCNC: 97.9 NG/ML (ref 30–400)
GLOBULIN UR ELPH-MCNC: 2.1 GM/DL
GLUCOSE SERPL-MCNC: 106 MG/DL (ref 65–99)
HBA1C MFR BLD: 5.8 % (ref 4.8–5.6)
HBV SURFACE AG SERPL QL IA: NORMAL
HCT VFR BLD AUTO: 43.9 % (ref 37.5–51)
HCV AB SER DONR QL: NORMAL
HDLC SERPL-MCNC: 36 MG/DL (ref 40–60)
HGB BLD-MCNC: 14.9 G/DL (ref 13–17.7)
IMM GRANULOCYTES # BLD AUTO: 0.01 10*3/MM3 (ref 0–0.05)
IMM GRANULOCYTES NFR BLD AUTO: 0.2 % (ref 0–0.5)
IRON 24H UR-MRATE: 71 MCG/DL (ref 59–158)
IRON SATN MFR SERPL: 18 % (ref 20–50)
LDLC SERPL CALC-MCNC: 69 MG/DL (ref 0–100)
LDLC/HDLC SERPL: 1.67 {RATIO}
LYMPHOCYTES # BLD AUTO: 1.53 10*3/MM3 (ref 0.7–3.1)
LYMPHOCYTES NFR BLD AUTO: 34.8 % (ref 19.6–45.3)
MCH RBC QN AUTO: 30.7 PG (ref 26.6–33)
MCHC RBC AUTO-ENTMCNC: 33.9 G/DL (ref 31.5–35.7)
MCV RBC AUTO: 90.3 FL (ref 79–97)
MONOCYTES # BLD AUTO: 0.43 10*3/MM3 (ref 0.1–0.9)
MONOCYTES NFR BLD AUTO: 9.8 % (ref 5–12)
NEUTROPHILS NFR BLD AUTO: 2.19 10*3/MM3 (ref 1.7–7)
NEUTROPHILS NFR BLD AUTO: 49.7 % (ref 42.7–76)
NRBC BLD AUTO-RTO: 0 /100 WBC (ref 0–0.2)
PLATELET # BLD AUTO: 161 10*3/MM3 (ref 140–450)
PMV BLD AUTO: 10.1 FL (ref 6–12)
POTASSIUM SERPL-SCNC: 3.9 MMOL/L (ref 3.5–5.2)
PROT SERPL-MCNC: 6.1 G/DL (ref 6–8.5)
RBC # BLD AUTO: 4.86 10*6/MM3 (ref 4.14–5.8)
SODIUM SERPL-SCNC: 143 MMOL/L (ref 136–145)
T4 FREE SERPL-MCNC: 1.08 NG/DL (ref 0.93–1.7)
TIBC SERPL-MCNC: 398 MCG/DL (ref 298–536)
TRANSFERRIN SERPL-MCNC: 267 MG/DL (ref 200–360)
TRIGL SERPL-MCNC: 239 MG/DL (ref 0–150)
TSH SERPL DL<=0.05 MIU/L-ACNC: 3.52 UIU/ML (ref 0.27–4.2)
VLDLC SERPL-MCNC: 39 MG/DL (ref 5–40)
WBC NRBC COR # BLD: 4.4 10*3/MM3 (ref 3.4–10.8)

## 2023-10-09 PROCEDURE — 87340 HEPATITIS B SURFACE AG IA: CPT

## 2023-10-09 PROCEDURE — 36415 COLL VENOUS BLD VENIPUNCTURE: CPT

## 2023-10-09 PROCEDURE — 82390 ASSAY OF CERULOPLASMIN: CPT

## 2023-10-09 PROCEDURE — 80061 LIPID PANEL: CPT

## 2023-10-09 PROCEDURE — 84466 ASSAY OF TRANSFERRIN: CPT

## 2023-10-09 PROCEDURE — 84439 ASSAY OF FREE THYROXINE: CPT

## 2023-10-09 PROCEDURE — 86803 HEPATITIS C AB TEST: CPT

## 2023-10-09 PROCEDURE — 83036 HEMOGLOBIN GLYCOSYLATED A1C: CPT

## 2023-10-09 PROCEDURE — 86015 ACTIN ANTIBODY EACH: CPT

## 2023-10-09 PROCEDURE — 82306 VITAMIN D 25 HYDROXY: CPT

## 2023-10-09 PROCEDURE — 82103 ALPHA-1-ANTITRYPSIN TOTAL: CPT

## 2023-10-09 PROCEDURE — 83540 ASSAY OF IRON: CPT

## 2023-10-09 PROCEDURE — 82728 ASSAY OF FERRITIN: CPT

## 2023-10-09 PROCEDURE — 80050 GENERAL HEALTH PANEL: CPT

## 2023-10-10 LAB — SMA IGG SER-ACNC: 4 UNITS (ref 0–19)

## 2023-10-24 ENCOUNTER — OFFICE VISIT (OUTPATIENT)
Dept: CARDIAC REHAB | Facility: HOSPITAL | Age: 73
End: 2023-10-24

## 2023-10-24 VITALS
BODY MASS INDEX: 28.86 KG/M2 | DIASTOLIC BLOOD PRESSURE: 80 MMHG | SYSTOLIC BLOOD PRESSURE: 140 MMHG | WEIGHT: 184.3 LBS | HEART RATE: 66 BPM | OXYGEN SATURATION: 97 %

## 2023-10-24 DIAGNOSIS — T82.855D STENOSIS OF CORONARY ARTERY STENT, SUBSEQUENT ENCOUNTER: Primary | ICD-10-CM

## 2023-10-26 ENCOUNTER — OFFICE VISIT (OUTPATIENT)
Dept: CARDIAC REHAB | Facility: HOSPITAL | Age: 73
End: 2023-10-26

## 2023-10-26 VITALS
BODY MASS INDEX: 28.79 KG/M2 | OXYGEN SATURATION: 98 % | WEIGHT: 183.86 LBS | HEART RATE: 77 BPM | SYSTOLIC BLOOD PRESSURE: 108 MMHG | DIASTOLIC BLOOD PRESSURE: 80 MMHG

## 2023-10-26 DIAGNOSIS — T82.855D STENOSIS OF CORONARY ARTERY STENT, SUBSEQUENT ENCOUNTER: Primary | ICD-10-CM

## 2023-11-02 ENCOUNTER — OFFICE VISIT (OUTPATIENT)
Dept: CARDIAC REHAB | Facility: HOSPITAL | Age: 73
End: 2023-11-02

## 2023-11-02 VITALS
WEIGHT: 184.97 LBS | OXYGEN SATURATION: 98 % | HEART RATE: 65 BPM | DIASTOLIC BLOOD PRESSURE: 80 MMHG | SYSTOLIC BLOOD PRESSURE: 140 MMHG | BODY MASS INDEX: 28.96 KG/M2

## 2023-11-02 DIAGNOSIS — T82.855D STENOSIS OF CORONARY ARTERY STENT, SUBSEQUENT ENCOUNTER: Primary | ICD-10-CM

## 2023-11-07 ENCOUNTER — OFFICE VISIT (OUTPATIENT)
Dept: CARDIAC REHAB | Facility: HOSPITAL | Age: 73
End: 2023-11-07

## 2023-11-07 VITALS
WEIGHT: 186.73 LBS | OXYGEN SATURATION: 96 % | BODY MASS INDEX: 29.24 KG/M2 | HEART RATE: 64 BPM | DIASTOLIC BLOOD PRESSURE: 80 MMHG | SYSTOLIC BLOOD PRESSURE: 130 MMHG

## 2023-11-07 DIAGNOSIS — T82.855D STENOSIS OF CORONARY ARTERY STENT, SUBSEQUENT ENCOUNTER: Primary | ICD-10-CM

## 2023-11-09 ENCOUNTER — OFFICE VISIT (OUTPATIENT)
Dept: CARDIAC REHAB | Facility: HOSPITAL | Age: 73
End: 2023-11-09

## 2023-11-09 VITALS
OXYGEN SATURATION: 96 % | HEART RATE: 67 BPM | DIASTOLIC BLOOD PRESSURE: 80 MMHG | WEIGHT: 184.3 LBS | SYSTOLIC BLOOD PRESSURE: 120 MMHG | BODY MASS INDEX: 28.86 KG/M2

## 2023-11-09 DIAGNOSIS — T82.855D STENOSIS OF CORONARY ARTERY STENT, SUBSEQUENT ENCOUNTER: Primary | ICD-10-CM

## 2023-11-14 ENCOUNTER — OFFICE VISIT (OUTPATIENT)
Dept: CARDIAC REHAB | Facility: HOSPITAL | Age: 73
End: 2023-11-14

## 2023-11-14 VITALS
BODY MASS INDEX: 29.03 KG/M2 | OXYGEN SATURATION: 97 % | DIASTOLIC BLOOD PRESSURE: 74 MMHG | SYSTOLIC BLOOD PRESSURE: 120 MMHG | HEART RATE: 54 BPM | WEIGHT: 185.41 LBS

## 2023-11-14 DIAGNOSIS — T82.855D STENOSIS OF CORONARY ARTERY STENT, SUBSEQUENT ENCOUNTER: Primary | ICD-10-CM

## 2023-11-16 ENCOUNTER — OFFICE VISIT (OUTPATIENT)
Dept: CARDIAC REHAB | Facility: HOSPITAL | Age: 73
End: 2023-11-16

## 2023-11-16 DIAGNOSIS — T82.855D STENOSIS OF CORONARY ARTERY STENT, SUBSEQUENT ENCOUNTER: Primary | ICD-10-CM

## 2023-11-21 ENCOUNTER — APPOINTMENT (OUTPATIENT)
Dept: CARDIAC REHAB | Facility: HOSPITAL | Age: 73
End: 2023-11-21

## 2023-11-28 ENCOUNTER — OFFICE VISIT (OUTPATIENT)
Dept: CARDIAC REHAB | Facility: HOSPITAL | Age: 73
End: 2023-11-28

## 2023-11-28 VITALS
DIASTOLIC BLOOD PRESSURE: 70 MMHG | BODY MASS INDEX: 29.38 KG/M2 | OXYGEN SATURATION: 96 % | HEART RATE: 60 BPM | WEIGHT: 187.61 LBS | SYSTOLIC BLOOD PRESSURE: 126 MMHG

## 2023-11-28 DIAGNOSIS — T82.855D STENOSIS OF CORONARY ARTERY STENT, SUBSEQUENT ENCOUNTER: Primary | ICD-10-CM

## 2023-11-30 ENCOUNTER — OFFICE VISIT (OUTPATIENT)
Dept: CARDIAC REHAB | Facility: HOSPITAL | Age: 73
End: 2023-11-30

## 2023-11-30 VITALS
DIASTOLIC BLOOD PRESSURE: 70 MMHG | WEIGHT: 186.29 LBS | BODY MASS INDEX: 29.17 KG/M2 | SYSTOLIC BLOOD PRESSURE: 110 MMHG | OXYGEN SATURATION: 96 % | HEART RATE: 62 BPM

## 2023-11-30 DIAGNOSIS — T82.855D STENOSIS OF CORONARY ARTERY STENT, SUBSEQUENT ENCOUNTER: Primary | ICD-10-CM

## 2023-12-01 ENCOUNTER — LAB (OUTPATIENT)
Dept: LAB | Facility: HOSPITAL | Age: 73
End: 2023-12-01
Payer: COMMERCIAL

## 2023-12-01 DIAGNOSIS — R97.20 ELEVATED PSA: ICD-10-CM

## 2023-12-01 LAB — PSA SERPL-MCNC: 7.27 NG/ML (ref 0–4)

## 2023-12-01 PROCEDURE — 36415 COLL VENOUS BLD VENIPUNCTURE: CPT

## 2023-12-01 PROCEDURE — 84153 ASSAY OF PSA TOTAL: CPT

## 2023-12-05 ENCOUNTER — OFFICE VISIT (OUTPATIENT)
Dept: CARDIAC REHAB | Facility: HOSPITAL | Age: 73
End: 2023-12-05

## 2023-12-05 VITALS
WEIGHT: 190.26 LBS | OXYGEN SATURATION: 99 % | SYSTOLIC BLOOD PRESSURE: 132 MMHG | DIASTOLIC BLOOD PRESSURE: 82 MMHG | HEART RATE: 62 BPM | BODY MASS INDEX: 29.79 KG/M2

## 2023-12-05 DIAGNOSIS — T82.855D STENOSIS OF CORONARY ARTERY STENT, SUBSEQUENT ENCOUNTER: Primary | ICD-10-CM

## 2023-12-07 ENCOUNTER — TELEPHONE (OUTPATIENT)
Dept: UROLOGY | Facility: CLINIC | Age: 73
End: 2023-12-07
Payer: COMMERCIAL

## 2023-12-07 ENCOUNTER — OFFICE VISIT (OUTPATIENT)
Dept: CARDIAC REHAB | Facility: HOSPITAL | Age: 73
End: 2023-12-07

## 2023-12-07 VITALS
HEART RATE: 66 BPM | WEIGHT: 189.15 LBS | BODY MASS INDEX: 29.62 KG/M2 | DIASTOLIC BLOOD PRESSURE: 70 MMHG | OXYGEN SATURATION: 96 % | SYSTOLIC BLOOD PRESSURE: 120 MMHG

## 2023-12-07 DIAGNOSIS — T82.855D STENOSIS OF CORONARY ARTERY STENT, SUBSEQUENT ENCOUNTER: Primary | ICD-10-CM

## 2023-12-07 NOTE — TELEPHONE ENCOUNTER
CALLED PT TO MOVE APPT TIME ON 12/12 TO 2PM PER BERTIN    CALLED HOME NUMBER NO ANSWER, LMOM    CALLED MOBILE # AND SPOKE W/ PT WHO AGREED TO CHANGE APPT TIME.

## 2023-12-07 NOTE — TELEPHONE ENCOUNTER
----- Message from Eric Dueñas RN sent at 12/7/2023  8:36 AM EST -----  Can pt move appt time on 12/12 to 2:00pm?

## 2023-12-10 PROBLEM — C61 PROSTATE CANCER: Status: ACTIVE | Noted: 2023-12-10

## 2023-12-10 NOTE — PROGRESS NOTES
Chief Complaint    Urologic complaint    Subjective          Yann Pardo presents to [facility] UROLOGY  History of Present Illness       73 year old  gentleman     Elevated PSA      No major changes to his health.        Voiding ok.  No change       No GH/UTI    No prostate meds      PVR       000    No urologic family history.  No family history of prostate cancer.    Mother  at 92 and father  at 86.    s/p cardiac stent.  Patient does not smoke. Baby aspirin daily.        PSA          7.2    2023 MRI prostate - 57 g  PI-RADS 4 -11 mm, right posterior lateral mid to base peripheral zone lesion.  Stable for the last 3 years.  Chronic prostatitis          8.7       2022 MRI prostate-60 g  PI-RADS 4-right peripheral zone 10 x 4 mm.  Stable from prior exam.  No extraprostatic extension.  Posterior marginal abutment.       7.3       7.2       11/15/2021 MRI prostate 65 g-PI-RADS 4 lesion right posterior lateral peripheral zone.  Stable in size.  11 mm      6.58    10/28/2020 MRI fusion prostate biopsy - right base focal high-grade PIN, negative otherwise    10/20 prostate MRI  49 g - lesion in the right posterior mid to base peripheral zone PIRADS 4 - 11 mm .  More indeterminate lesion in the anterior mid left peripheral zone 16mm, PIRADS 3      9.51  3/20 prostate biopsy - 49 g -  neg     6.89       5.4, percent free 20% (23%)     6.09  11/10  1.8  10/09  1.6      1.7            Past History:  Medical History: has a past medical history of Allergic, Asthma, Bronchospasm, Bronchospasm, CAD (coronary artery disease), Colon polyp, Diverticulosis, Elevated PSA, GERD (gastroesophageal reflux disease), Heart disease, Hemorrhoids, Hyperglycemia, Hyperlipidemia, Hypertension, Lung collapse, Mitral regurgitation, Rib fracture, Sinus bradycardia, Sinus bradycardia, Sinusitis, and Tubular adenoma.   Surgical History: has a past  surgical history that includes Coronary stent placement; Colonoscopy; Colonoscopy; Coronary stent placement; Hernia repair; Portacath placement; Prostate biopsy; and Colonoscopy (N/A, 3/30/2023).   Family History: family history includes Coronary artery disease in an other family member; Kidney cancer in his father.   Social History: reports that he quit smoking about 50 years ago. His smoking use included cigarettes. He has a 2.00 pack-year smoking history. He quit smokeless tobacco use about 38 years ago.  His smokeless tobacco use included chew. He reports current alcohol use of about 6.0 standard drinks of alcohol per week. He reports that he does not use drugs.  Allergies: Patient has no known allergies.       Current Outpatient Medications:     albuterol sulfate  (90 Base) MCG/ACT inhaler, Inhale 2 puffs Every 4 (Four) Hours As Needed for Wheezing., Disp: 8.5 g, Rfl: 2    aspirin 81 MG EC tablet, Take 1 tablet by mouth Daily., Disp: , Rfl:     levothyroxine (Synthroid) 25 MCG tablet, Take 1 tablet by mouth Daily., Disp: 90 tablet, Rfl: 1    metoprolol succinate XL (TOPROL-XL) 25 MG 24 hr tablet, TAKE 1/2 TABLET BY MOUTH EVERY DAY, Disp: 45 tablet, Rfl: 1    montelukast (SINGULAIR) 10 MG tablet, TAKE 1 TABLET BY MOUTH EVERY NIGHT, Disp: 90 tablet, Rfl: 3    nitroglycerin (NITROLINGUAL) 0.4 MG/SPRAY spray, Place 1 spray under the tongue Every 5 (Five) Minutes As Needed for Chest Pain., Disp: 1 each, Rfl: 1    rosuvastatin (CRESTOR) 40 MG tablet, TAKE 1 TABLET BY MOUTH EVERY NIGHT AT BEDTIME, Disp: 90 tablet, Rfl: 1    vitamin D (ERGOCALCIFEROL) 1.25 MG (64153 UT) capsule capsule, TAKE 1 CAPSULE BY MOUTH EVERY WEEK, Disp: 4 capsule, Rfl: 3            Objective     Vital Signs:   There were no vitals taken for this visit.             Assessment and Plan    Diagnoses and all orders for this visit:    1. Elevated PSA (Primary)      Elevated PSA    PSA lower at  this time.  After discussion risk and benefits  we will defer biopsy at this time and continue to follow conservatively, follow-up in 6 months with a PSA and MRI prostate

## 2023-12-12 ENCOUNTER — OFFICE VISIT (OUTPATIENT)
Dept: CARDIAC REHAB | Facility: HOSPITAL | Age: 73
End: 2023-12-12

## 2023-12-12 ENCOUNTER — OFFICE VISIT (OUTPATIENT)
Dept: UROLOGY | Facility: CLINIC | Age: 73
End: 2023-12-12
Payer: COMMERCIAL

## 2023-12-12 VITALS
WEIGHT: 187.61 LBS | SYSTOLIC BLOOD PRESSURE: 120 MMHG | DIASTOLIC BLOOD PRESSURE: 80 MMHG | OXYGEN SATURATION: 97 % | BODY MASS INDEX: 29.38 KG/M2 | HEART RATE: 63 BPM

## 2023-12-12 VITALS — HEIGHT: 67 IN | BODY MASS INDEX: 29.35 KG/M2 | WEIGHT: 187 LBS

## 2023-12-12 DIAGNOSIS — R97.20 ELEVATED PSA: Primary | ICD-10-CM

## 2023-12-12 DIAGNOSIS — T82.855D STENOSIS OF CORONARY ARTERY STENT, SUBSEQUENT ENCOUNTER: Primary | ICD-10-CM

## 2023-12-12 PROCEDURE — 99213 OFFICE O/P EST LOW 20 MIN: CPT | Performed by: UROLOGY

## 2023-12-14 ENCOUNTER — OFFICE VISIT (OUTPATIENT)
Dept: CARDIAC REHAB | Facility: HOSPITAL | Age: 73
End: 2023-12-14

## 2023-12-14 VITALS
SYSTOLIC BLOOD PRESSURE: 140 MMHG | WEIGHT: 186.73 LBS | BODY MASS INDEX: 29.24 KG/M2 | OXYGEN SATURATION: 97 % | DIASTOLIC BLOOD PRESSURE: 70 MMHG | HEART RATE: 63 BPM

## 2023-12-14 DIAGNOSIS — T82.855D STENOSIS OF CORONARY ARTERY STENT, SUBSEQUENT ENCOUNTER: Primary | ICD-10-CM

## 2023-12-19 ENCOUNTER — OFFICE VISIT (OUTPATIENT)
Dept: CARDIAC REHAB | Facility: HOSPITAL | Age: 73
End: 2023-12-19

## 2023-12-19 VITALS
BODY MASS INDEX: 29.72 KG/M2 | OXYGEN SATURATION: 93 % | WEIGHT: 189.82 LBS | DIASTOLIC BLOOD PRESSURE: 80 MMHG | HEART RATE: 68 BPM | SYSTOLIC BLOOD PRESSURE: 140 MMHG

## 2023-12-19 DIAGNOSIS — T82.855D STENOSIS OF CORONARY ARTERY STENT, SUBSEQUENT ENCOUNTER: Primary | ICD-10-CM

## 2023-12-21 ENCOUNTER — OFFICE VISIT (OUTPATIENT)
Dept: CARDIAC REHAB | Facility: HOSPITAL | Age: 73
End: 2023-12-21

## 2023-12-21 VITALS
WEIGHT: 188.49 LBS | OXYGEN SATURATION: 95 % | BODY MASS INDEX: 29.51 KG/M2 | SYSTOLIC BLOOD PRESSURE: 120 MMHG | HEART RATE: 60 BPM | DIASTOLIC BLOOD PRESSURE: 70 MMHG

## 2023-12-21 DIAGNOSIS — T82.855D STENOSIS OF CORONARY ARTERY STENT, SUBSEQUENT ENCOUNTER: Primary | ICD-10-CM

## 2023-12-28 ENCOUNTER — OFFICE VISIT (OUTPATIENT)
Dept: CARDIAC REHAB | Facility: HOSPITAL | Age: 73
End: 2023-12-28

## 2023-12-28 VITALS
SYSTOLIC BLOOD PRESSURE: 150 MMHG | HEART RATE: 63 BPM | WEIGHT: 187.83 LBS | OXYGEN SATURATION: 96 % | BODY MASS INDEX: 29.41 KG/M2 | DIASTOLIC BLOOD PRESSURE: 80 MMHG

## 2023-12-28 DIAGNOSIS — T82.855D STENOSIS OF CORONARY ARTERY STENT, SUBSEQUENT ENCOUNTER: Primary | ICD-10-CM

## 2024-01-02 ENCOUNTER — OFFICE VISIT (OUTPATIENT)
Dept: CARDIAC REHAB | Facility: HOSPITAL | Age: 74
End: 2024-01-02

## 2024-01-02 VITALS
OXYGEN SATURATION: 96 % | HEART RATE: 62 BPM | BODY MASS INDEX: 29.79 KG/M2 | SYSTOLIC BLOOD PRESSURE: 140 MMHG | WEIGHT: 190.26 LBS | DIASTOLIC BLOOD PRESSURE: 80 MMHG

## 2024-01-02 DIAGNOSIS — T82.855D STENOSIS OF CORONARY ARTERY STENT, SUBSEQUENT ENCOUNTER: Primary | ICD-10-CM

## 2024-01-04 ENCOUNTER — OFFICE VISIT (OUTPATIENT)
Dept: CARDIAC REHAB | Facility: HOSPITAL | Age: 74
End: 2024-01-04

## 2024-01-04 VITALS
BODY MASS INDEX: 29.62 KG/M2 | HEART RATE: 67 BPM | SYSTOLIC BLOOD PRESSURE: 120 MMHG | OXYGEN SATURATION: 97 % | WEIGHT: 189.15 LBS | DIASTOLIC BLOOD PRESSURE: 70 MMHG

## 2024-01-04 DIAGNOSIS — T82.855D STENOSIS OF CORONARY ARTERY STENT, SUBSEQUENT ENCOUNTER: Primary | ICD-10-CM

## 2024-01-09 ENCOUNTER — OFFICE VISIT (OUTPATIENT)
Dept: CARDIAC REHAB | Facility: HOSPITAL | Age: 74
End: 2024-01-09

## 2024-01-09 VITALS
DIASTOLIC BLOOD PRESSURE: 72 MMHG | WEIGHT: 191.58 LBS | SYSTOLIC BLOOD PRESSURE: 142 MMHG | HEART RATE: 79 BPM | OXYGEN SATURATION: 98 % | BODY MASS INDEX: 30 KG/M2

## 2024-01-09 DIAGNOSIS — T82.855D STENOSIS OF CORONARY ARTERY STENT, SUBSEQUENT ENCOUNTER: Primary | ICD-10-CM

## 2024-01-11 ENCOUNTER — OFFICE VISIT (OUTPATIENT)
Dept: CARDIAC REHAB | Facility: HOSPITAL | Age: 74
End: 2024-01-11

## 2024-01-11 VITALS
HEART RATE: 80 BPM | OXYGEN SATURATION: 96 % | SYSTOLIC BLOOD PRESSURE: 120 MMHG | WEIGHT: 191.36 LBS | BODY MASS INDEX: 29.96 KG/M2 | DIASTOLIC BLOOD PRESSURE: 80 MMHG

## 2024-01-11 DIAGNOSIS — T82.855D STENOSIS OF CORONARY ARTERY STENT, SUBSEQUENT ENCOUNTER: Primary | ICD-10-CM

## 2024-01-16 ENCOUNTER — OFFICE VISIT (OUTPATIENT)
Dept: CARDIAC REHAB | Facility: HOSPITAL | Age: 74
End: 2024-01-16

## 2024-01-16 VITALS
WEIGHT: 192.46 LBS | DIASTOLIC BLOOD PRESSURE: 70 MMHG | SYSTOLIC BLOOD PRESSURE: 138 MMHG | OXYGEN SATURATION: 97 % | BODY MASS INDEX: 30.13 KG/M2 | HEART RATE: 70 BPM

## 2024-01-16 DIAGNOSIS — T82.855D STENOSIS OF CORONARY ARTERY STENT, SUBSEQUENT ENCOUNTER: Primary | ICD-10-CM

## 2024-01-16 NOTE — PROGRESS NOTES
Pt is calling back to check on the message below.  Please call [phone]  Wanted this as a high priority.   Tolerated exercise well.

## 2024-01-18 ENCOUNTER — OFFICE VISIT (OUTPATIENT)
Dept: CARDIAC REHAB | Facility: HOSPITAL | Age: 74
End: 2024-01-18

## 2024-01-18 VITALS
DIASTOLIC BLOOD PRESSURE: 75 MMHG | HEART RATE: 54 BPM | BODY MASS INDEX: 29.58 KG/M2 | OXYGEN SATURATION: 97 % | SYSTOLIC BLOOD PRESSURE: 118 MMHG | WEIGHT: 188.93 LBS

## 2024-01-18 DIAGNOSIS — T82.855D STENOSIS OF CORONARY ARTERY STENT, SUBSEQUENT ENCOUNTER: Primary | ICD-10-CM

## 2024-01-23 ENCOUNTER — OFFICE VISIT (OUTPATIENT)
Dept: CARDIAC REHAB | Facility: HOSPITAL | Age: 74
End: 2024-01-23

## 2024-01-23 VITALS
OXYGEN SATURATION: 96 % | HEART RATE: 69 BPM | SYSTOLIC BLOOD PRESSURE: 120 MMHG | DIASTOLIC BLOOD PRESSURE: 80 MMHG | BODY MASS INDEX: 29.65 KG/M2 | WEIGHT: 189.38 LBS

## 2024-01-23 DIAGNOSIS — T82.855D STENOSIS OF CORONARY ARTERY STENT, SUBSEQUENT ENCOUNTER: Primary | ICD-10-CM

## 2024-01-25 ENCOUNTER — OFFICE VISIT (OUTPATIENT)
Dept: CARDIAC REHAB | Facility: HOSPITAL | Age: 74
End: 2024-01-25

## 2024-01-25 VITALS
SYSTOLIC BLOOD PRESSURE: 124 MMHG | BODY MASS INDEX: 29.65 KG/M2 | WEIGHT: 189.38 LBS | OXYGEN SATURATION: 96 % | DIASTOLIC BLOOD PRESSURE: 76 MMHG | HEART RATE: 57 BPM

## 2024-01-25 DIAGNOSIS — T82.855D STENOSIS OF CORONARY ARTERY STENT, SUBSEQUENT ENCOUNTER: Primary | ICD-10-CM

## 2024-01-30 ENCOUNTER — APPOINTMENT (OUTPATIENT)
Dept: CARDIAC REHAB | Facility: HOSPITAL | Age: 74
End: 2024-01-30

## 2024-02-01 ENCOUNTER — OFFICE VISIT (OUTPATIENT)
Dept: CARDIAC REHAB | Facility: HOSPITAL | Age: 74
End: 2024-02-01

## 2024-02-01 VITALS
BODY MASS INDEX: 29.89 KG/M2 | DIASTOLIC BLOOD PRESSURE: 70 MMHG | WEIGHT: 190.92 LBS | OXYGEN SATURATION: 96 % | SYSTOLIC BLOOD PRESSURE: 120 MMHG | HEART RATE: 83 BPM

## 2024-02-01 DIAGNOSIS — T82.855D STENOSIS OF CORONARY ARTERY STENT, SUBSEQUENT ENCOUNTER: Primary | ICD-10-CM

## 2024-02-06 ENCOUNTER — OFFICE VISIT (OUTPATIENT)
Dept: CARDIAC REHAB | Facility: HOSPITAL | Age: 74
End: 2024-02-06

## 2024-02-06 VITALS
BODY MASS INDEX: 29.69 KG/M2 | SYSTOLIC BLOOD PRESSURE: 124 MMHG | DIASTOLIC BLOOD PRESSURE: 80 MMHG | HEART RATE: 61 BPM | OXYGEN SATURATION: 96 % | WEIGHT: 189.6 LBS

## 2024-02-06 DIAGNOSIS — T82.855D STENOSIS OF CORONARY ARTERY STENT, SUBSEQUENT ENCOUNTER: Primary | ICD-10-CM

## 2024-02-12 ENCOUNTER — OFFICE VISIT (OUTPATIENT)
Dept: INTERNAL MEDICINE | Facility: CLINIC | Age: 74
End: 2024-02-12
Payer: MEDICARE

## 2024-02-12 VITALS
HEIGHT: 67 IN | HEART RATE: 69 BPM | WEIGHT: 193 LBS | DIASTOLIC BLOOD PRESSURE: 71 MMHG | SYSTOLIC BLOOD PRESSURE: 137 MMHG | OXYGEN SATURATION: 96 % | BODY MASS INDEX: 30.29 KG/M2 | TEMPERATURE: 98.2 F

## 2024-02-12 DIAGNOSIS — I10 PRIMARY HYPERTENSION: ICD-10-CM

## 2024-02-12 DIAGNOSIS — I34.0 MITRAL VALVE INSUFFICIENCY, UNSPECIFIED ETIOLOGY: ICD-10-CM

## 2024-02-12 DIAGNOSIS — E78.2 MIXED HYPERLIPIDEMIA: ICD-10-CM

## 2024-02-12 DIAGNOSIS — Z95.5 S/P CORONARY ARTERY STENT PLACEMENT: Primary | ICD-10-CM

## 2024-02-12 DIAGNOSIS — I25.10 CORONARY ARTERY DISEASE INVOLVING NATIVE CORONARY ARTERY OF NATIVE HEART WITHOUT ANGINA PECTORIS: ICD-10-CM

## 2024-02-12 DIAGNOSIS — R73.01 IFG (IMPAIRED FASTING GLUCOSE): ICD-10-CM

## 2024-02-12 DIAGNOSIS — R97.20 ELEVATED PSA: ICD-10-CM

## 2024-02-12 DIAGNOSIS — E03.8 SUBCLINICAL HYPOTHYROIDISM: ICD-10-CM

## 2024-02-12 DIAGNOSIS — J45.20 MILD INTERMITTENT ASTHMA, UNSPECIFIED WHETHER COMPLICATED: ICD-10-CM

## 2024-02-12 DIAGNOSIS — E55.9 VITAMIN D DEFICIENCY: ICD-10-CM

## 2024-02-12 DIAGNOSIS — Z86.010 HISTORY OF COLON POLYPS: ICD-10-CM

## 2024-02-12 DIAGNOSIS — R74.8 ELEVATED LIVER ENZYMES: ICD-10-CM

## 2024-02-12 LAB
ALBUMIN SERPL-MCNC: 4.4 G/DL (ref 3.5–5.2)
ALBUMIN/GLOB SERPL: 2.4 G/DL
ALP SERPL-CCNC: 60 U/L (ref 39–117)
ALT SERPL W P-5'-P-CCNC: 38 U/L (ref 1–41)
ANION GAP SERPL CALCULATED.3IONS-SCNC: 8 MMOL/L (ref 5–15)
AST SERPL-CCNC: 34 U/L (ref 1–40)
BASOPHILS # BLD AUTO: 0.07 10*3/MM3 (ref 0–0.2)
BASOPHILS NFR BLD AUTO: 1.6 % (ref 0–1.5)
BILIRUB SERPL-MCNC: 0.2 MG/DL (ref 0–1.2)
BUN SERPL-MCNC: 14 MG/DL (ref 8–23)
BUN/CREAT SERPL: 10.1 (ref 7–25)
CALCIUM SPEC-SCNC: 9.7 MG/DL (ref 8.6–10.5)
CHLORIDE SERPL-SCNC: 107 MMOL/L (ref 98–107)
CHOLEST SERPL-MCNC: 145 MG/DL (ref 0–200)
CO2 SERPL-SCNC: 28 MMOL/L (ref 22–29)
CREAT SERPL-MCNC: 1.38 MG/DL (ref 0.76–1.27)
DEPRECATED RDW RBC AUTO: 39.9 FL (ref 37–54)
EGFRCR SERPLBLD CKD-EPI 2021: 53.7 ML/MIN/1.73
EOSINOPHIL # BLD AUTO: 0.07 10*3/MM3 (ref 0–0.4)
EOSINOPHIL NFR BLD AUTO: 1.6 % (ref 0.3–6.2)
ERYTHROCYTE [DISTWIDTH] IN BLOOD BY AUTOMATED COUNT: 12.1 % (ref 12.3–15.4)
GLOBULIN UR ELPH-MCNC: 1.8 GM/DL
GLUCOSE SERPL-MCNC: 67 MG/DL (ref 65–99)
HBA1C MFR BLD: 5.9 % (ref 4.8–5.6)
HCT VFR BLD AUTO: 44.4 % (ref 37.5–51)
HDLC SERPL-MCNC: 39 MG/DL (ref 40–60)
HGB BLD-MCNC: 15.2 G/DL (ref 13–17.7)
IMM GRANULOCYTES # BLD AUTO: 0.01 10*3/MM3 (ref 0–0.05)
IMM GRANULOCYTES NFR BLD AUTO: 0.2 % (ref 0–0.5)
LDLC SERPL CALC-MCNC: 65 MG/DL (ref 0–100)
LDLC/HDLC SERPL: 1.41 {RATIO}
LYMPHOCYTES # BLD AUTO: 1.53 10*3/MM3 (ref 0.7–3.1)
LYMPHOCYTES NFR BLD AUTO: 34.8 % (ref 19.6–45.3)
MCH RBC QN AUTO: 30.8 PG (ref 26.6–33)
MCHC RBC AUTO-ENTMCNC: 34.2 G/DL (ref 31.5–35.7)
MCV RBC AUTO: 90.1 FL (ref 79–97)
MONOCYTES # BLD AUTO: 0.46 10*3/MM3 (ref 0.1–0.9)
MONOCYTES NFR BLD AUTO: 10.5 % (ref 5–12)
NEUTROPHILS NFR BLD AUTO: 2.26 10*3/MM3 (ref 1.7–7)
NEUTROPHILS NFR BLD AUTO: 51.3 % (ref 42.7–76)
NRBC BLD AUTO-RTO: 0 /100 WBC (ref 0–0.2)
PLATELET # BLD AUTO: 171 10*3/MM3 (ref 140–450)
PMV BLD AUTO: 10.4 FL (ref 6–12)
POTASSIUM SERPL-SCNC: 4.6 MMOL/L (ref 3.5–5.2)
PROT SERPL-MCNC: 6.2 G/DL (ref 6–8.5)
RBC # BLD AUTO: 4.93 10*6/MM3 (ref 4.14–5.8)
SODIUM SERPL-SCNC: 143 MMOL/L (ref 136–145)
T4 FREE SERPL-MCNC: 1.04 NG/DL (ref 0.93–1.7)
TRIGL SERPL-MCNC: 256 MG/DL (ref 0–150)
TSH SERPL DL<=0.05 MIU/L-ACNC: 4.55 UIU/ML (ref 0.27–4.2)
VLDLC SERPL-MCNC: 41 MG/DL (ref 5–40)
WBC NRBC COR # BLD AUTO: 4.4 10*3/MM3 (ref 3.4–10.8)

## 2024-02-12 PROCEDURE — G2211 COMPLEX E/M VISIT ADD ON: HCPCS | Performed by: INTERNAL MEDICINE

## 2024-02-12 PROCEDURE — 84439 ASSAY OF FREE THYROXINE: CPT | Performed by: INTERNAL MEDICINE

## 2024-02-12 PROCEDURE — 1170F FXNL STATUS ASSESSED: CPT | Performed by: INTERNAL MEDICINE

## 2024-02-12 PROCEDURE — 36415 COLL VENOUS BLD VENIPUNCTURE: CPT | Performed by: INTERNAL MEDICINE

## 2024-02-12 PROCEDURE — 99214 OFFICE O/P EST MOD 30 MIN: CPT | Performed by: INTERNAL MEDICINE

## 2024-02-12 PROCEDURE — 85025 COMPLETE CBC W/AUTO DIFF WBC: CPT | Performed by: INTERNAL MEDICINE

## 2024-02-12 PROCEDURE — 80061 LIPID PANEL: CPT | Performed by: INTERNAL MEDICINE

## 2024-02-12 PROCEDURE — 83036 HEMOGLOBIN GLYCOSYLATED A1C: CPT | Performed by: INTERNAL MEDICINE

## 2024-02-12 PROCEDURE — 84443 ASSAY THYROID STIM HORMONE: CPT | Performed by: INTERNAL MEDICINE

## 2024-02-12 PROCEDURE — 3078F DIAST BP <80 MM HG: CPT | Performed by: INTERNAL MEDICINE

## 2024-02-12 PROCEDURE — 3075F SYST BP GE 130 - 139MM HG: CPT | Performed by: INTERNAL MEDICINE

## 2024-02-12 PROCEDURE — 80053 COMPREHEN METABOLIC PANEL: CPT | Performed by: INTERNAL MEDICINE

## 2024-02-12 RX ORDER — OMEPRAZOLE 40 MG/1
40 CAPSULE, DELAYED RELEASE ORAL DAILY
Qty: 90 CAPSULE | Refills: 3 | Status: SHIPPED | OUTPATIENT
Start: 2024-02-12

## 2024-02-13 ENCOUNTER — OFFICE VISIT (OUTPATIENT)
Dept: CARDIAC REHAB | Facility: HOSPITAL | Age: 74
End: 2024-02-13

## 2024-02-13 ENCOUNTER — TELEPHONE (OUTPATIENT)
Dept: INTERNAL MEDICINE | Facility: CLINIC | Age: 74
End: 2024-02-13
Payer: MEDICARE

## 2024-02-13 VITALS
OXYGEN SATURATION: 97 % | WEIGHT: 191.14 LBS | SYSTOLIC BLOOD PRESSURE: 120 MMHG | DIASTOLIC BLOOD PRESSURE: 80 MMHG | BODY MASS INDEX: 29.93 KG/M2 | HEART RATE: 58 BPM

## 2024-02-13 DIAGNOSIS — T82.855D STENOSIS OF CORONARY ARTERY STENT, SUBSEQUENT ENCOUNTER: Primary | ICD-10-CM

## 2024-02-13 RX ORDER — LEVOTHYROXINE SODIUM 0.05 MG/1
50 TABLET ORAL DAILY
Qty: 90 TABLET | Refills: 3 | Status: SHIPPED | OUTPATIENT
Start: 2024-02-13

## 2024-02-15 ENCOUNTER — OFFICE VISIT (OUTPATIENT)
Dept: CARDIAC REHAB | Facility: HOSPITAL | Age: 74
End: 2024-02-15

## 2024-02-15 VITALS
DIASTOLIC BLOOD PRESSURE: 68 MMHG | SYSTOLIC BLOOD PRESSURE: 110 MMHG | OXYGEN SATURATION: 94 % | HEART RATE: 61 BPM | BODY MASS INDEX: 29.89 KG/M2 | WEIGHT: 190.92 LBS

## 2024-02-15 DIAGNOSIS — T82.855D STENOSIS OF CORONARY ARTERY STENT, SUBSEQUENT ENCOUNTER: Primary | ICD-10-CM

## 2024-02-20 ENCOUNTER — OFFICE VISIT (OUTPATIENT)
Dept: CARDIAC REHAB | Facility: HOSPITAL | Age: 74
End: 2024-02-20

## 2024-02-20 VITALS
WEIGHT: 191.8 LBS | HEART RATE: 61 BPM | OXYGEN SATURATION: 97 % | SYSTOLIC BLOOD PRESSURE: 130 MMHG | BODY MASS INDEX: 30.03 KG/M2 | DIASTOLIC BLOOD PRESSURE: 80 MMHG

## 2024-02-20 DIAGNOSIS — T82.855D STENOSIS OF CORONARY ARTERY STENT, SUBSEQUENT ENCOUNTER: Primary | ICD-10-CM

## 2024-02-22 ENCOUNTER — APPOINTMENT (OUTPATIENT)
Dept: CARDIAC REHAB | Facility: HOSPITAL | Age: 74
End: 2024-02-22

## 2024-02-27 ENCOUNTER — OFFICE VISIT (OUTPATIENT)
Dept: CARDIAC REHAB | Facility: HOSPITAL | Age: 74
End: 2024-02-27

## 2024-02-27 VITALS
BODY MASS INDEX: 29.69 KG/M2 | DIASTOLIC BLOOD PRESSURE: 80 MMHG | OXYGEN SATURATION: 96 % | HEART RATE: 66 BPM | SYSTOLIC BLOOD PRESSURE: 150 MMHG | WEIGHT: 189.6 LBS

## 2024-02-27 DIAGNOSIS — T82.855D STENOSIS OF CORONARY ARTERY STENT, SUBSEQUENT ENCOUNTER: Primary | ICD-10-CM

## 2024-02-28 RX ORDER — ROSUVASTATIN CALCIUM 40 MG/1
TABLET, COATED ORAL
Qty: 90 TABLET | Refills: 1 | Status: SHIPPED | OUTPATIENT
Start: 2024-02-28

## 2024-02-29 ENCOUNTER — OFFICE VISIT (OUTPATIENT)
Dept: CARDIAC REHAB | Facility: HOSPITAL | Age: 74
End: 2024-02-29

## 2024-02-29 VITALS
RESPIRATION RATE: 18 BRPM | DIASTOLIC BLOOD PRESSURE: 80 MMHG | SYSTOLIC BLOOD PRESSURE: 122 MMHG | BODY MASS INDEX: 29.38 KG/M2 | WEIGHT: 187.61 LBS | HEART RATE: 68 BPM | OXYGEN SATURATION: 97 %

## 2024-02-29 DIAGNOSIS — T82.855D STENOSIS OF CORONARY ARTERY STENT, SUBSEQUENT ENCOUNTER: Primary | ICD-10-CM

## 2024-03-07 ENCOUNTER — OFFICE VISIT (OUTPATIENT)
Dept: CARDIAC REHAB | Facility: HOSPITAL | Age: 74
End: 2024-03-07

## 2024-03-07 VITALS
HEART RATE: 64 BPM | DIASTOLIC BLOOD PRESSURE: 80 MMHG | SYSTOLIC BLOOD PRESSURE: 138 MMHG | BODY MASS INDEX: 28.43 KG/M2 | WEIGHT: 186.95 LBS

## 2024-03-07 DIAGNOSIS — T82.855D STENOSIS OF CORONARY ARTERY STENT, SUBSEQUENT ENCOUNTER: Primary | ICD-10-CM

## 2024-03-12 ENCOUNTER — TELEPHONE (OUTPATIENT)
Dept: UROLOGY | Facility: CLINIC | Age: 74
End: 2024-03-12
Payer: MEDICARE

## 2024-03-12 ENCOUNTER — OFFICE VISIT (OUTPATIENT)
Dept: CARDIAC REHAB | Facility: HOSPITAL | Age: 74
End: 2024-03-12

## 2024-03-12 VITALS
WEIGHT: 190.26 LBS | OXYGEN SATURATION: 97 % | HEART RATE: 63 BPM | BODY MASS INDEX: 28.93 KG/M2 | DIASTOLIC BLOOD PRESSURE: 70 MMHG | SYSTOLIC BLOOD PRESSURE: 120 MMHG

## 2024-03-12 DIAGNOSIS — T82.855D STENOSIS OF CORONARY ARTERY STENT, SUBSEQUENT ENCOUNTER: Primary | ICD-10-CM

## 2024-03-12 NOTE — TELEPHONE ENCOUNTER
Called pt to let him know we will schedule his MRI Prostate with [facility] as soon as we are able and we will let him know. He verbalized understanding and says he would like to go to the same facility he went to previously.

## 2024-03-12 NOTE — TELEPHONE ENCOUNTER
Caller: Yann Pardo    Relationship: Self    Best call back number: [phone]    What orders are you requesting (i.e. lab or imaging): MRI    In what timeframe would the patient need to come in: N/A    Where will you receive your lab/imaging services: [facility]    Additional notes: RECEIVED A CALL FROM PT. HE CALLED REQUESTING MRI ORDERS TO BE SENT TO [facility] LIKE HE HAS DONE IN THE PAST. PT WANTED TO KNOW HOW WILL HE KNOW TO GET SCHEDULE WITH [facility] ONCE THE ORDERS HAVE BEEN PLACED?

## 2024-03-14 ENCOUNTER — OFFICE VISIT (OUTPATIENT)
Dept: CARDIAC REHAB | Facility: HOSPITAL | Age: 74
End: 2024-03-14

## 2024-03-14 VITALS
BODY MASS INDEX: 28.56 KG/M2 | WEIGHT: 187.83 LBS | SYSTOLIC BLOOD PRESSURE: 130 MMHG | DIASTOLIC BLOOD PRESSURE: 80 MMHG | OXYGEN SATURATION: 95 % | HEART RATE: 59 BPM

## 2024-03-14 DIAGNOSIS — T82.855D STENOSIS OF CORONARY ARTERY STENT, SUBSEQUENT ENCOUNTER: Primary | ICD-10-CM

## 2024-03-19 ENCOUNTER — OFFICE VISIT (OUTPATIENT)
Dept: CARDIAC REHAB | Facility: HOSPITAL | Age: 74
End: 2024-03-19

## 2024-03-19 VITALS
OXYGEN SATURATION: 98 % | HEART RATE: 65 BPM | SYSTOLIC BLOOD PRESSURE: 138 MMHG | DIASTOLIC BLOOD PRESSURE: 80 MMHG | WEIGHT: 191.36 LBS | BODY MASS INDEX: 29.1 KG/M2

## 2024-03-19 DIAGNOSIS — T82.855D STENOSIS OF CORONARY ARTERY STENT, SUBSEQUENT ENCOUNTER: Primary | ICD-10-CM

## 2024-03-21 ENCOUNTER — OFFICE VISIT (OUTPATIENT)
Dept: CARDIAC REHAB | Facility: HOSPITAL | Age: 74
End: 2024-03-21

## 2024-03-21 VITALS
SYSTOLIC BLOOD PRESSURE: 126 MMHG | DIASTOLIC BLOOD PRESSURE: 66 MMHG | OXYGEN SATURATION: 98 % | WEIGHT: 189.6 LBS | BODY MASS INDEX: 28.83 KG/M2

## 2024-03-21 DIAGNOSIS — T82.855D STENOSIS OF CORONARY ARTERY STENT, SUBSEQUENT ENCOUNTER: Primary | ICD-10-CM

## 2024-03-26 ENCOUNTER — OFFICE VISIT (OUTPATIENT)
Dept: CARDIAC REHAB | Facility: HOSPITAL | Age: 74
End: 2024-03-26

## 2024-03-26 VITALS
WEIGHT: 188.49 LBS | OXYGEN SATURATION: 95 % | DIASTOLIC BLOOD PRESSURE: 80 MMHG | BODY MASS INDEX: 28.66 KG/M2 | SYSTOLIC BLOOD PRESSURE: 140 MMHG | HEART RATE: 60 BPM

## 2024-03-26 DIAGNOSIS — T82.855D STENOSIS OF CORONARY ARTERY STENT, SUBSEQUENT ENCOUNTER: Primary | ICD-10-CM

## 2024-03-28 ENCOUNTER — OFFICE VISIT (OUTPATIENT)
Dept: CARDIAC REHAB | Facility: HOSPITAL | Age: 74
End: 2024-03-28

## 2024-03-28 VITALS
WEIGHT: 188.27 LBS | DIASTOLIC BLOOD PRESSURE: 62 MMHG | HEART RATE: 59 BPM | SYSTOLIC BLOOD PRESSURE: 102 MMHG | OXYGEN SATURATION: 97 % | BODY MASS INDEX: 28.63 KG/M2

## 2024-03-28 DIAGNOSIS — T82.855D STENOSIS OF CORONARY ARTERY STENT, SUBSEQUENT ENCOUNTER: Primary | ICD-10-CM

## 2024-04-02 ENCOUNTER — OFFICE VISIT (OUTPATIENT)
Dept: CARDIAC REHAB | Facility: HOSPITAL | Age: 74
End: 2024-04-02

## 2024-04-02 VITALS
DIASTOLIC BLOOD PRESSURE: 72 MMHG | HEART RATE: 54 BPM | SYSTOLIC BLOOD PRESSURE: 130 MMHG | OXYGEN SATURATION: 97 % | WEIGHT: 189.38 LBS | BODY MASS INDEX: 28.79 KG/M2

## 2024-04-02 DIAGNOSIS — T82.855D STENOSIS OF CORONARY ARTERY STENT, SUBSEQUENT ENCOUNTER: Primary | ICD-10-CM

## 2024-04-04 ENCOUNTER — OFFICE VISIT (OUTPATIENT)
Dept: CARDIAC REHAB | Facility: HOSPITAL | Age: 74
End: 2024-04-04

## 2024-04-04 VITALS
DIASTOLIC BLOOD PRESSURE: 80 MMHG | BODY MASS INDEX: 28.69 KG/M2 | WEIGHT: 188.71 LBS | OXYGEN SATURATION: 96 % | HEART RATE: 60 BPM | SYSTOLIC BLOOD PRESSURE: 124 MMHG

## 2024-04-04 DIAGNOSIS — T82.855D STENOSIS OF CORONARY ARTERY STENT, SUBSEQUENT ENCOUNTER: Primary | ICD-10-CM

## 2024-04-05 ENCOUNTER — TELEPHONE (OUTPATIENT)
Dept: UROLOGY | Facility: CLINIC | Age: 74
End: 2024-04-05
Payer: MEDICARE

## 2024-04-05 NOTE — TELEPHONE ENCOUNTER
Spoke to patient and advised him of appt date and time of 5-25-24 with arrival of 0630.  [facility].  Patient agrees.  Will send patient a [employer] message with appt details as well.

## 2024-04-05 NOTE — TELEPHONE ENCOUNTER
Spoke to patient and he would like [facility].  He stated Thursdays are bad for him and he would like early am if possible.  I advised I will make the appt and call him back.  Patient agrees.

## 2024-04-09 ENCOUNTER — OFFICE VISIT (OUTPATIENT)
Dept: CARDIAC REHAB | Facility: HOSPITAL | Age: 74
End: 2024-04-09

## 2024-04-09 VITALS
HEART RATE: 62 BPM | SYSTOLIC BLOOD PRESSURE: 110 MMHG | DIASTOLIC BLOOD PRESSURE: 80 MMHG | BODY MASS INDEX: 28.02 KG/M2 | WEIGHT: 184.3 LBS | OXYGEN SATURATION: 95 %

## 2024-04-09 DIAGNOSIS — T82.855D STENOSIS OF CORONARY ARTERY STENT, SUBSEQUENT ENCOUNTER: Primary | ICD-10-CM

## 2024-04-11 ENCOUNTER — APPOINTMENT (OUTPATIENT)
Dept: CARDIAC REHAB | Facility: HOSPITAL | Age: 74
End: 2024-04-11

## 2024-04-16 ENCOUNTER — OFFICE VISIT (OUTPATIENT)
Dept: CARDIAC REHAB | Facility: HOSPITAL | Age: 74
End: 2024-04-16

## 2024-04-16 VITALS
BODY MASS INDEX: 28.39 KG/M2 | SYSTOLIC BLOOD PRESSURE: 130 MMHG | DIASTOLIC BLOOD PRESSURE: 70 MMHG | HEART RATE: 58 BPM | WEIGHT: 186.73 LBS | OXYGEN SATURATION: 95 %

## 2024-04-16 DIAGNOSIS — T82.855D STENOSIS OF CORONARY ARTERY STENT, SUBSEQUENT ENCOUNTER: Primary | ICD-10-CM

## 2024-04-18 ENCOUNTER — APPOINTMENT (OUTPATIENT)
Dept: CARDIAC REHAB | Facility: HOSPITAL | Age: 74
End: 2024-04-18

## 2024-04-18 VITALS
BODY MASS INDEX: 28.33 KG/M2 | HEART RATE: 59 BPM | WEIGHT: 186.29 LBS | OXYGEN SATURATION: 98 % | SYSTOLIC BLOOD PRESSURE: 120 MMHG | DIASTOLIC BLOOD PRESSURE: 80 MMHG

## 2024-04-18 DIAGNOSIS — T82.855D STENOSIS OF CORONARY ARTERY STENT, SUBSEQUENT ENCOUNTER: Primary | ICD-10-CM

## 2024-04-23 ENCOUNTER — OFFICE VISIT (OUTPATIENT)
Dept: CARDIAC REHAB | Facility: HOSPITAL | Age: 74
End: 2024-04-23

## 2024-04-23 VITALS
OXYGEN SATURATION: 96 % | WEIGHT: 178.13 LBS | HEART RATE: 60 BPM | SYSTOLIC BLOOD PRESSURE: 120 MMHG | DIASTOLIC BLOOD PRESSURE: 75 MMHG | BODY MASS INDEX: 27.08 KG/M2

## 2024-04-23 DIAGNOSIS — T82.855D STENOSIS OF CORONARY ARTERY STENT, SUBSEQUENT ENCOUNTER: Primary | ICD-10-CM

## 2024-04-25 ENCOUNTER — OFFICE VISIT (OUTPATIENT)
Dept: CARDIAC REHAB | Facility: HOSPITAL | Age: 74
End: 2024-04-25

## 2024-04-25 VITALS
HEART RATE: 60 BPM | BODY MASS INDEX: 28.29 KG/M2 | DIASTOLIC BLOOD PRESSURE: 72 MMHG | SYSTOLIC BLOOD PRESSURE: 100 MMHG | WEIGHT: 186.07 LBS | OXYGEN SATURATION: 92 %

## 2024-04-25 DIAGNOSIS — T82.855D STENOSIS OF CORONARY ARTERY STENT, SUBSEQUENT ENCOUNTER: Primary | ICD-10-CM

## 2024-04-30 ENCOUNTER — OFFICE VISIT (OUTPATIENT)
Dept: CARDIAC REHAB | Facility: HOSPITAL | Age: 74
End: 2024-04-30

## 2024-04-30 VITALS
DIASTOLIC BLOOD PRESSURE: 80 MMHG | HEART RATE: 63 BPM | OXYGEN SATURATION: 94 % | SYSTOLIC BLOOD PRESSURE: 112 MMHG | WEIGHT: 185.41 LBS | BODY MASS INDEX: 28.19 KG/M2

## 2024-04-30 DIAGNOSIS — T82.855D STENOSIS OF CORONARY ARTERY STENT, SUBSEQUENT ENCOUNTER: Primary | ICD-10-CM

## 2024-05-02 ENCOUNTER — OFFICE VISIT (OUTPATIENT)
Dept: CARDIAC REHAB | Facility: HOSPITAL | Age: 74
End: 2024-05-02

## 2024-05-02 VITALS
WEIGHT: 185.85 LBS | BODY MASS INDEX: 28.26 KG/M2 | SYSTOLIC BLOOD PRESSURE: 120 MMHG | HEART RATE: 55 BPM | OXYGEN SATURATION: 97 % | DIASTOLIC BLOOD PRESSURE: 70 MMHG

## 2024-05-02 DIAGNOSIS — T82.855D STENOSIS OF CORONARY ARTERY STENT, SUBSEQUENT ENCOUNTER: Primary | ICD-10-CM

## 2024-05-07 ENCOUNTER — OFFICE VISIT (OUTPATIENT)
Dept: CARDIAC REHAB | Facility: HOSPITAL | Age: 74
End: 2024-05-07

## 2024-05-07 VITALS
HEART RATE: 54 BPM | BODY MASS INDEX: 28.86 KG/M2 | DIASTOLIC BLOOD PRESSURE: 80 MMHG | OXYGEN SATURATION: 99 % | SYSTOLIC BLOOD PRESSURE: 114 MMHG | WEIGHT: 189.82 LBS

## 2024-05-07 DIAGNOSIS — T82.855D STENOSIS OF CORONARY ARTERY STENT, SUBSEQUENT ENCOUNTER: Primary | ICD-10-CM

## 2024-05-09 ENCOUNTER — OFFICE VISIT (OUTPATIENT)
Dept: CARDIAC REHAB | Facility: HOSPITAL | Age: 74
End: 2024-05-09

## 2024-05-09 VITALS
HEART RATE: 51 BPM | DIASTOLIC BLOOD PRESSURE: 70 MMHG | OXYGEN SATURATION: 96 % | BODY MASS INDEX: 28.73 KG/M2 | SYSTOLIC BLOOD PRESSURE: 120 MMHG | WEIGHT: 188.93 LBS

## 2024-05-09 DIAGNOSIS — T82.855D STENOSIS OF CORONARY ARTERY STENT, SUBSEQUENT ENCOUNTER: Primary | ICD-10-CM

## 2024-05-14 ENCOUNTER — OFFICE VISIT (OUTPATIENT)
Dept: CARDIAC REHAB | Facility: HOSPITAL | Age: 74
End: 2024-05-14

## 2024-05-14 VITALS
OXYGEN SATURATION: 94 % | SYSTOLIC BLOOD PRESSURE: 110 MMHG | WEIGHT: 186.29 LBS | HEART RATE: 56 BPM | BODY MASS INDEX: 28.33 KG/M2 | DIASTOLIC BLOOD PRESSURE: 70 MMHG

## 2024-05-14 DIAGNOSIS — T82.855D STENOSIS OF CORONARY ARTERY STENT, SUBSEQUENT ENCOUNTER: Primary | ICD-10-CM

## 2024-05-16 ENCOUNTER — OFFICE VISIT (OUTPATIENT)
Dept: CARDIAC REHAB | Facility: HOSPITAL | Age: 74
End: 2024-05-16

## 2024-05-16 VITALS
DIASTOLIC BLOOD PRESSURE: 60 MMHG | SYSTOLIC BLOOD PRESSURE: 108 MMHG | WEIGHT: 186.51 LBS | HEART RATE: 65 BPM | OXYGEN SATURATION: 95 % | BODY MASS INDEX: 28.36 KG/M2

## 2024-05-16 DIAGNOSIS — T82.855D STENOSIS OF CORONARY ARTERY STENT, SUBSEQUENT ENCOUNTER: Primary | ICD-10-CM

## 2024-05-21 ENCOUNTER — OFFICE VISIT (OUTPATIENT)
Dept: CARDIAC REHAB | Facility: HOSPITAL | Age: 74
End: 2024-05-21

## 2024-05-21 VITALS
DIASTOLIC BLOOD PRESSURE: 60 MMHG | SYSTOLIC BLOOD PRESSURE: 108 MMHG | WEIGHT: 185.63 LBS | BODY MASS INDEX: 28.22 KG/M2

## 2024-05-21 DIAGNOSIS — T82.855D STENOSIS OF CORONARY ARTERY STENT, SUBSEQUENT ENCOUNTER: Primary | ICD-10-CM

## 2024-05-23 ENCOUNTER — OFFICE VISIT (OUTPATIENT)
Dept: CARDIAC REHAB | Facility: HOSPITAL | Age: 74
End: 2024-05-23

## 2024-05-23 VITALS
DIASTOLIC BLOOD PRESSURE: 80 MMHG | HEART RATE: 60 BPM | OXYGEN SATURATION: 97 % | BODY MASS INDEX: 28.46 KG/M2 | WEIGHT: 187.17 LBS | SYSTOLIC BLOOD PRESSURE: 150 MMHG

## 2024-05-23 DIAGNOSIS — T82.855D STENOSIS OF CORONARY ARTERY STENT, SUBSEQUENT ENCOUNTER: Primary | ICD-10-CM

## 2024-05-25 ENCOUNTER — HOSPITAL ENCOUNTER (OUTPATIENT)
Dept: OTHER | Facility: HOSPITAL | Age: 74
Discharge: HOME | End: 2024-05-25

## 2024-05-25 DIAGNOSIS — E55.9 VITAMIN D DEFICIENCY: ICD-10-CM

## 2024-05-28 ENCOUNTER — OFFICE VISIT (OUTPATIENT)
Dept: CARDIAC REHAB | Facility: HOSPITAL | Age: 74
End: 2024-05-28

## 2024-05-28 VITALS
SYSTOLIC BLOOD PRESSURE: 128 MMHG | HEART RATE: 59 BPM | WEIGHT: 187.39 LBS | DIASTOLIC BLOOD PRESSURE: 70 MMHG | BODY MASS INDEX: 28.49 KG/M2 | OXYGEN SATURATION: 95 %

## 2024-05-28 DIAGNOSIS — T82.855D STENOSIS OF CORONARY ARTERY STENT, SUBSEQUENT ENCOUNTER: Primary | ICD-10-CM

## 2024-05-28 RX ORDER — METOPROLOL SUCCINATE 25 MG/1
TABLET, EXTENDED RELEASE ORAL
Qty: 45 TABLET | Refills: 1 | Status: SHIPPED | OUTPATIENT
Start: 2024-05-28

## 2024-05-28 RX ORDER — ERGOCALCIFEROL 1.25 MG/1
CAPSULE ORAL
Qty: 4 CAPSULE | Refills: 0 | Status: SHIPPED | OUTPATIENT
Start: 2024-05-28

## 2024-05-30 ENCOUNTER — OFFICE VISIT (OUTPATIENT)
Dept: CARDIAC REHAB | Facility: HOSPITAL | Age: 74
End: 2024-05-30

## 2024-05-30 VITALS
BODY MASS INDEX: 28.39 KG/M2 | HEART RATE: 60 BPM | DIASTOLIC BLOOD PRESSURE: 78 MMHG | OXYGEN SATURATION: 97 % | WEIGHT: 186.73 LBS | SYSTOLIC BLOOD PRESSURE: 130 MMHG

## 2024-05-30 DIAGNOSIS — T82.855D STENOSIS OF CORONARY ARTERY STENT, SUBSEQUENT ENCOUNTER: Primary | ICD-10-CM

## 2024-06-04 ENCOUNTER — OFFICE VISIT (OUTPATIENT)
Dept: CARDIAC REHAB | Facility: HOSPITAL | Age: 74
End: 2024-06-04

## 2024-06-04 VITALS
WEIGHT: 187.83 LBS | HEART RATE: 57 BPM | OXYGEN SATURATION: 95 % | SYSTOLIC BLOOD PRESSURE: 108 MMHG | BODY MASS INDEX: 28.56 KG/M2 | DIASTOLIC BLOOD PRESSURE: 80 MMHG

## 2024-06-04 DIAGNOSIS — T82.855D STENOSIS OF CORONARY ARTERY STENT, SUBSEQUENT ENCOUNTER: Primary | ICD-10-CM

## 2024-06-06 ENCOUNTER — LAB (OUTPATIENT)
Dept: LAB | Facility: HOSPITAL | Age: 74
End: 2024-06-06
Payer: MEDICARE

## 2024-06-06 ENCOUNTER — OFFICE VISIT (OUTPATIENT)
Dept: CARDIAC REHAB | Facility: HOSPITAL | Age: 74
End: 2024-06-06

## 2024-06-06 ENCOUNTER — TELEPHONE (OUTPATIENT)
Dept: UROLOGY | Facility: CLINIC | Age: 74
End: 2024-06-06
Payer: MEDICARE

## 2024-06-06 VITALS
OXYGEN SATURATION: 94 % | SYSTOLIC BLOOD PRESSURE: 106 MMHG | HEART RATE: 55 BPM | BODY MASS INDEX: 28.33 KG/M2 | DIASTOLIC BLOOD PRESSURE: 72 MMHG | WEIGHT: 186.29 LBS

## 2024-06-06 DIAGNOSIS — T82.855D STENOSIS OF CORONARY ARTERY STENT, SUBSEQUENT ENCOUNTER: Primary | ICD-10-CM

## 2024-06-06 DIAGNOSIS — R97.20 ELEVATED PSA: ICD-10-CM

## 2024-06-06 LAB — PSA SERPL-MCNC: 7.22 NG/ML (ref 0–4)

## 2024-06-06 PROCEDURE — 36415 COLL VENOUS BLD VENIPUNCTURE: CPT

## 2024-06-06 PROCEDURE — 84153 ASSAY OF PSA TOTAL: CPT

## 2024-06-06 NOTE — TELEPHONE ENCOUNTER
Spoke to pt and reminded him that a PSA lab is needed prior to appointment on 6/11. Pt verbalized understanding.

## 2024-06-09 NOTE — PROGRESS NOTES
Chief Complaint    Urologic complaint    Subjective          Yann Pardo presents to [facility]  History of Present Illness       74 year old  gentleman     Elevated PSA        Voiding ok.  No change    Having some frequency and nocturia x 1 0-1    Patient did retire recently       No GH    No prostate meds      PVR       000    No urologic family history.  No family history of prostate cancer.    Mother  at 92 and father  at 86.    s/p cardiac stent.  Patient does not smoke. Baby aspirin daily.        PSA          7.2   PSAd  0.11    2024 MRI prostate - 61 g  PI-RADS 4-posterior peripheral zone base on right.  Touches posterior lateral capsule surface no definitive extracapsular extension, 11 x 5 x 10 mm, unchanged.  Negative otherwise       7.2    2023 MRI prostate - 57 g  PI-RADS 4 -11 mm, right posterior lateral mid to base peripheral zone lesion.  Stable for the last 3 years.  Chronic prostatitis          8.7       2022 MRI prostate-60 g  PI-RADS 4-right peripheral zone 10 x 4 mm.  Stable from prior exam.  No extraprostatic extension.  Posterior marginal abutment.       7.3       7.2       11/15/2021 MRI prostate 65 g-PI-RADS 4 lesion right posterior lateral peripheral zone.  Stable in size.  11 mm      6.58    10/28/2020 MRI fusion prostate biopsy - right base focal high-grade PIN, negative otherwise    10/20 prostate MRI  49 g - lesion in the right posterior mid to base peripheral zone PIRADS 4 - 11 mm .  More indeterminate lesion in the anterior mid left peripheral zone 16mm, PIRADS 3      9.51  3/20 prostate biopsy - 49 g -  neg     6.89       5.4, percent free 20% (23%)     6.09  11/10  1.8  10/09  1.6      1.7            Past History:  Medical History: has a past medical history of Allergic, Asthma, Bronchospasm, Bronchospasm, CAD (coronary artery disease), Colon polyp, Diverticulosis,  Elevated PSA, GERD (gastroesophageal reflux disease), Heart disease, Hemorrhoids, Hyperglycemia, Hyperlipidemia, Hypertension, Lung collapse, Mitral regurgitation, Rib fracture, Sinus bradycardia, Sinus bradycardia, Sinusitis, and Tubular adenoma.   Surgical History: has a past surgical history that includes Coronary stent placement; Colonoscopy; Colonoscopy; Coronary stent placement; Hernia repair; Portacath placement; Prostate biopsy; and Colonoscopy (N/A, 3/30/2023).   Family History: family history includes Coronary artery disease in an other family member; Kidney cancer in his father.   Social History: reports that he quit smoking about 51 years ago. His smoking use included cigarettes. He started smoking about 55 years ago. He has a 2 pack-year smoking history. He quit smokeless tobacco use about 39 years ago.  His smokeless tobacco use included chew. He reports current alcohol use of about 6.0 standard drinks of alcohol per week. He reports that he does not use drugs.  Allergies: Patient has no known allergies.       Current Outpatient Medications:     aspirin 81 MG EC tablet, Take 1 tablet by mouth Daily., Disp: , Rfl:     levothyroxine (Synthroid) 25 MCG tablet, Take 1 tablet by mouth Daily., Disp: 90 tablet, Rfl: 1    levothyroxine (Synthroid) 50 MCG tablet, Take 1 tablet by mouth Daily., Disp: 90 tablet, Rfl: 3    metoprolol succinate XL (TOPROL-XL) 25 MG 24 hr tablet, TAKE 1/2 TABLET BY MOUTH EVERY DAY, Disp: 45 tablet, Rfl: 1    montelukast (SINGULAIR) 10 MG tablet, TAKE 1 TABLET BY MOUTH EVERY NIGHT, Disp: 90 tablet, Rfl: 3    nitroglycerin (NITROLINGUAL) 0.4 MG/SPRAY spray, Place 1 spray under the tongue Every 5 (Five) Minutes As Needed for Chest Pain., Disp: 1 each, Rfl: 1    omeprazole (priLOSEC) 40 MG capsule, Take 1 capsule by mouth Daily., Disp: 90 capsule, Rfl: 3    rosuvastatin (CRESTOR) 40 MG tablet, TAKE 1 TABLET BY MOUTH EVERY NIGHT AT BEDTIME, Disp: 90 tablet, Rfl: 1    vitamin D  (ERGOCALCIFEROL) 1.25 MG (11134 UT) capsule capsule, TAKE 1 CAPSULE BY MOUTH EVERY WEEK, Disp: 4 capsule, Rfl: 0            Objective     Vital Signs:   There were no vitals taken for this visit.             Assessment and Plan    Diagnoses and all orders for this visit:    1. Elevated PSA (Primary)      Elevated PSA    PSA and MRI stable.      Discussed repeat biopsy versus continued conservative monitoring.    F/u in 6 months with PSA    PVR at follow-up -some BPH symptoms with frequency

## 2024-06-11 ENCOUNTER — OFFICE VISIT (OUTPATIENT)
Dept: CARDIAC REHAB | Facility: HOSPITAL | Age: 74
End: 2024-06-11

## 2024-06-11 ENCOUNTER — OFFICE VISIT (OUTPATIENT)
Dept: UROLOGY | Facility: CLINIC | Age: 74
End: 2024-06-11
Payer: MEDICARE

## 2024-06-11 VITALS
SYSTOLIC BLOOD PRESSURE: 122 MMHG | DIASTOLIC BLOOD PRESSURE: 76 MMHG | BODY MASS INDEX: 28.59 KG/M2 | HEART RATE: 61 BPM | OXYGEN SATURATION: 99 % | WEIGHT: 188.05 LBS

## 2024-06-11 VITALS — HEIGHT: 68 IN | BODY MASS INDEX: 28.49 KG/M2 | WEIGHT: 188 LBS

## 2024-06-11 DIAGNOSIS — T82.855D STENOSIS OF CORONARY ARTERY STENT, SUBSEQUENT ENCOUNTER: Primary | ICD-10-CM

## 2024-06-11 DIAGNOSIS — R97.20 ELEVATED PSA: Primary | ICD-10-CM

## 2024-06-11 PROCEDURE — 1160F RVW MEDS BY RX/DR IN RCRD: CPT | Performed by: UROLOGY

## 2024-06-11 PROCEDURE — 1159F MED LIST DOCD IN RCRD: CPT | Performed by: UROLOGY

## 2024-06-11 PROCEDURE — 99213 OFFICE O/P EST LOW 20 MIN: CPT | Performed by: UROLOGY

## 2024-06-13 ENCOUNTER — OFFICE VISIT (OUTPATIENT)
Dept: CARDIAC REHAB | Facility: HOSPITAL | Age: 74
End: 2024-06-13

## 2024-06-13 VITALS
BODY MASS INDEX: 28.53 KG/M2 | SYSTOLIC BLOOD PRESSURE: 104 MMHG | DIASTOLIC BLOOD PRESSURE: 60 MMHG | OXYGEN SATURATION: 96 % | WEIGHT: 187.61 LBS | HEART RATE: 56 BPM

## 2024-06-13 DIAGNOSIS — T82.855D STENOSIS OF CORONARY ARTERY STENT, SUBSEQUENT ENCOUNTER: Primary | ICD-10-CM

## 2024-06-17 ENCOUNTER — HOSPITAL ENCOUNTER (OUTPATIENT)
Dept: CARDIOLOGY | Facility: HOSPITAL | Age: 74
Discharge: HOME | End: 2024-06-17
Payer: MEDICARE

## 2024-06-17 ENCOUNTER — LAB (OUTPATIENT)
Dept: LAB | Facility: HOSPITAL | Age: 74
End: 2024-06-17
Payer: MEDICARE

## 2024-06-17 ENCOUNTER — TRANSCRIBE ORDERS (OUTPATIENT)
Dept: ADMINISTRATIVE | Facility: HOSPITAL | Age: 74
End: 2024-06-17
Payer: MEDICARE

## 2024-06-17 DIAGNOSIS — H02.413 MECHANICAL PTOSIS OF EYELID OF BOTH EYES: Primary | ICD-10-CM

## 2024-06-17 DIAGNOSIS — H02.834 DERMATOCHALASIS OF BOTH UPPER EYELIDS: ICD-10-CM

## 2024-06-17 DIAGNOSIS — H04.123 DRY EYE SYNDROME OF BOTH EYES: ICD-10-CM

## 2024-06-17 DIAGNOSIS — H02.831 DERMATOCHALASIS OF BOTH UPPER EYELIDS: ICD-10-CM

## 2024-06-17 DIAGNOSIS — H02.413 MECHANICAL PTOSIS OF EYELID OF BOTH EYES: ICD-10-CM

## 2024-06-17 LAB
ANION GAP SERPL CALCULATED.3IONS-SCNC: 8.7 MMOL/L (ref 5–15)
BUN SERPL-MCNC: 18 MG/DL (ref 8–23)
BUN/CREAT SERPL: 13.7 (ref 7–25)
CALCIUM SPEC-SCNC: 9 MG/DL (ref 8.6–10.5)
CHLORIDE SERPL-SCNC: 106 MMOL/L (ref 98–107)
CO2 SERPL-SCNC: 25.3 MMOL/L (ref 22–29)
CREAT SERPL-MCNC: 1.31 MG/DL (ref 0.76–1.27)
EGFRCR SERPLBLD CKD-EPI 2021: 56.8 ML/MIN/1.73
GLUCOSE SERPL-MCNC: 110 MG/DL (ref 65–99)
POTASSIUM SERPL-SCNC: 4.5 MMOL/L (ref 3.5–5.2)
SODIUM SERPL-SCNC: 140 MMOL/L (ref 136–145)

## 2024-06-17 PROCEDURE — 36415 COLL VENOUS BLD VENIPUNCTURE: CPT

## 2024-06-17 PROCEDURE — 93005 ELECTROCARDIOGRAM TRACING: CPT | Performed by: OPHTHALMOLOGY

## 2024-06-17 PROCEDURE — 80048 BASIC METABOLIC PNL TOTAL CA: CPT

## 2024-06-18 ENCOUNTER — OFFICE VISIT (OUTPATIENT)
Dept: CARDIAC REHAB | Facility: HOSPITAL | Age: 74
End: 2024-06-18

## 2024-06-18 VITALS
WEIGHT: 188.93 LBS | BODY MASS INDEX: 28.73 KG/M2 | SYSTOLIC BLOOD PRESSURE: 116 MMHG | DIASTOLIC BLOOD PRESSURE: 64 MMHG | OXYGEN SATURATION: 94 % | HEART RATE: 65 BPM

## 2024-06-18 DIAGNOSIS — T82.855D STENOSIS OF CORONARY ARTERY STENT, SUBSEQUENT ENCOUNTER: Primary | ICD-10-CM

## 2024-06-20 ENCOUNTER — OFFICE VISIT (OUTPATIENT)
Dept: CARDIAC REHAB | Facility: HOSPITAL | Age: 74
End: 2024-06-20

## 2024-06-20 VITALS
HEART RATE: 68 BPM | OXYGEN SATURATION: 95 % | DIASTOLIC BLOOD PRESSURE: 82 MMHG | SYSTOLIC BLOOD PRESSURE: 120 MMHG | BODY MASS INDEX: 28.33 KG/M2 | WEIGHT: 186.29 LBS

## 2024-06-20 DIAGNOSIS — T82.855D STENOSIS OF CORONARY ARTERY STENT, SUBSEQUENT ENCOUNTER: Primary | ICD-10-CM

## 2024-06-20 LAB
QT INTERVAL: 414 MS
QTC INTERVAL: 388 MS

## 2024-06-25 ENCOUNTER — APPOINTMENT (OUTPATIENT)
Dept: CARDIAC REHAB | Facility: HOSPITAL | Age: 74
End: 2024-06-25

## 2024-06-27 ENCOUNTER — APPOINTMENT (OUTPATIENT)
Dept: CARDIAC REHAB | Facility: HOSPITAL | Age: 74
End: 2024-06-27

## 2024-06-29 DIAGNOSIS — E55.9 VITAMIN D DEFICIENCY: ICD-10-CM

## 2024-07-01 RX ORDER — ERGOCALCIFEROL 1.25 MG/1
CAPSULE ORAL
Qty: 4 CAPSULE | Refills: 0 | Status: SHIPPED | OUTPATIENT
Start: 2024-07-01

## 2024-07-02 ENCOUNTER — OFFICE VISIT (OUTPATIENT)
Dept: CARDIAC REHAB | Facility: HOSPITAL | Age: 74
End: 2024-07-02

## 2024-07-02 ENCOUNTER — APPOINTMENT (OUTPATIENT)
Dept: CARDIAC REHAB | Facility: HOSPITAL | Age: 74
End: 2024-07-02

## 2024-07-02 VITALS
HEART RATE: 55 BPM | BODY MASS INDEX: 28.33 KG/M2 | SYSTOLIC BLOOD PRESSURE: 150 MMHG | WEIGHT: 186.29 LBS | DIASTOLIC BLOOD PRESSURE: 88 MMHG | OXYGEN SATURATION: 97 %

## 2024-07-02 DIAGNOSIS — T82.855D STENOSIS OF CORONARY ARTERY STENT, SUBSEQUENT ENCOUNTER: Primary | ICD-10-CM

## 2024-07-09 ENCOUNTER — APPOINTMENT (OUTPATIENT)
Dept: CARDIAC REHAB | Facility: HOSPITAL | Age: 74
End: 2024-07-09

## 2024-07-11 ENCOUNTER — OFFICE VISIT (OUTPATIENT)
Dept: CARDIAC REHAB | Facility: HOSPITAL | Age: 74
End: 2024-07-11

## 2024-07-11 VITALS
DIASTOLIC BLOOD PRESSURE: 70 MMHG | WEIGHT: 187.17 LBS | SYSTOLIC BLOOD PRESSURE: 108 MMHG | HEART RATE: 59 BPM | OXYGEN SATURATION: 97 % | BODY MASS INDEX: 28.46 KG/M2

## 2024-07-11 DIAGNOSIS — T82.855D STENOSIS OF CORONARY ARTERY STENT, SUBSEQUENT ENCOUNTER: Primary | ICD-10-CM

## 2024-07-16 ENCOUNTER — OFFICE VISIT (OUTPATIENT)
Dept: CARDIAC REHAB | Facility: HOSPITAL | Age: 74
End: 2024-07-16

## 2024-07-16 VITALS
OXYGEN SATURATION: 96 % | BODY MASS INDEX: 28.49 KG/M2 | WEIGHT: 187.39 LBS | SYSTOLIC BLOOD PRESSURE: 140 MMHG | DIASTOLIC BLOOD PRESSURE: 82 MMHG | HEART RATE: 58 BPM

## 2024-07-16 DIAGNOSIS — T82.855D STENOSIS OF CORONARY ARTERY STENT, SUBSEQUENT ENCOUNTER: Primary | ICD-10-CM

## 2024-07-18 ENCOUNTER — OFFICE VISIT (OUTPATIENT)
Dept: CARDIAC REHAB | Facility: HOSPITAL | Age: 74
End: 2024-07-18

## 2024-07-18 VITALS
HEART RATE: 55 BPM | BODY MASS INDEX: 28.46 KG/M2 | SYSTOLIC BLOOD PRESSURE: 118 MMHG | DIASTOLIC BLOOD PRESSURE: 66 MMHG | OXYGEN SATURATION: 95 % | WEIGHT: 187.17 LBS

## 2024-07-18 DIAGNOSIS — T82.855D STENOSIS OF CORONARY ARTERY STENT, SUBSEQUENT ENCOUNTER: Primary | ICD-10-CM

## 2024-07-23 ENCOUNTER — OFFICE VISIT (OUTPATIENT)
Dept: CARDIAC REHAB | Facility: HOSPITAL | Age: 74
End: 2024-07-23

## 2024-07-23 VITALS
HEART RATE: 59 BPM | OXYGEN SATURATION: 95 % | DIASTOLIC BLOOD PRESSURE: 60 MMHG | SYSTOLIC BLOOD PRESSURE: 120 MMHG | WEIGHT: 186.29 LBS | BODY MASS INDEX: 28.33 KG/M2

## 2024-07-23 DIAGNOSIS — T82.855D STENOSIS OF CORONARY ARTERY STENT, SUBSEQUENT ENCOUNTER: Primary | ICD-10-CM

## 2024-07-25 ENCOUNTER — OFFICE VISIT (OUTPATIENT)
Dept: CARDIAC REHAB | Facility: HOSPITAL | Age: 74
End: 2024-07-25

## 2024-07-25 VITALS
OXYGEN SATURATION: 97 % | BODY MASS INDEX: 28.29 KG/M2 | HEART RATE: 54 BPM | WEIGHT: 186.07 LBS | SYSTOLIC BLOOD PRESSURE: 138 MMHG | DIASTOLIC BLOOD PRESSURE: 80 MMHG

## 2024-07-25 DIAGNOSIS — T82.855D STENOSIS OF CORONARY ARTERY STENT, SUBSEQUENT ENCOUNTER: Primary | ICD-10-CM

## 2024-07-30 ENCOUNTER — OFFICE VISIT (OUTPATIENT)
Dept: CARDIAC REHAB | Facility: HOSPITAL | Age: 74
End: 2024-07-30

## 2024-07-30 VITALS
DIASTOLIC BLOOD PRESSURE: 78 MMHG | HEART RATE: 54 BPM | BODY MASS INDEX: 28.19 KG/M2 | WEIGHT: 185.41 LBS | SYSTOLIC BLOOD PRESSURE: 140 MMHG | OXYGEN SATURATION: 96 %

## 2024-07-30 DIAGNOSIS — T82.855D STENOSIS OF CORONARY ARTERY STENT, SUBSEQUENT ENCOUNTER: Primary | ICD-10-CM

## 2024-08-01 ENCOUNTER — OFFICE VISIT (OUTPATIENT)
Dept: CARDIAC REHAB | Facility: HOSPITAL | Age: 74
End: 2024-08-01

## 2024-08-01 VITALS
DIASTOLIC BLOOD PRESSURE: 70 MMHG | OXYGEN SATURATION: 95 % | BODY MASS INDEX: 28.06 KG/M2 | HEART RATE: 60 BPM | WEIGHT: 184.53 LBS | SYSTOLIC BLOOD PRESSURE: 130 MMHG

## 2024-08-01 DIAGNOSIS — T82.855D STENOSIS OF CORONARY ARTERY STENT, SUBSEQUENT ENCOUNTER: Primary | ICD-10-CM

## 2024-08-05 ENCOUNTER — LAB (OUTPATIENT)
Dept: LAB | Facility: HOSPITAL | Age: 74
End: 2024-08-05
Payer: MEDICARE

## 2024-08-05 DIAGNOSIS — Z95.5 S/P CORONARY ARTERY STENT PLACEMENT: ICD-10-CM

## 2024-08-05 DIAGNOSIS — I34.0 MITRAL VALVE INSUFFICIENCY, UNSPECIFIED ETIOLOGY: ICD-10-CM

## 2024-08-05 DIAGNOSIS — E03.8 SUBCLINICAL HYPOTHYROIDISM: ICD-10-CM

## 2024-08-05 DIAGNOSIS — R74.8 ELEVATED LIVER ENZYMES: ICD-10-CM

## 2024-08-05 DIAGNOSIS — E55.9 VITAMIN D DEFICIENCY: ICD-10-CM

## 2024-08-05 DIAGNOSIS — I10 PRIMARY HYPERTENSION: ICD-10-CM

## 2024-08-05 DIAGNOSIS — R97.20 ELEVATED PSA: ICD-10-CM

## 2024-08-05 DIAGNOSIS — R73.01 IFG (IMPAIRED FASTING GLUCOSE): ICD-10-CM

## 2024-08-05 DIAGNOSIS — E78.2 MIXED HYPERLIPIDEMIA: ICD-10-CM

## 2024-08-05 DIAGNOSIS — J45.20 MILD INTERMITTENT ASTHMA, UNSPECIFIED WHETHER COMPLICATED: ICD-10-CM

## 2024-08-05 LAB
ALBUMIN SERPL-MCNC: 4.1 G/DL (ref 3.5–5.2)
ALBUMIN/GLOB SERPL: 1.6 G/DL
ALP SERPL-CCNC: 64 U/L (ref 39–117)
ALT SERPL W P-5'-P-CCNC: 23 U/L (ref 1–41)
ANION GAP SERPL CALCULATED.3IONS-SCNC: 10.5 MMOL/L (ref 5–15)
AST SERPL-CCNC: 25 U/L (ref 1–40)
BASOPHILS # BLD AUTO: 0.06 10*3/MM3 (ref 0–0.2)
BASOPHILS NFR BLD AUTO: 1 % (ref 0–1.5)
BILIRUB SERPL-MCNC: 0.5 MG/DL (ref 0–1.2)
BUN SERPL-MCNC: 19 MG/DL (ref 8–23)
BUN/CREAT SERPL: 13.2 (ref 7–25)
CALCIUM SPEC-SCNC: 9.2 MG/DL (ref 8.6–10.5)
CHLORIDE SERPL-SCNC: 106 MMOL/L (ref 98–107)
CHOLEST SERPL-MCNC: 177 MG/DL (ref 0–200)
CO2 SERPL-SCNC: 25.5 MMOL/L (ref 22–29)
CREAT SERPL-MCNC: 1.44 MG/DL (ref 0.76–1.27)
DEPRECATED RDW RBC AUTO: 43 FL (ref 37–54)
EGFRCR SERPLBLD CKD-EPI 2021: 50.7 ML/MIN/1.73
EOSINOPHIL # BLD AUTO: 0.15 10*3/MM3 (ref 0–0.4)
EOSINOPHIL NFR BLD AUTO: 2.5 % (ref 0.3–6.2)
ERYTHROCYTE [DISTWIDTH] IN BLOOD BY AUTOMATED COUNT: 12.7 % (ref 12.3–15.4)
GLOBULIN UR ELPH-MCNC: 2.6 GM/DL
GLUCOSE SERPL-MCNC: 99 MG/DL (ref 65–99)
HBA1C MFR BLD: 6.1 % (ref 4.8–5.6)
HCT VFR BLD AUTO: 46.6 % (ref 37.5–51)
HDLC SERPL-MCNC: 40 MG/DL (ref 40–60)
HGB BLD-MCNC: 15 G/DL (ref 13–17.7)
IMM GRANULOCYTES # BLD AUTO: 0.02 10*3/MM3 (ref 0–0.05)
IMM GRANULOCYTES NFR BLD AUTO: 0.3 % (ref 0–0.5)
LDLC SERPL CALC-MCNC: 105 MG/DL (ref 0–100)
LDLC/HDLC SERPL: 2.5 {RATIO}
LYMPHOCYTES # BLD AUTO: 1.96 10*3/MM3 (ref 0.7–3.1)
LYMPHOCYTES NFR BLD AUTO: 32.9 % (ref 19.6–45.3)
MCH RBC QN AUTO: 29.9 PG (ref 26.6–33)
MCHC RBC AUTO-ENTMCNC: 32.2 G/DL (ref 31.5–35.7)
MCV RBC AUTO: 93 FL (ref 79–97)
MONOCYTES # BLD AUTO: 0.54 10*3/MM3 (ref 0.1–0.9)
MONOCYTES NFR BLD AUTO: 9.1 % (ref 5–12)
NEUTROPHILS NFR BLD AUTO: 3.22 10*3/MM3 (ref 1.7–7)
NEUTROPHILS NFR BLD AUTO: 54.2 % (ref 42.7–76)
NRBC BLD AUTO-RTO: 0 /100 WBC (ref 0–0.2)
PLATELET # BLD AUTO: 174 10*3/MM3 (ref 140–450)
PMV BLD AUTO: 10.3 FL (ref 6–12)
POTASSIUM SERPL-SCNC: 4.3 MMOL/L (ref 3.5–5.2)
PROT SERPL-MCNC: 6.7 G/DL (ref 6–8.5)
RBC # BLD AUTO: 5.01 10*6/MM3 (ref 4.14–5.8)
SODIUM SERPL-SCNC: 142 MMOL/L (ref 136–145)
TRIGL SERPL-MCNC: 185 MG/DL (ref 0–150)
VLDLC SERPL-MCNC: 32 MG/DL (ref 5–40)
WBC NRBC COR # BLD AUTO: 5.95 10*3/MM3 (ref 3.4–10.8)

## 2024-08-05 PROCEDURE — 85025 COMPLETE CBC W/AUTO DIFF WBC: CPT

## 2024-08-05 PROCEDURE — 80053 COMPREHEN METABOLIC PANEL: CPT

## 2024-08-05 PROCEDURE — 83036 HEMOGLOBIN GLYCOSYLATED A1C: CPT

## 2024-08-05 PROCEDURE — 36415 COLL VENOUS BLD VENIPUNCTURE: CPT

## 2024-08-05 PROCEDURE — 80061 LIPID PANEL: CPT

## 2024-08-06 ENCOUNTER — OFFICE VISIT (OUTPATIENT)
Dept: CARDIAC REHAB | Facility: HOSPITAL | Age: 74
End: 2024-08-06

## 2024-08-06 VITALS
WEIGHT: 184.3 LBS | SYSTOLIC BLOOD PRESSURE: 110 MMHG | BODY MASS INDEX: 28.02 KG/M2 | HEART RATE: 66 BPM | DIASTOLIC BLOOD PRESSURE: 70 MMHG | OXYGEN SATURATION: 96 %

## 2024-08-06 DIAGNOSIS — T82.855D STENOSIS OF CORONARY ARTERY STENT, SUBSEQUENT ENCOUNTER: Primary | ICD-10-CM

## 2024-08-08 ENCOUNTER — OFFICE VISIT (OUTPATIENT)
Dept: CARDIAC REHAB | Facility: HOSPITAL | Age: 74
End: 2024-08-08

## 2024-08-08 VITALS
OXYGEN SATURATION: 97 % | DIASTOLIC BLOOD PRESSURE: 80 MMHG | BODY MASS INDEX: 27.89 KG/M2 | WEIGHT: 183.42 LBS | SYSTOLIC BLOOD PRESSURE: 128 MMHG | HEART RATE: 60 BPM

## 2024-08-08 DIAGNOSIS — T82.855D STENOSIS OF CORONARY ARTERY STENT, SUBSEQUENT ENCOUNTER: Primary | ICD-10-CM

## 2024-08-12 ENCOUNTER — OFFICE VISIT (OUTPATIENT)
Dept: INTERNAL MEDICINE | Age: 74
End: 2024-08-12
Payer: MEDICARE

## 2024-08-12 VITALS
HEART RATE: 66 BPM | OXYGEN SATURATION: 98 % | HEIGHT: 68 IN | TEMPERATURE: 98.2 F | WEIGHT: 187 LBS | BODY MASS INDEX: 28.34 KG/M2

## 2024-08-12 DIAGNOSIS — L98.9 SKIN LESION OF FACE: ICD-10-CM

## 2024-08-12 DIAGNOSIS — R73.01 IFG (IMPAIRED FASTING GLUCOSE): ICD-10-CM

## 2024-08-12 DIAGNOSIS — J45.20 MILD INTERMITTENT ASTHMA, UNSPECIFIED WHETHER COMPLICATED: ICD-10-CM

## 2024-08-12 DIAGNOSIS — I34.0 MITRAL VALVE INSUFFICIENCY, UNSPECIFIED ETIOLOGY: ICD-10-CM

## 2024-08-12 DIAGNOSIS — E03.8 SUBCLINICAL HYPOTHYROIDISM: ICD-10-CM

## 2024-08-12 DIAGNOSIS — Z95.5 S/P CORONARY ARTERY STENT PLACEMENT: ICD-10-CM

## 2024-08-12 DIAGNOSIS — Z86.010 HISTORY OF COLON POLYPS: ICD-10-CM

## 2024-08-12 DIAGNOSIS — Z00.00 MEDICARE ANNUAL WELLNESS VISIT, SUBSEQUENT: Primary | ICD-10-CM

## 2024-08-12 DIAGNOSIS — C61 PROSTATE CANCER: ICD-10-CM

## 2024-08-12 DIAGNOSIS — Z12.5 ENCOUNTER FOR SCREENING FOR MALIGNANT NEOPLASM OF PROSTATE: ICD-10-CM

## 2024-08-12 DIAGNOSIS — R97.20 ELEVATED PSA: ICD-10-CM

## 2024-08-12 DIAGNOSIS — L71.9 ACNE ROSACEA: ICD-10-CM

## 2024-08-12 DIAGNOSIS — E55.9 VITAMIN D DEFICIENCY: ICD-10-CM

## 2024-08-12 DIAGNOSIS — I10 PRIMARY HYPERTENSION: ICD-10-CM

## 2024-08-12 DIAGNOSIS — E78.00 PURE HYPERCHOLESTEROLEMIA: ICD-10-CM

## 2024-08-12 DIAGNOSIS — R74.8 ELEVATED LIVER ENZYMES: ICD-10-CM

## 2024-08-12 PROCEDURE — G0439 PPPS, SUBSEQ VISIT: HCPCS | Performed by: INTERNAL MEDICINE

## 2024-08-12 PROCEDURE — 1126F AMNT PAIN NOTED NONE PRSNT: CPT | Performed by: INTERNAL MEDICINE

## 2024-08-12 PROCEDURE — 99214 OFFICE O/P EST MOD 30 MIN: CPT | Performed by: INTERNAL MEDICINE

## 2024-08-12 PROCEDURE — 3044F HG A1C LEVEL LT 7.0%: CPT | Performed by: INTERNAL MEDICINE

## 2024-08-12 PROCEDURE — 1170F FXNL STATUS ASSESSED: CPT | Performed by: INTERNAL MEDICINE

## 2024-08-12 RX ORDER — ERYTHROMYCIN 20 MG/ML
SOLUTION TOPICAL DAILY
Qty: 15 ML | Refills: 5 | Status: SHIPPED | OUTPATIENT
Start: 2024-08-12

## 2024-08-12 NOTE — PROGRESS NOTES
"CHIEF COMPLAINT/ HPI:  Medicare Wellness-subsequent (Wellness , Lab follow up. Pt is asking for RX Erythromycin Top Sol 2% [employer].Pt states not taking BP medication. )              Objective   Vital Signs  Vitals:    08/12/24 1257   Pulse: 66   Temp: 98.2 °F (36.8 °C)   SpO2: 98%   Weight: 84.8 kg (187 lb)   Height: 172.7 cm (67.99\")      Body mass index is 28.44 kg/m².  Review of Systems   Constitutional: Negative.    HENT: Negative.     Eyes: Negative.    Respiratory: Negative.     Cardiovascular: Negative.    Gastrointestinal: Negative.    Endocrine: Negative.    Genitourinary: Negative.    Musculoskeletal: Negative.    Allergic/Immunologic: Negative.    Neurological: Negative.    Hematological: Negative.    Psychiatric/Behavioral: Negative.        Physical Exam  Constitutional:       General: He is not in acute distress.     Appearance: Normal appearance.   HENT:      Head: Normocephalic.      Mouth/Throat:      Mouth: Mucous membranes are moist.   Eyes:      Conjunctiva/sclera: Conjunctivae normal.      Pupils: Pupils are equal, round, and reactive to light.   Cardiovascular:      Rate and Rhythm: Normal rate and regular rhythm.      Pulses: Normal pulses.      Heart sounds: Normal heart sounds.   Pulmonary:      Effort: Pulmonary effort is normal.      Breath sounds: Normal breath sounds.   Abdominal:      General: Abdomen is flat. Bowel sounds are normal.      Palpations: Abdomen is soft.   Musculoskeletal:         General: No swelling. Normal range of motion.      Cervical back: Neck supple.   Skin:     General: Skin is warm and dry.      Coloration: Skin is not jaundiced.   Neurological:      General: No focal deficit present.      Mental Status: He is alert and oriented to person, place, and time. Mental status is at baseline.   Psychiatric:         Mood and Affect: Mood normal.         Behavior: Behavior normal.         Thought Content: Thought content normal.         Judgment: Judgment normal.      "   Result Review :   Lab Results   Component Value Date    BNP <15 03/17/2019     CMP          2/12/2024    13:29 6/17/2024    08:50 8/5/2024    07:39   CMP   Glucose 67  110  99    BUN 14  18  19    Creatinine 1.38  1.31  1.44    EGFR 53.7  56.8  50.7    Sodium 143  140  142    Potassium 4.6  4.5  4.3    Chloride 107  106  106    Calcium 9.7  9.0  9.2    Total Protein 6.2   6.7    Albumin 4.4   4.1    Globulin 1.8   2.6    Total Bilirubin 0.2   0.5    Alkaline Phosphatase 60   64    AST (SGOT) 34   25    ALT (SGPT) 38   23    Albumin/Globulin Ratio 2.4   1.6    BUN/Creatinine Ratio 10.1  13.7  13.2    Anion Gap 8.0  8.7  10.5      CBC w/diff          10/9/2023    06:59 2/12/2024    13:29 8/5/2024    07:39   CBC w/Diff   WBC 4.40  4.40  5.95    RBC 4.86  4.93  5.01    Hemoglobin 14.9  15.2  15.0    Hematocrit 43.9  44.4  46.6    MCV 90.3  90.1  93.0    MCH 30.7  30.8  29.9    MCHC 33.9  34.2  32.2    RDW 12.1  12.1  12.7    Platelets 161  171  174    Neutrophil Rel % 49.7  51.3  54.2    Immature Granulocyte Rel % 0.2  0.2  0.3    Lymphocyte Rel % 34.8  34.8  32.9    Monocyte Rel % 9.8  10.5  9.1    Eosinophil Rel % 3.9  1.6  2.5    Basophil Rel % 1.6  1.6  1.0       Lipid Panel          10/9/2023    06:59 2/12/2024    13:29 8/5/2024    07:39   Lipid Panel   Total Cholesterol 144  145  177    Triglycerides 239  256  185    HDL Cholesterol 36  39  40    VLDL Cholesterol 39  41  32    LDL Cholesterol  69  65  105    LDL/HDL Ratio 1.67  1.41  2.50       Lab Results   Component Value Date    TSH 4.550 (H) 02/12/2024    TSH 3.520 10/09/2023    TSH 3.450 02/03/2023      Lab Results   Component Value Date    FREET4 1.04 02/12/2024    FREET4 1.08 10/09/2023    FREET4 1.11 02/03/2023      A1C Last 3 Results          10/9/2023    06:59 2/12/2024    13:29 8/5/2024    07:39   HGBA1C Last 3 Results   Hemoglobin A1C 5.80  5.90  6.10       PSA          12/1/2023    06:41 6/6/2024    13:42   PSA   PSA 7.270  7.220                      Visit Diagnoses:    ICD-10-CM ICD-9-CM   1. Medicare annual wellness visit, subsequent  Z00.00 V70.0   2. Pure hypercholesterolemia  E78.00 272.0   3. History of colon polyps  Z86.010 V12.72   4. Prostate cancer  C61 185   5. Mild intermittent asthma, unspecified whether complicated  J45.20 493.90   6. VIT D DEF  E55.9 268.9   7. Subclinical hypothyroidism  E03.8 244.8   8. Primary hypertension  I10 401.9   9. S/P coronary artery stent placement  Z95.5 V45.82   10. Mitral valve insufficiency, unspecified etiology  I34.0 424.0   11. Elevated LFT  R74.8 790.5   12. IFG (impaired fasting glucose)  R73.01 790.21   13. Elevated PSA  R97.20 790.93   14. Encounter for screening for malignant neoplasm of prostate  Z12.5 V76.44   15. Skin lesion of face  L98.9 709.9   16. Acne rosacea  L71.9 695.3       Assessment and Plan   Diagnoses and all orders for this visit:    1. Medicare annual wellness visit, subsequent (Primary)  -     MicroAlbumin, Urine, Random - Urine, Clean Catch; Future  -     Hemoglobin A1c; Future  -     Comprehensive Metabolic Panel; Future  -     CBC & Differential; Future  -     PSA Screen; Future  -     TSH+Free T4; Future  -     Lipid Panel; Future  -     MicroAlbumin, Urine, Random - Urine, Clean Catch; Future    2. Pure hypercholesterolemia  -     MicroAlbumin, Urine, Random - Urine, Clean Catch; Future  -     Hemoglobin A1c; Future  -     Comprehensive Metabolic Panel; Future  -     CBC & Differential; Future  -     PSA Screen; Future  -     TSH+Free T4; Future  -     Lipid Panel; Future  -     MicroAlbumin, Urine, Random - Urine, Clean Catch; Future    3. History of colon polyps  -     MicroAlbumin, Urine, Random - Urine, Clean Catch; Future  -     Hemoglobin A1c; Future  -     Comprehensive Metabolic Panel; Future  -     CBC & Differential; Future  -     PSA Screen; Future  -     TSH+Free T4; Future  -     Lipid Panel; Future  -     MicroAlbumin, Urine, Random - Urine, Clean Catch;  Future    4. Prostate cancer  -     MicroAlbumin, Urine, Random - Urine, Clean Catch; Future  -     Hemoglobin A1c; Future  -     Comprehensive Metabolic Panel; Future  -     CBC & Differential; Future  -     PSA Screen; Future  -     TSH+Free T4; Future  -     Lipid Panel; Future  -     MicroAlbumin, Urine, Random - Urine, Clean Catch; Future    5. Mild intermittent asthma, unspecified whether complicated  -     MicroAlbumin, Urine, Random - Urine, Clean Catch; Future  -     Hemoglobin A1c; Future  -     Comprehensive Metabolic Panel; Future  -     CBC & Differential; Future  -     PSA Screen; Future  -     TSH+Free T4; Future  -     Lipid Panel; Future  -     MicroAlbumin, Urine, Random - Urine, Clean Catch; Future    6. VIT D DEF  -     MicroAlbumin, Urine, Random - Urine, Clean Catch; Future  -     Hemoglobin A1c; Future  -     Comprehensive Metabolic Panel; Future  -     CBC & Differential; Future  -     PSA Screen; Future  -     TSH+Free T4; Future  -     Lipid Panel; Future  -     MicroAlbumin, Urine, Random - Urine, Clean Catch; Future    7. Subclinical hypothyroidism  -     MicroAlbumin, Urine, Random - Urine, Clean Catch; Future  -     Hemoglobin A1c; Future  -     Comprehensive Metabolic Panel; Future  -     CBC & Differential; Future  -     PSA Screen; Future  -     TSH+Free T4; Future  -     Lipid Panel; Future  -     MicroAlbumin, Urine, Random - Urine, Clean Catch; Future    8. Primary hypertension  -     MicroAlbumin, Urine, Random - Urine, Clean Catch; Future  -     Hemoglobin A1c; Future  -     Comprehensive Metabolic Panel; Future  -     CBC & Differential; Future  -     PSA Screen; Future  -     TSH+Free T4; Future  -     Lipid Panel; Future  -     MicroAlbumin, Urine, Random - Urine, Clean Catch; Future    9. S/P coronary artery stent placement  -     MicroAlbumin, Urine, Random - Urine, Clean Catch; Future  -     Hemoglobin A1c; Future  -     Comprehensive Metabolic Panel; Future  -     CBC &  Differential; Future  -     PSA Screen; Future  -     TSH+Free T4; Future  -     Lipid Panel; Future  -     MicroAlbumin, Urine, Random - Urine, Clean Catch; Future    10. Mitral valve insufficiency, unspecified etiology  -     MicroAlbumin, Urine, Random - Urine, Clean Catch; Future  -     Hemoglobin A1c; Future  -     Comprehensive Metabolic Panel; Future  -     CBC & Differential; Future  -     PSA Screen; Future  -     TSH+Free T4; Future  -     Lipid Panel; Future  -     MicroAlbumin, Urine, Random - Urine, Clean Catch; Future    11. Elevated LFT  -     MicroAlbumin, Urine, Random - Urine, Clean Catch; Future  -     Hemoglobin A1c; Future  -     Comprehensive Metabolic Panel; Future  -     CBC & Differential; Future  -     PSA Screen; Future  -     TSH+Free T4; Future  -     Lipid Panel; Future  -     MicroAlbumin, Urine, Random - Urine, Clean Catch; Future    12. IFG (impaired fasting glucose)  -     Hemoglobin A1c; Future  -     Comprehensive Metabolic Panel; Future  -     CBC & Differential; Future  -     PSA Screen; Future  -     TSH+Free T4; Future  -     Lipid Panel; Future  -     MicroAlbumin, Urine, Random - Urine, Clean Catch; Future    13. Elevated PSA  -     Hemoglobin A1c; Future  -     Comprehensive Metabolic Panel; Future  -     CBC & Differential; Future  -     PSA Screen; Future  -     TSH+Free T4; Future  -     Lipid Panel; Future  -     MicroAlbumin, Urine, Random - Urine, Clean Catch; Future    14. Encounter for screening for malignant neoplasm of prostate  -     PSA Screen; Future    15. Skin lesion of face  -     Ambulatory Referral to Dermatology    16. Acne rosacea  -     Ambulatory Referral to Dermatology    Other orders  -     erythromycin with ethanol (THERAMYCIN) 2 % external solution; Apply  topically to the appropriate area as directed Daily.  Dispense: 15 mL; Refill: 5        Annual wellness visit completed questionnaires, August 12, 2024    Gastroesophageal reflux symptoms ---  continues omeprazole 40 mg daily     CKD stage IIIa creatinine up slightly at 1.4--August 2024 patient is can to drink more fluids,    Coronary artery disease previous stent placement 2005 patient still goes to cardiac rehab,------ does follow-up with [facility] cardiology once a year,, continues Crestor 40 mg daily, metoprolol extended release 25 mg half a tablet daily,  aspirin 81 mg daily, -----------patient exercises twice a week at cardiac rehab     Elevated PSA ---7.2 improved slightly December 2023--follows up with Dr. Rubio --------had MRI November 2022,--- MRI shows a peer RADS 4 lesion within the right peripheral zone no change or stable, benign prostatic hyperplasia ,  otherwise, has had several biopsies in the past,--mri prostate may 17, 2023 , stable ,     Impaired fasting glucose hemoglobin A1c 6.1 August 5, 2024    Allergic rhinitis, allergies, uses over-the-counter antihistamines, patient will stop Singulair 10 mg daily August 2024    Hypothyroidism, continues  levothyroxine 0.025 mg daily     Colonoscopy March 30, 2023 with Dr. Kailash Borges, 3 small polyps 1 in the ascending and 2 in the transverse all small, 2 of those polyps were tubular adenomas, follow-up again in 3 to 5 years    Elevated liver enzymes--hepatitis panels all -  neg October 9, 2023,        Follow Up   Return in about 6 months (around 2/12/2025).  Patient was given instructions and counseling regarding his condition or for health maintenance advice. Please see specific information pulled into the AVS if appropriate.

## 2024-08-12 NOTE — PROGRESS NOTES
Subjective   The ABCs of the Annual Wellness Visit  Medicare Wellness Visit      Yann Pardo is a 74 y.o. patient who presents for a Medicare Wellness Visit.    The following portions of the patient's history were reviewed and   updated as appropriate: allergies, current medications, past family history, past medical history, past social history, past surgical history, and problem list.    Compared to one year ago, the patient's physical   health is better.  Compared to one year ago, the patient's mental   health is better.    Recent Hospitalizations:  He was not admitted to the hospital during the last year.     Current Medical Providers:  Patient Care Team:  Fabio Ventura MD as PCP - General (Internal Medicine)  Raymundo Rubio MD as Consulting Physician (Urology)    Outpatient Medications Prior to Visit   Medication Sig Dispense Refill    aspirin 81 MG EC tablet Take 1 tablet by mouth Daily.      metoprolol succinate XL (TOPROL-XL) 25 MG 24 hr tablet TAKE 1/2 TABLET BY MOUTH EVERY DAY 45 tablet 1    montelukast (SINGULAIR) 10 MG tablet TAKE 1 TABLET BY MOUTH EVERY NIGHT 90 tablet 3    nitroglycerin (NITROLINGUAL) 0.4 MG/SPRAY spray Place 1 spray under the tongue Every 5 (Five) Minutes As Needed for Chest Pain. 1 each 1    omeprazole (priLOSEC) 40 MG capsule Take 1 capsule by mouth Daily. 90 capsule 3    rosuvastatin (CRESTOR) 40 MG tablet TAKE 1 TABLET BY MOUTH EVERY NIGHT AT BEDTIME 90 tablet 1    vitamin D (ERGOCALCIFEROL) 1.25 MG (04639 UT) capsule capsule TAKE 1 CAPSULE BY MOUTH EVERY WEEK 4 capsule 0    levothyroxine (Synthroid) 25 MCG tablet Take 1 tablet by mouth Daily. 90 tablet 1    levothyroxine (Synthroid) 50 MCG tablet Take 1 tablet by mouth Daily. 90 tablet 3     No facility-administered medications prior to visit.     No opioid medication identified on active medication list. I have reviewed chart for other potential  high risk medication/s and harmful drug interactions in the  "elderly.      Aspirin is on active medication list. Aspirin use is indicated based on review of current medical condition/s. Pros and cons of this therapy have been discussed today. Benefits of this medication outweigh potential harm.  Patient has been encouraged to continue taking this medication.  .      Patient Active Problem List   Diagnosis    CAD (coronary artery disease), native coronary artery    Elevated PSA    High cholesterol    Hyperlipidemia    Mitral regurgitation    Tricuspid regurgitation    Pulmonary atelectasis    PVC (premature ventricular contraction)    Rib fracture    S/P coronary artery stent placement    Subclinical hypothyroidism    Prediabetes    Acute recurrent maxillary sinusitis    Primary hypertension    Acute URI    Mild intermittent asthma    History of colon polyps    IFG (impaired fasting glucose)    VIT D DEF    Elevated LFT    Prostate cancer     Advance Care Planning Advance Directive is on file.  ACP discussion was held with the patient during this visit. Patient has an advance directive in EMR which is still valid.             Objective   Vitals:    08/12/24 1257   Pulse: 66   Temp: 98.2 °F (36.8 °C)   SpO2: 98%   Weight: 84.8 kg (187 lb)   Height: 172.7 cm (67.99\")       Estimated body mass index is 28.44 kg/m² as calculated from the following:    Height as of this encounter: 172.7 cm (67.99\").    Weight as of this encounter: 84.8 kg (187 lb).    BMI is >= 25 and <30. (Overweight) The following options were offered after discussion;: weight loss educational material (shared in after visit summary) and exercise counseling/recommendations       Does the patient have evidence of cognitive impairment? No  Lab Results   Component Value Date    TRIG 185 (H) 08/05/2024    HDL 40 08/05/2024     (H) 08/05/2024    VLDL 32 08/05/2024    HGBA1C 6.10 (H) 08/05/2024                                                                                               Health  Risk " Assessment    Smoking Status:  Social History     Tobacco Use   Smoking Status Former    Current packs/day: 0.00    Average packs/day: 0.5 packs/day for 4.0 years (2.0 ttl pk-yrs)    Types: Cigarettes    Start date: 1969    Quit date: 1973    Years since quittin.6   Smokeless Tobacco Former    Types: Chew    Quit date:      Alcohol Consumption:  Social History     Substance and Sexual Activity   Alcohol Use Yes    Alcohol/week: 6.0 standard drinks of alcohol    Types: 6 Cans of beer per week       Fall Risk Screen  STEADI Fall Risk Assessment was completed, and patient is at LOW risk for falls.Assessment completed on:2024    Depression Screenin/12/2024     1:05 PM   PHQ-2/PHQ-9 Depression Screening   Little Interest or Pleasure in Doing Things 0-->not at all   Feeling Down, Depressed or Hopeless 0-->not at all   PHQ-9: Brief Depression Severity Measure Score 0     Health Habits and Functional and Cognitive Screenin/12/2024     1:00 PM   Functional & Cognitive Status   Do you have difficulty preparing food and eating? No   Do you have difficulty bathing yourself, getting dressed or grooming yourself? No   Do you have difficulty using the toilet? No   Do you have difficulty moving around from place to place? No   Do you have trouble with steps or getting out of a bed or a chair? No   Current Diet Well Balanced Diet   Dental Exam Up to date   Eye Exam Up to date   Exercise (times per week) 7 times per week   Current Exercises Include Other;Walking;Yard Work   Do you need help using the phone?  No   Are you deaf or do you have serious difficulty hearing?  Yes   Do you need help to go to places out of walking distance? No   Do you need help shopping? No   Do you need help preparing meals?  No   Do you need help with housework?  No   Do you need help with laundry? No   Do you need help taking your medications? No   Do you need help managing money? No   Do you ever drive or ride in a  car without wearing a seat belt? No   Have you felt unusual stress, anger or loneliness in the last month? No   Who do you live with? Spouse   If you need help, do you have trouble finding someone available to you? No   Have you been bothered in the last four weeks by sexual problems? No   Do you have difficulty concentrating, remembering or making decisions? No           Age-appropriate Screening Schedule:  Refer to the list below for future screening recommendations based on patient's age, sex and/or medical conditions. Orders for these recommended tests are listed in the plan section. The patient has been provided with a written plan.    Health Maintenance List  Health Maintenance   Topic Date Due    DIABETIC EYE EXAM  Never done    RSV Vaccine - Adults (1 - 1-dose 60+ series) Never done    Pneumococcal Vaccine 65+ (2 of 2 - PPSV23 or PCV20) 07/19/2018    ZOSTER VACCINE (3 of 3) 03/02/2020    ANNUAL WELLNESS VISIT  Never done    DIABETIC FOOT EXAM  Never done    AAA SCREEN (ONE-TIME)  Never done    COVID-19 Vaccine (6 - 2023-24 season) 01/24/2024    URINE MICROALBUMIN  02/03/2024    BMI FOLLOWUP  08/10/2024    INFLUENZA VACCINE  08/01/2024    HEMOGLOBIN A1C  02/05/2025    TDAP/TD VACCINES (2 - Td or Tdap) 04/02/2025    LIPID PANEL  08/05/2025    COLORECTAL CANCER SCREENING  03/30/2026    HEPATITIS C SCREENING  Completed                                                                                                                                                CMS Preventative Services Quick Reference  Risk Factors Identified During Encounter  Alcohol Misuse: Patient encouraged to limit alcohol use to no more than 1 standard alcoholic beverage per day. (12 ounce beer, 6 ounce wine, one shot liquor)    The above risks/problems have been discussed with the patient.  Pertinent information has been shared with the patient in the After Visit Summary.  An After Visit Summary and PPPS were made available to the  patient.    Follow Up:   Next Medicare Wellness visit to be scheduled in 1 year.     Assessment & Plan               Follow Up:   No follow-ups on file.

## 2024-08-13 ENCOUNTER — OFFICE VISIT (OUTPATIENT)
Dept: CARDIAC REHAB | Facility: HOSPITAL | Age: 74
End: 2024-08-13

## 2024-08-13 VITALS
HEART RATE: 51 BPM | OXYGEN SATURATION: 96 % | DIASTOLIC BLOOD PRESSURE: 80 MMHG | SYSTOLIC BLOOD PRESSURE: 124 MMHG | WEIGHT: 185.63 LBS | BODY MASS INDEX: 28.23 KG/M2

## 2024-08-13 DIAGNOSIS — T82.855D STENOSIS OF CORONARY ARTERY STENT, SUBSEQUENT ENCOUNTER: Primary | ICD-10-CM

## 2024-08-15 ENCOUNTER — OFFICE VISIT (OUTPATIENT)
Dept: CARDIAC REHAB | Facility: HOSPITAL | Age: 74
End: 2024-08-15

## 2024-08-15 VITALS
HEART RATE: 63 BPM | WEIGHT: 183.2 LBS | SYSTOLIC BLOOD PRESSURE: 118 MMHG | OXYGEN SATURATION: 96 % | BODY MASS INDEX: 27.86 KG/M2 | DIASTOLIC BLOOD PRESSURE: 80 MMHG

## 2024-08-15 DIAGNOSIS — T82.855D STENOSIS OF CORONARY ARTERY STENT, SUBSEQUENT ENCOUNTER: Primary | ICD-10-CM

## 2024-08-20 ENCOUNTER — OFFICE VISIT (OUTPATIENT)
Dept: CARDIAC REHAB | Facility: HOSPITAL | Age: 74
End: 2024-08-20

## 2024-08-20 VITALS
DIASTOLIC BLOOD PRESSURE: 68 MMHG | HEART RATE: 71 BPM | OXYGEN SATURATION: 97 % | WEIGHT: 184.53 LBS | BODY MASS INDEX: 28.06 KG/M2 | SYSTOLIC BLOOD PRESSURE: 118 MMHG

## 2024-08-20 DIAGNOSIS — T82.855D STENOSIS OF CORONARY ARTERY STENT, SUBSEQUENT ENCOUNTER: Primary | ICD-10-CM

## 2024-08-22 ENCOUNTER — OFFICE VISIT (OUTPATIENT)
Dept: CARDIAC REHAB | Facility: HOSPITAL | Age: 74
End: 2024-08-22

## 2024-08-22 VITALS
HEART RATE: 62 BPM | DIASTOLIC BLOOD PRESSURE: 62 MMHG | WEIGHT: 184.08 LBS | SYSTOLIC BLOOD PRESSURE: 138 MMHG | BODY MASS INDEX: 28 KG/M2 | OXYGEN SATURATION: 97 %

## 2024-08-22 DIAGNOSIS — T82.855D STENOSIS OF CORONARY ARTERY STENT, SUBSEQUENT ENCOUNTER: Primary | ICD-10-CM

## 2024-08-27 ENCOUNTER — OFFICE VISIT (OUTPATIENT)
Dept: CARDIAC REHAB | Facility: HOSPITAL | Age: 74
End: 2024-08-27

## 2024-08-27 VITALS
WEIGHT: 182.32 LBS | DIASTOLIC BLOOD PRESSURE: 60 MMHG | HEART RATE: 61 BPM | SYSTOLIC BLOOD PRESSURE: 104 MMHG | BODY MASS INDEX: 27.73 KG/M2 | OXYGEN SATURATION: 95 %

## 2024-08-27 DIAGNOSIS — T82.855D STENOSIS OF CORONARY ARTERY STENT, SUBSEQUENT ENCOUNTER: Primary | ICD-10-CM

## 2024-08-28 DIAGNOSIS — E55.9 VITAMIN D DEFICIENCY: ICD-10-CM

## 2024-08-28 RX ORDER — ERGOCALCIFEROL 1.25 MG/1
CAPSULE, LIQUID FILLED ORAL
Qty: 4 CAPSULE | Refills: 0 | Status: SHIPPED | OUTPATIENT
Start: 2024-08-28

## 2024-08-30 ENCOUNTER — TELEPHONE (OUTPATIENT)
Dept: INTERNAL MEDICINE | Age: 74
End: 2024-08-30
Payer: MEDICARE

## 2024-09-03 ENCOUNTER — OFFICE VISIT (OUTPATIENT)
Dept: CARDIAC REHAB | Facility: HOSPITAL | Age: 74
End: 2024-09-03

## 2024-09-03 VITALS
WEIGHT: 184.08 LBS | DIASTOLIC BLOOD PRESSURE: 62 MMHG | SYSTOLIC BLOOD PRESSURE: 106 MMHG | OXYGEN SATURATION: 95 % | HEART RATE: 55 BPM | BODY MASS INDEX: 28 KG/M2

## 2024-09-03 DIAGNOSIS — T82.855D STENOSIS OF CORONARY ARTERY STENT, SUBSEQUENT ENCOUNTER: Primary | ICD-10-CM

## 2024-09-05 ENCOUNTER — OFFICE VISIT (OUTPATIENT)
Dept: CARDIAC REHAB | Facility: HOSPITAL | Age: 74
End: 2024-09-05

## 2024-09-05 VITALS
WEIGHT: 183.64 LBS | OXYGEN SATURATION: 93 % | SYSTOLIC BLOOD PRESSURE: 110 MMHG | DIASTOLIC BLOOD PRESSURE: 72 MMHG | HEART RATE: 56 BPM | BODY MASS INDEX: 27.93 KG/M2

## 2024-09-05 DIAGNOSIS — T82.855D STENOSIS OF CORONARY ARTERY STENT, SUBSEQUENT ENCOUNTER: Primary | ICD-10-CM

## 2024-09-10 ENCOUNTER — OFFICE VISIT (OUTPATIENT)
Dept: CARDIAC REHAB | Facility: HOSPITAL | Age: 74
End: 2024-09-10

## 2024-09-10 VITALS
SYSTOLIC BLOOD PRESSURE: 120 MMHG | OXYGEN SATURATION: 97 % | DIASTOLIC BLOOD PRESSURE: 72 MMHG | BODY MASS INDEX: 27.9 KG/M2 | WEIGHT: 183.42 LBS | HEART RATE: 66 BPM

## 2024-09-10 DIAGNOSIS — T82.855D STENOSIS OF CORONARY ARTERY STENT, SUBSEQUENT ENCOUNTER: Primary | ICD-10-CM

## 2024-09-12 ENCOUNTER — OFFICE VISIT (OUTPATIENT)
Dept: CARDIAC REHAB | Facility: HOSPITAL | Age: 74
End: 2024-09-12

## 2024-09-12 VITALS
OXYGEN SATURATION: 95 % | HEART RATE: 54 BPM | BODY MASS INDEX: 27.63 KG/M2 | SYSTOLIC BLOOD PRESSURE: 110 MMHG | WEIGHT: 181.66 LBS | DIASTOLIC BLOOD PRESSURE: 70 MMHG

## 2024-09-12 DIAGNOSIS — T82.855D STENOSIS OF CORONARY ARTERY STENT, SUBSEQUENT ENCOUNTER: Primary | ICD-10-CM

## 2024-09-17 ENCOUNTER — APPOINTMENT (OUTPATIENT)
Dept: CARDIAC REHAB | Facility: HOSPITAL | Age: 74
End: 2024-09-17

## 2024-09-19 ENCOUNTER — APPOINTMENT (OUTPATIENT)
Dept: CARDIAC REHAB | Facility: HOSPITAL | Age: 74
End: 2024-09-19

## 2024-09-24 ENCOUNTER — APPOINTMENT (OUTPATIENT)
Dept: CARDIAC REHAB | Facility: HOSPITAL | Age: 74
End: 2024-09-24

## 2024-09-26 ENCOUNTER — APPOINTMENT (OUTPATIENT)
Dept: CARDIAC REHAB | Facility: HOSPITAL | Age: 74
End: 2024-09-26

## 2024-10-01 ENCOUNTER — APPOINTMENT (OUTPATIENT)
Dept: CARDIAC REHAB | Facility: HOSPITAL | Age: 74
End: 2024-10-01

## 2024-10-01 VITALS
HEART RATE: 60 BPM | DIASTOLIC BLOOD PRESSURE: 82 MMHG | BODY MASS INDEX: 28.03 KG/M2 | OXYGEN SATURATION: 98 % | SYSTOLIC BLOOD PRESSURE: 128 MMHG | WEIGHT: 184.3 LBS

## 2024-10-01 DIAGNOSIS — T82.855D STENOSIS OF CORONARY ARTERY STENT, SUBSEQUENT ENCOUNTER: Primary | ICD-10-CM

## 2024-10-03 ENCOUNTER — OFFICE VISIT (OUTPATIENT)
Dept: CARDIAC REHAB | Facility: HOSPITAL | Age: 74
End: 2024-10-03

## 2024-10-03 VITALS
DIASTOLIC BLOOD PRESSURE: 80 MMHG | OXYGEN SATURATION: 96 % | WEIGHT: 186.29 LBS | SYSTOLIC BLOOD PRESSURE: 130 MMHG | HEART RATE: 56 BPM | BODY MASS INDEX: 28.33 KG/M2

## 2024-10-03 DIAGNOSIS — T82.855D STENOSIS OF CORONARY ARTERY STENT, SUBSEQUENT ENCOUNTER: Primary | ICD-10-CM

## 2024-10-08 ENCOUNTER — OFFICE VISIT (OUTPATIENT)
Dept: CARDIAC REHAB | Facility: HOSPITAL | Age: 74
End: 2024-10-08

## 2024-10-08 VITALS
BODY MASS INDEX: 28.13 KG/M2 | SYSTOLIC BLOOD PRESSURE: 110 MMHG | WEIGHT: 184.97 LBS | DIASTOLIC BLOOD PRESSURE: 68 MMHG | OXYGEN SATURATION: 96 % | HEART RATE: 59 BPM

## 2024-10-08 DIAGNOSIS — T82.855D STENOSIS OF CORONARY ARTERY STENT, SUBSEQUENT ENCOUNTER: Primary | ICD-10-CM

## 2024-10-10 ENCOUNTER — OFFICE VISIT (OUTPATIENT)
Dept: CARDIAC REHAB | Facility: HOSPITAL | Age: 74
End: 2024-10-10

## 2024-10-10 VITALS
WEIGHT: 185.85 LBS | HEART RATE: 59 BPM | OXYGEN SATURATION: 96 % | DIASTOLIC BLOOD PRESSURE: 78 MMHG | SYSTOLIC BLOOD PRESSURE: 120 MMHG | BODY MASS INDEX: 28.26 KG/M2

## 2024-10-10 DIAGNOSIS — T82.855D STENOSIS OF CORONARY ARTERY STENT, SUBSEQUENT ENCOUNTER: Primary | ICD-10-CM

## 2024-10-15 ENCOUNTER — APPOINTMENT (OUTPATIENT)
Dept: CARDIAC REHAB | Facility: HOSPITAL | Age: 74
End: 2024-10-15

## 2024-10-17 ENCOUNTER — APPOINTMENT (OUTPATIENT)
Dept: CARDIAC REHAB | Facility: HOSPITAL | Age: 74
End: 2024-10-17

## 2024-10-22 ENCOUNTER — OFFICE VISIT (OUTPATIENT)
Dept: CARDIAC REHAB | Facility: HOSPITAL | Age: 74
End: 2024-10-22

## 2024-10-22 VITALS
HEART RATE: 71 BPM | SYSTOLIC BLOOD PRESSURE: 120 MMHG | DIASTOLIC BLOOD PRESSURE: 80 MMHG | BODY MASS INDEX: 28.53 KG/M2 | WEIGHT: 187.61 LBS | OXYGEN SATURATION: 97 %

## 2024-10-22 DIAGNOSIS — T82.855D STENOSIS OF CORONARY ARTERY STENT, SUBSEQUENT ENCOUNTER: Primary | ICD-10-CM

## 2024-10-24 ENCOUNTER — OFFICE VISIT (OUTPATIENT)
Dept: CARDIAC REHAB | Facility: HOSPITAL | Age: 74
End: 2024-10-24

## 2024-10-24 VITALS
DIASTOLIC BLOOD PRESSURE: 80 MMHG | WEIGHT: 186.07 LBS | OXYGEN SATURATION: 96 % | HEART RATE: 62 BPM | SYSTOLIC BLOOD PRESSURE: 112 MMHG | BODY MASS INDEX: 28.3 KG/M2

## 2024-10-24 DIAGNOSIS — T82.855D STENOSIS OF CORONARY ARTERY STENT, SUBSEQUENT ENCOUNTER: Primary | ICD-10-CM

## 2024-10-24 PROCEDURE — 93798 PHYS/QHP OP CAR RHAB W/ECG: CPT

## 2024-10-29 ENCOUNTER — OFFICE VISIT (OUTPATIENT)
Dept: CARDIAC REHAB | Facility: HOSPITAL | Age: 74
End: 2024-10-29

## 2024-10-29 VITALS
SYSTOLIC BLOOD PRESSURE: 130 MMHG | HEART RATE: 64 BPM | OXYGEN SATURATION: 96 % | DIASTOLIC BLOOD PRESSURE: 78 MMHG | BODY MASS INDEX: 28.33 KG/M2 | WEIGHT: 186.29 LBS

## 2024-10-29 DIAGNOSIS — T82.855D STENOSIS OF CORONARY ARTERY STENT, SUBSEQUENT ENCOUNTER: Primary | ICD-10-CM

## 2024-10-31 ENCOUNTER — OFFICE VISIT (OUTPATIENT)
Dept: CARDIAC REHAB | Facility: HOSPITAL | Age: 74
End: 2024-10-31

## 2024-10-31 VITALS
OXYGEN SATURATION: 97 % | SYSTOLIC BLOOD PRESSURE: 132 MMHG | DIASTOLIC BLOOD PRESSURE: 64 MMHG | WEIGHT: 185.63 LBS | BODY MASS INDEX: 28.23 KG/M2 | HEART RATE: 68 BPM

## 2024-10-31 DIAGNOSIS — T82.855D STENOSIS OF CORONARY ARTERY STENT, SUBSEQUENT ENCOUNTER: Primary | ICD-10-CM

## 2024-11-05 ENCOUNTER — OFFICE VISIT (OUTPATIENT)
Dept: CARDIAC REHAB | Facility: HOSPITAL | Age: 74
End: 2024-11-05

## 2024-11-05 VITALS
HEART RATE: 59 BPM | DIASTOLIC BLOOD PRESSURE: 82 MMHG | BODY MASS INDEX: 28.03 KG/M2 | OXYGEN SATURATION: 96 % | SYSTOLIC BLOOD PRESSURE: 116 MMHG | WEIGHT: 184.3 LBS

## 2024-11-05 DIAGNOSIS — T82.855D STENOSIS OF CORONARY ARTERY STENT, SUBSEQUENT ENCOUNTER: Primary | ICD-10-CM

## 2024-11-07 ENCOUNTER — OFFICE VISIT (OUTPATIENT)
Dept: CARDIAC REHAB | Facility: HOSPITAL | Age: 74
End: 2024-11-07

## 2024-11-07 VITALS
HEART RATE: 60 BPM | SYSTOLIC BLOOD PRESSURE: 112 MMHG | BODY MASS INDEX: 28.2 KG/M2 | OXYGEN SATURATION: 100 % | DIASTOLIC BLOOD PRESSURE: 72 MMHG | WEIGHT: 185.41 LBS

## 2024-11-07 DIAGNOSIS — T82.855D STENOSIS OF CORONARY ARTERY STENT, SUBSEQUENT ENCOUNTER: Primary | ICD-10-CM

## 2024-11-11 DIAGNOSIS — E55.9 VITAMIN D DEFICIENCY: ICD-10-CM

## 2024-11-11 RX ORDER — ERGOCALCIFEROL 1.25 MG/1
CAPSULE, LIQUID FILLED ORAL
Qty: 4 CAPSULE | Refills: 0 | Status: SHIPPED | OUTPATIENT
Start: 2024-11-11

## 2024-11-12 ENCOUNTER — OFFICE VISIT (OUTPATIENT)
Dept: CARDIAC REHAB | Facility: HOSPITAL | Age: 74
End: 2024-11-12

## 2024-11-12 VITALS
WEIGHT: 187.83 LBS | HEART RATE: 71 BPM | SYSTOLIC BLOOD PRESSURE: 120 MMHG | DIASTOLIC BLOOD PRESSURE: 70 MMHG | OXYGEN SATURATION: 98 % | BODY MASS INDEX: 28.57 KG/M2

## 2024-11-12 DIAGNOSIS — T82.855D STENOSIS OF CORONARY ARTERY STENT, SUBSEQUENT ENCOUNTER: Primary | ICD-10-CM

## 2024-11-14 ENCOUNTER — OFFICE VISIT (OUTPATIENT)
Dept: CARDIAC REHAB | Facility: HOSPITAL | Age: 74
End: 2024-11-14

## 2024-11-14 VITALS
DIASTOLIC BLOOD PRESSURE: 74 MMHG | HEART RATE: 72 BPM | OXYGEN SATURATION: 97 % | BODY MASS INDEX: 28.57 KG/M2 | WEIGHT: 187.83 LBS | SYSTOLIC BLOOD PRESSURE: 140 MMHG

## 2024-11-14 DIAGNOSIS — T82.855D STENOSIS OF CORONARY ARTERY STENT, SUBSEQUENT ENCOUNTER: Primary | ICD-10-CM

## 2024-11-19 ENCOUNTER — OFFICE VISIT (OUTPATIENT)
Dept: CARDIAC REHAB | Facility: HOSPITAL | Age: 74
End: 2024-11-19

## 2024-11-19 VITALS
OXYGEN SATURATION: 95 % | BODY MASS INDEX: 28.37 KG/M2 | HEART RATE: 63 BPM | SYSTOLIC BLOOD PRESSURE: 110 MMHG | WEIGHT: 186.51 LBS | DIASTOLIC BLOOD PRESSURE: 60 MMHG

## 2024-11-19 DIAGNOSIS — T82.855D STENOSIS OF CORONARY ARTERY STENT, SUBSEQUENT ENCOUNTER: Primary | ICD-10-CM

## 2024-11-21 ENCOUNTER — OFFICE VISIT (OUTPATIENT)
Dept: CARDIAC REHAB | Facility: HOSPITAL | Age: 74
End: 2024-11-21

## 2024-11-21 VITALS
BODY MASS INDEX: 28.43 KG/M2 | HEART RATE: 64 BPM | SYSTOLIC BLOOD PRESSURE: 148 MMHG | DIASTOLIC BLOOD PRESSURE: 72 MMHG | OXYGEN SATURATION: 97 % | WEIGHT: 186.95 LBS

## 2024-11-21 DIAGNOSIS — T82.855D STENOSIS OF CORONARY ARTERY STENT, SUBSEQUENT ENCOUNTER: Primary | ICD-10-CM

## 2024-11-26 ENCOUNTER — OFFICE VISIT (OUTPATIENT)
Dept: CARDIAC REHAB | Facility: HOSPITAL | Age: 74
End: 2024-11-26

## 2024-11-26 VITALS
DIASTOLIC BLOOD PRESSURE: 70 MMHG | OXYGEN SATURATION: 97 % | HEART RATE: 56 BPM | WEIGHT: 188.27 LBS | SYSTOLIC BLOOD PRESSURE: 120 MMHG | BODY MASS INDEX: 28.63 KG/M2

## 2024-11-26 DIAGNOSIS — T82.855D STENOSIS OF CORONARY ARTERY STENT, SUBSEQUENT ENCOUNTER: Primary | ICD-10-CM

## 2024-12-03 ENCOUNTER — LAB (OUTPATIENT)
Dept: LAB | Facility: HOSPITAL | Age: 74
End: 2024-12-03
Payer: MEDICARE

## 2024-12-03 ENCOUNTER — OFFICE VISIT (OUTPATIENT)
Dept: CARDIAC REHAB | Facility: HOSPITAL | Age: 74
End: 2024-12-03

## 2024-12-03 VITALS
DIASTOLIC BLOOD PRESSURE: 70 MMHG | WEIGHT: 191.8 LBS | HEART RATE: 58 BPM | BODY MASS INDEX: 29.17 KG/M2 | SYSTOLIC BLOOD PRESSURE: 120 MMHG | OXYGEN SATURATION: 99 %

## 2024-12-03 DIAGNOSIS — C61 PROSTATE CANCER: ICD-10-CM

## 2024-12-03 DIAGNOSIS — Z95.5 S/P CORONARY ARTERY STENT PLACEMENT: ICD-10-CM

## 2024-12-03 DIAGNOSIS — E03.8 SUBCLINICAL HYPOTHYROIDISM: ICD-10-CM

## 2024-12-03 DIAGNOSIS — J45.20 MILD INTERMITTENT ASTHMA, UNSPECIFIED WHETHER COMPLICATED: ICD-10-CM

## 2024-12-03 DIAGNOSIS — R97.20 ELEVATED PSA: ICD-10-CM

## 2024-12-03 DIAGNOSIS — Z86.0100 HISTORY OF COLON POLYPS: ICD-10-CM

## 2024-12-03 DIAGNOSIS — I34.0 MITRAL VALVE INSUFFICIENCY, UNSPECIFIED ETIOLOGY: ICD-10-CM

## 2024-12-03 DIAGNOSIS — E55.9 VITAMIN D DEFICIENCY: ICD-10-CM

## 2024-12-03 DIAGNOSIS — R74.8 ELEVATED LIVER ENZYMES: ICD-10-CM

## 2024-12-03 DIAGNOSIS — I10 PRIMARY HYPERTENSION: ICD-10-CM

## 2024-12-03 DIAGNOSIS — Z00.00 MEDICARE ANNUAL WELLNESS VISIT, SUBSEQUENT: ICD-10-CM

## 2024-12-03 DIAGNOSIS — T82.855D STENOSIS OF CORONARY ARTERY STENT, SUBSEQUENT ENCOUNTER: Primary | ICD-10-CM

## 2024-12-03 DIAGNOSIS — E78.00 PURE HYPERCHOLESTEROLEMIA: ICD-10-CM

## 2024-12-03 DIAGNOSIS — R73.01 IFG (IMPAIRED FASTING GLUCOSE): ICD-10-CM

## 2024-12-03 LAB — PSA SERPL-MCNC: 8.09 NG/ML (ref 0–4)

## 2024-12-03 PROCEDURE — 84153 ASSAY OF PSA TOTAL: CPT

## 2024-12-03 PROCEDURE — 36415 COLL VENOUS BLD VENIPUNCTURE: CPT

## 2024-12-05 ENCOUNTER — OFFICE VISIT (OUTPATIENT)
Dept: CARDIAC REHAB | Facility: HOSPITAL | Age: 74
End: 2024-12-05

## 2024-12-05 VITALS
SYSTOLIC BLOOD PRESSURE: 112 MMHG | BODY MASS INDEX: 28.8 KG/M2 | WEIGHT: 189.38 LBS | DIASTOLIC BLOOD PRESSURE: 80 MMHG | OXYGEN SATURATION: 97 % | HEART RATE: 74 BPM

## 2024-12-05 DIAGNOSIS — T82.855D STENOSIS OF CORONARY ARTERY STENT, SUBSEQUENT ENCOUNTER: Primary | ICD-10-CM

## 2024-12-09 NOTE — PROGRESS NOTES
Chief Complaint    Urologic complaint    Subjective          Yann Pardo presents to [facility] UROLOGY  History of Present Illness       74 year old  gentleman     Elevated PSA        Voiding ok.  No change   frequency. every 2 hours  Noc 1-2     No GH    No prostate meds      PVR       000        No trouble with ED  Patient does have decreased ejaculate from prior, he notices after his prostate biopsy      No urologic family history.  No family history of prostate cancer.    Mother  at 92 and father  at 86.    s/p cardiac stent.  Patient does not smoke. Baby aspirin daily.        PSA         8.0         7.2   PSAd  0.11    2024 MRI prostate - 61 g  PI-RADS 4-posterior peripheral zone base on right.  Touches posterior lateral capsule surface no definitive extracapsular extension, 11 x 5 x 10 mm, unchanged.  Negative otherwise       7.2    2023 MRI prostate - 57 g  PI-RADS 4 -11 mm, right posterior lateral mid to base peripheral zone lesion.  Stable for the last 3 years.  Chronic prostatitis          8.7       2022 MRI prostate-60 g  PI-RADS 4-right peripheral zone 10 x 4 mm.  Stable from prior exam.  No extraprostatic extension.  Posterior marginal abutment.       7.3       7.2       11/15/2021 MRI prostate 65 g-PI-RADS 4 lesion right posterior lateral peripheral zone.  Stable in size.  11 mm      6.58    10/28/2020 MRI fusion prostate biopsy - right base focal high-grade PIN, negative otherwise    10/20 prostate MRI  49 g - lesion in the right posterior mid to base peripheral zone PIRADS 4 - 11 mm .  More indeterminate lesion in the anterior mid left peripheral zone 16mm, PIRADS 3      9.51  3/20 prostate biopsy - 49 g -  neg     6.89       5.4, percent free 20% (23%)     6.09  11/10  1.8  10/09  1.6      1.7            Past History:  Medical History: has a past medical history of Allergic,  Asthma, Bronchospasm, Bronchospasm, CAD (coronary artery disease), Colon polyp, Diverticulosis, Elevated PSA, GERD (gastroesophageal reflux disease), Heart disease, Hemorrhoids, Hyperglycemia, Hyperlipidemia, Hypertension, Lung collapse, Mitral regurgitation, Rib fracture, Sinus bradycardia, Sinus bradycardia, Sinusitis, and Tubular adenoma.   Surgical History: has a past surgical history that includes Coronary stent placement; Colonoscopy; Colonoscopy; Coronary stent placement; Hernia repair; Portacath placement; Prostate biopsy; Colonoscopy (N/A, 03/30/2023); and Eye surgery (Bilateral).   Family History: family history includes Coronary artery disease in an other family member; Kidney cancer in his father.   Social History: reports that he quit smoking about 51 years ago. His smoking use included cigarettes. He started smoking about 55 years ago. He has a 2 pack-year smoking history. He quit smokeless tobacco use about 39 years ago.  His smokeless tobacco use included chew. He reports current alcohol use of about 6.0 standard drinks of alcohol per week. He reports that he does not use drugs.  Allergies: Patient has no known allergies.       Current Outpatient Medications:     aspirin 81 MG EC tablet, Take 1 tablet by mouth Daily., Disp: , Rfl:     erythromycin with ethanol (THERAMYCIN) 2 % external solution, Apply  topically to the appropriate area as directed Daily., Disp: 15 mL, Rfl: 5    levothyroxine (Synthroid) 25 MCG tablet, Take 1 tablet by mouth Daily., Disp: 90 tablet, Rfl: 1    metoprolol succinate XL (TOPROL-XL) 25 MG 24 hr tablet, TAKE 1/2 TABLET BY MOUTH EVERY DAY, Disp: 45 tablet, Rfl: 1    nitroglycerin (NITROLINGUAL) 0.4 MG/SPRAY spray, Place 1 spray under the tongue Every 5 (Five) Minutes As Needed for Chest Pain., Disp: 1 each, Rfl: 1    omeprazole (priLOSEC) 40 MG capsule, Take 1 capsule by mouth Daily., Disp: 90 capsule, Rfl: 3    rosuvastatin (CRESTOR) 40 MG tablet, TAKE 1 TABLET BY MOUTH  EVERY NIGHT AT BEDTIME, Disp: 90 tablet, Rfl: 1    vitamin D (ERGOCALCIFEROL) 1.25 MG (95896 UT) capsule capsule, TAKE 1 CAPSULE BY MOUTH EVERY WEEK, Disp: 4 capsule, Rfl: 0            Objective     Vital Signs:   There were no vitals taken for this visit.             Assessment and Plan    Diagnoses and all orders for this visit:    1. Elevated PSA (Primary)      Elevated PSA            Discussed repeat biopsy versus continued conservative monitoring.  After discussion we will continue conservative monitoring    F/u in 6 months with PSA and MRI prostate

## 2024-12-10 ENCOUNTER — OFFICE VISIT (OUTPATIENT)
Dept: CARDIAC REHAB | Facility: HOSPITAL | Age: 74
End: 2024-12-10

## 2024-12-10 VITALS
WEIGHT: 190.48 LBS | HEART RATE: 70 BPM | DIASTOLIC BLOOD PRESSURE: 80 MMHG | SYSTOLIC BLOOD PRESSURE: 130 MMHG | OXYGEN SATURATION: 98 % | BODY MASS INDEX: 28.97 KG/M2

## 2024-12-10 DIAGNOSIS — T82.855D STENOSIS OF CORONARY ARTERY STENT, SUBSEQUENT ENCOUNTER: Primary | ICD-10-CM

## 2024-12-12 ENCOUNTER — OFFICE VISIT (OUTPATIENT)
Dept: CARDIAC REHAB | Facility: HOSPITAL | Age: 74
End: 2024-12-12

## 2024-12-12 VITALS
SYSTOLIC BLOOD PRESSURE: 128 MMHG | OXYGEN SATURATION: 98 % | BODY MASS INDEX: 28.53 KG/M2 | WEIGHT: 187.61 LBS | DIASTOLIC BLOOD PRESSURE: 70 MMHG | HEART RATE: 71 BPM

## 2024-12-12 DIAGNOSIS — T82.855D STENOSIS OF CORONARY ARTERY STENT, SUBSEQUENT ENCOUNTER: Primary | ICD-10-CM

## 2024-12-13 ENCOUNTER — OFFICE VISIT (OUTPATIENT)
Dept: UROLOGY | Age: 74
End: 2024-12-13
Payer: MEDICARE

## 2024-12-13 VITALS — BODY MASS INDEX: 28.34 KG/M2 | HEIGHT: 68 IN | WEIGHT: 187 LBS

## 2024-12-13 DIAGNOSIS — R97.20 ELEVATED PSA: Primary | ICD-10-CM

## 2024-12-13 DIAGNOSIS — E55.9 VITAMIN D DEFICIENCY: ICD-10-CM

## 2024-12-13 LAB — URINE VOLUME: NORMAL

## 2024-12-13 RX ORDER — ERGOCALCIFEROL 1.25 MG/1
CAPSULE, LIQUID FILLED ORAL
Qty: 4 CAPSULE | Refills: 0 | Status: SHIPPED | OUTPATIENT
Start: 2024-12-13

## 2024-12-13 RX ORDER — ROSUVASTATIN CALCIUM 40 MG/1
TABLET, COATED ORAL
Qty: 90 TABLET | Refills: 0 | Status: SHIPPED | OUTPATIENT
Start: 2024-12-13

## 2024-12-16 ENCOUNTER — TELEPHONE (OUTPATIENT)
Dept: INTERNAL MEDICINE | Age: 74
End: 2024-12-16
Payer: MEDICARE

## 2024-12-16 DIAGNOSIS — I34.0 MITRAL VALVE INSUFFICIENCY, UNSPECIFIED ETIOLOGY: ICD-10-CM

## 2024-12-16 DIAGNOSIS — I25.10 CORONARY ARTERY DISEASE INVOLVING NATIVE CORONARY ARTERY OF NATIVE HEART WITHOUT ANGINA PECTORIS: ICD-10-CM

## 2024-12-16 DIAGNOSIS — Z95.5 S/P CORONARY ARTERY STENT PLACEMENT: Primary | ICD-10-CM

## 2024-12-17 ENCOUNTER — OFFICE VISIT (OUTPATIENT)
Dept: CARDIAC REHAB | Facility: HOSPITAL | Age: 74
End: 2024-12-17

## 2024-12-17 VITALS
SYSTOLIC BLOOD PRESSURE: 120 MMHG | OXYGEN SATURATION: 98 % | HEART RATE: 58 BPM | DIASTOLIC BLOOD PRESSURE: 80 MMHG | BODY MASS INDEX: 28.2 KG/M2 | WEIGHT: 185.41 LBS

## 2024-12-17 DIAGNOSIS — T82.855D STENOSIS OF CORONARY ARTERY STENT, SUBSEQUENT ENCOUNTER: Primary | ICD-10-CM

## 2024-12-19 ENCOUNTER — OFFICE VISIT (OUTPATIENT)
Dept: CARDIAC REHAB | Facility: HOSPITAL | Age: 74
End: 2024-12-19

## 2024-12-19 VITALS
SYSTOLIC BLOOD PRESSURE: 120 MMHG | WEIGHT: 188.71 LBS | HEART RATE: 72 BPM | DIASTOLIC BLOOD PRESSURE: 68 MMHG | OXYGEN SATURATION: 98 % | BODY MASS INDEX: 28.7 KG/M2

## 2024-12-19 DIAGNOSIS — T82.855D STENOSIS OF CORONARY ARTERY STENT, SUBSEQUENT ENCOUNTER: Primary | ICD-10-CM

## 2024-12-26 ENCOUNTER — OFFICE VISIT (OUTPATIENT)
Dept: CARDIAC REHAB | Facility: HOSPITAL | Age: 74
End: 2024-12-26

## 2024-12-26 VITALS
WEIGHT: 190.48 LBS | DIASTOLIC BLOOD PRESSURE: 68 MMHG | SYSTOLIC BLOOD PRESSURE: 122 MMHG | OXYGEN SATURATION: 97 % | BODY MASS INDEX: 28.97 KG/M2 | HEART RATE: 61 BPM

## 2024-12-26 DIAGNOSIS — T82.855D STENOSIS OF CORONARY ARTERY STENT, SUBSEQUENT ENCOUNTER: Primary | ICD-10-CM

## 2024-12-31 ENCOUNTER — OFFICE VISIT (OUTPATIENT)
Dept: CARDIAC REHAB | Facility: HOSPITAL | Age: 74
End: 2024-12-31

## 2024-12-31 VITALS
SYSTOLIC BLOOD PRESSURE: 140 MMHG | BODY MASS INDEX: 29 KG/M2 | DIASTOLIC BLOOD PRESSURE: 70 MMHG | OXYGEN SATURATION: 93 % | WEIGHT: 190.7 LBS | HEART RATE: 60 BPM

## 2024-12-31 DIAGNOSIS — T82.855D STENOSIS OF CORONARY ARTERY STENT, SUBSEQUENT ENCOUNTER: Primary | ICD-10-CM

## 2025-01-02 ENCOUNTER — OFFICE VISIT (OUTPATIENT)
Dept: CARDIAC REHAB | Facility: HOSPITAL | Age: 75
End: 2025-01-02

## 2025-01-02 VITALS
SYSTOLIC BLOOD PRESSURE: 130 MMHG | BODY MASS INDEX: 28.97 KG/M2 | DIASTOLIC BLOOD PRESSURE: 72 MMHG | OXYGEN SATURATION: 97 % | HEART RATE: 66 BPM | WEIGHT: 190.48 LBS

## 2025-01-02 DIAGNOSIS — T82.855D STENOSIS OF CORONARY ARTERY STENT, SUBSEQUENT ENCOUNTER: Primary | ICD-10-CM

## 2025-01-09 ENCOUNTER — OFFICE VISIT (OUTPATIENT)
Dept: CARDIAC REHAB | Facility: HOSPITAL | Age: 75
End: 2025-01-09

## 2025-01-09 VITALS — HEART RATE: 59 BPM | SYSTOLIC BLOOD PRESSURE: 150 MMHG | DIASTOLIC BLOOD PRESSURE: 80 MMHG | OXYGEN SATURATION: 97 %

## 2025-01-09 DIAGNOSIS — T82.855D STENOSIS OF CORONARY ARTERY STENT, SUBSEQUENT ENCOUNTER: Primary | ICD-10-CM

## 2025-01-14 ENCOUNTER — OFFICE VISIT (OUTPATIENT)
Dept: CARDIAC REHAB | Facility: HOSPITAL | Age: 75
End: 2025-01-14

## 2025-01-14 VITALS
DIASTOLIC BLOOD PRESSURE: 80 MMHG | BODY MASS INDEX: 28.94 KG/M2 | WEIGHT: 190.26 LBS | OXYGEN SATURATION: 99 % | SYSTOLIC BLOOD PRESSURE: 140 MMHG | HEART RATE: 65 BPM

## 2025-01-14 DIAGNOSIS — T82.855D STENOSIS OF CORONARY ARTERY STENT, SUBSEQUENT ENCOUNTER: Primary | ICD-10-CM

## 2025-01-14 DIAGNOSIS — E55.9 VITAMIN D DEFICIENCY: ICD-10-CM

## 2025-01-15 RX ORDER — ERGOCALCIFEROL 1.25 MG/1
CAPSULE, LIQUID FILLED ORAL
Qty: 4 CAPSULE | Refills: 0 | Status: SHIPPED | OUTPATIENT
Start: 2025-01-15

## 2025-01-16 ENCOUNTER — OFFICE VISIT (OUTPATIENT)
Dept: CARDIAC REHAB | Facility: HOSPITAL | Age: 75
End: 2025-01-16

## 2025-01-16 VITALS — SYSTOLIC BLOOD PRESSURE: 118 MMHG | HEART RATE: 57 BPM | OXYGEN SATURATION: 100 % | DIASTOLIC BLOOD PRESSURE: 62 MMHG

## 2025-01-16 DIAGNOSIS — T82.855D STENOSIS OF CORONARY ARTERY STENT, SUBSEQUENT ENCOUNTER: Primary | ICD-10-CM

## 2025-01-21 ENCOUNTER — OFFICE VISIT (OUTPATIENT)
Dept: CARDIAC REHAB | Facility: HOSPITAL | Age: 75
End: 2025-01-21

## 2025-01-21 VITALS — HEART RATE: 61 BPM | DIASTOLIC BLOOD PRESSURE: 80 MMHG | OXYGEN SATURATION: 97 % | SYSTOLIC BLOOD PRESSURE: 120 MMHG

## 2025-01-21 DIAGNOSIS — T82.855D STENOSIS OF CORONARY ARTERY STENT, SUBSEQUENT ENCOUNTER: Primary | ICD-10-CM

## 2025-01-23 ENCOUNTER — OFFICE VISIT (OUTPATIENT)
Dept: CARDIAC REHAB | Facility: HOSPITAL | Age: 75
End: 2025-01-23

## 2025-01-23 VITALS — DIASTOLIC BLOOD PRESSURE: 80 MMHG | HEART RATE: 59 BPM | OXYGEN SATURATION: 94 % | SYSTOLIC BLOOD PRESSURE: 124 MMHG

## 2025-01-23 DIAGNOSIS — T82.855D STENOSIS OF CORONARY ARTERY STENT, SUBSEQUENT ENCOUNTER: Primary | ICD-10-CM

## 2025-01-28 ENCOUNTER — OFFICE VISIT (OUTPATIENT)
Dept: CARDIAC REHAB | Facility: HOSPITAL | Age: 75
End: 2025-01-28

## 2025-01-28 VITALS
BODY MASS INDEX: 28.8 KG/M2 | DIASTOLIC BLOOD PRESSURE: 78 MMHG | WEIGHT: 189.38 LBS | HEART RATE: 62 BPM | SYSTOLIC BLOOD PRESSURE: 138 MMHG | OXYGEN SATURATION: 97 %

## 2025-01-28 DIAGNOSIS — T82.855D STENOSIS OF CORONARY ARTERY STENT, SUBSEQUENT ENCOUNTER: Primary | ICD-10-CM

## 2025-01-30 ENCOUNTER — OFFICE VISIT (OUTPATIENT)
Dept: CARDIAC REHAB | Facility: HOSPITAL | Age: 75
End: 2025-01-30

## 2025-01-30 VITALS
BODY MASS INDEX: 28.8 KG/M2 | SYSTOLIC BLOOD PRESSURE: 120 MMHG | DIASTOLIC BLOOD PRESSURE: 70 MMHG | OXYGEN SATURATION: 97 % | HEART RATE: 68 BPM | WEIGHT: 189.38 LBS

## 2025-01-30 DIAGNOSIS — T82.855D STENOSIS OF CORONARY ARTERY STENT, SUBSEQUENT ENCOUNTER: Primary | ICD-10-CM

## 2025-01-31 ENCOUNTER — LAB (OUTPATIENT)
Dept: LAB | Facility: HOSPITAL | Age: 75
End: 2025-01-31
Payer: MEDICARE

## 2025-01-31 DIAGNOSIS — Z95.5 S/P CORONARY ARTERY STENT PLACEMENT: ICD-10-CM

## 2025-01-31 DIAGNOSIS — Z86.0100 HISTORY OF COLON POLYPS: ICD-10-CM

## 2025-01-31 DIAGNOSIS — R74.8 ELEVATED LIVER ENZYMES: ICD-10-CM

## 2025-01-31 DIAGNOSIS — E55.9 VITAMIN D DEFICIENCY: ICD-10-CM

## 2025-01-31 DIAGNOSIS — Z00.00 MEDICARE ANNUAL WELLNESS VISIT, SUBSEQUENT: ICD-10-CM

## 2025-01-31 DIAGNOSIS — I34.0 MITRAL VALVE INSUFFICIENCY, UNSPECIFIED ETIOLOGY: ICD-10-CM

## 2025-01-31 DIAGNOSIS — C61 PROSTATE CANCER: ICD-10-CM

## 2025-01-31 DIAGNOSIS — Z12.5 ENCOUNTER FOR SCREENING FOR MALIGNANT NEOPLASM OF PROSTATE: ICD-10-CM

## 2025-01-31 DIAGNOSIS — I10 PRIMARY HYPERTENSION: ICD-10-CM

## 2025-01-31 DIAGNOSIS — E78.00 PURE HYPERCHOLESTEROLEMIA: ICD-10-CM

## 2025-01-31 DIAGNOSIS — J45.20 MILD INTERMITTENT ASTHMA, UNSPECIFIED WHETHER COMPLICATED: ICD-10-CM

## 2025-01-31 DIAGNOSIS — E03.8 SUBCLINICAL HYPOTHYROIDISM: ICD-10-CM

## 2025-01-31 DIAGNOSIS — R97.20 ELEVATED PSA: ICD-10-CM

## 2025-01-31 DIAGNOSIS — R73.01 IFG (IMPAIRED FASTING GLUCOSE): ICD-10-CM

## 2025-01-31 LAB
ALBUMIN SERPL-MCNC: 4.1 G/DL (ref 3.5–5.2)
ALBUMIN UR-MCNC: 1.4 MG/DL
ALBUMIN/GLOB SERPL: 1.5 G/DL
ALP SERPL-CCNC: 66 U/L (ref 39–117)
ALT SERPL W P-5'-P-CCNC: 38 U/L (ref 1–41)
ANION GAP SERPL CALCULATED.3IONS-SCNC: 9.4 MMOL/L (ref 5–15)
AST SERPL-CCNC: 32 U/L (ref 1–40)
BASOPHILS # BLD AUTO: 0.09 10*3/MM3 (ref 0–0.2)
BASOPHILS NFR BLD AUTO: 1.6 % (ref 0–1.5)
BILIRUB SERPL-MCNC: 0.4 MG/DL (ref 0–1.2)
BUN SERPL-MCNC: 16 MG/DL (ref 8–23)
BUN/CREAT SERPL: 10.5 (ref 7–25)
CALCIUM SPEC-SCNC: 9.1 MG/DL (ref 8.6–10.5)
CHLORIDE SERPL-SCNC: 104 MMOL/L (ref 98–107)
CHOLEST SERPL-MCNC: 162 MG/DL (ref 0–200)
CO2 SERPL-SCNC: 26.6 MMOL/L (ref 22–29)
CREAT SERPL-MCNC: 1.53 MG/DL (ref 0.76–1.27)
DEPRECATED RDW RBC AUTO: 39.1 FL (ref 37–54)
EGFRCR SERPLBLD CKD-EPI 2021: 47.1 ML/MIN/1.73
EOSINOPHIL # BLD AUTO: 0.34 10*3/MM3 (ref 0–0.4)
EOSINOPHIL NFR BLD AUTO: 6.1 % (ref 0.3–6.2)
ERYTHROCYTE [DISTWIDTH] IN BLOOD BY AUTOMATED COUNT: 11.8 % (ref 12.3–15.4)
GLOBULIN UR ELPH-MCNC: 2.8 GM/DL
GLUCOSE SERPL-MCNC: 114 MG/DL (ref 65–99)
HBA1C MFR BLD: 5.8 % (ref 4.8–5.6)
HCT VFR BLD AUTO: 46 % (ref 37.5–51)
HDLC SERPL-MCNC: 38 MG/DL (ref 40–60)
HGB BLD-MCNC: 15.8 G/DL (ref 13–17.7)
IMM GRANULOCYTES # BLD AUTO: 0.02 10*3/MM3 (ref 0–0.05)
IMM GRANULOCYTES NFR BLD AUTO: 0.4 % (ref 0–0.5)
LDLC SERPL CALC-MCNC: 96 MG/DL (ref 0–100)
LDLC/HDLC SERPL: 2.42 {RATIO}
LYMPHOCYTES # BLD AUTO: 1.72 10*3/MM3 (ref 0.7–3.1)
LYMPHOCYTES NFR BLD AUTO: 31.1 % (ref 19.6–45.3)
MCH RBC QN AUTO: 31 PG (ref 26.6–33)
MCHC RBC AUTO-ENTMCNC: 34.3 G/DL (ref 31.5–35.7)
MCV RBC AUTO: 90.4 FL (ref 79–97)
MONOCYTES # BLD AUTO: 0.49 10*3/MM3 (ref 0.1–0.9)
MONOCYTES NFR BLD AUTO: 8.9 % (ref 5–12)
NEUTROPHILS NFR BLD AUTO: 2.87 10*3/MM3 (ref 1.7–7)
NEUTROPHILS NFR BLD AUTO: 51.9 % (ref 42.7–76)
NRBC BLD AUTO-RTO: 0 /100 WBC (ref 0–0.2)
PLATELET # BLD AUTO: 186 10*3/MM3 (ref 140–450)
PMV BLD AUTO: 10.3 FL (ref 6–12)
POTASSIUM SERPL-SCNC: 4.3 MMOL/L (ref 3.5–5.2)
PROT SERPL-MCNC: 6.9 G/DL (ref 6–8.5)
PSA SERPL-MCNC: 9.1 NG/ML (ref 0–4)
RBC # BLD AUTO: 5.09 10*6/MM3 (ref 4.14–5.8)
SODIUM SERPL-SCNC: 140 MMOL/L (ref 136–145)
T4 FREE SERPL-MCNC: 1.24 NG/DL (ref 0.92–1.68)
TRIGL SERPL-MCNC: 160 MG/DL (ref 0–150)
TSH SERPL DL<=0.05 MIU/L-ACNC: 5.01 UIU/ML (ref 0.27–4.2)
VLDLC SERPL-MCNC: 28 MG/DL (ref 5–40)
WBC NRBC COR # BLD AUTO: 5.53 10*3/MM3 (ref 3.4–10.8)

## 2025-01-31 PROCEDURE — 80053 COMPREHEN METABOLIC PANEL: CPT

## 2025-01-31 PROCEDURE — 84439 ASSAY OF FREE THYROXINE: CPT

## 2025-01-31 PROCEDURE — 36415 COLL VENOUS BLD VENIPUNCTURE: CPT

## 2025-01-31 PROCEDURE — 85025 COMPLETE CBC W/AUTO DIFF WBC: CPT

## 2025-01-31 PROCEDURE — 84443 ASSAY THYROID STIM HORMONE: CPT

## 2025-01-31 PROCEDURE — G0103 PSA SCREENING: HCPCS

## 2025-01-31 PROCEDURE — 83036 HEMOGLOBIN GLYCOSYLATED A1C: CPT

## 2025-01-31 PROCEDURE — 82043 UR ALBUMIN QUANTITATIVE: CPT

## 2025-01-31 PROCEDURE — 80061 LIPID PANEL: CPT

## 2025-02-04 ENCOUNTER — OFFICE VISIT (OUTPATIENT)
Dept: CARDIAC REHAB | Facility: HOSPITAL | Age: 75
End: 2025-02-04

## 2025-02-04 VITALS
BODY MASS INDEX: 28.4 KG/M2 | DIASTOLIC BLOOD PRESSURE: 80 MMHG | WEIGHT: 186.73 LBS | SYSTOLIC BLOOD PRESSURE: 132 MMHG | OXYGEN SATURATION: 97 % | HEART RATE: 59 BPM

## 2025-02-04 DIAGNOSIS — T82.855D STENOSIS OF CORONARY ARTERY STENT, SUBSEQUENT ENCOUNTER: Primary | ICD-10-CM

## 2025-02-06 ENCOUNTER — OFFICE VISIT (OUTPATIENT)
Dept: CARDIAC REHAB | Facility: HOSPITAL | Age: 75
End: 2025-02-06

## 2025-02-06 VITALS — SYSTOLIC BLOOD PRESSURE: 110 MMHG | OXYGEN SATURATION: 97 % | HEART RATE: 64 BPM | DIASTOLIC BLOOD PRESSURE: 80 MMHG

## 2025-02-06 DIAGNOSIS — T82.855D STENOSIS OF CORONARY ARTERY STENT, SUBSEQUENT ENCOUNTER: Primary | ICD-10-CM

## 2025-02-11 ENCOUNTER — OFFICE VISIT (OUTPATIENT)
Dept: CARDIAC REHAB | Facility: HOSPITAL | Age: 75
End: 2025-02-11

## 2025-02-11 VITALS
SYSTOLIC BLOOD PRESSURE: 130 MMHG | BODY MASS INDEX: 28.67 KG/M2 | HEART RATE: 65 BPM | WEIGHT: 188.49 LBS | DIASTOLIC BLOOD PRESSURE: 82 MMHG | OXYGEN SATURATION: 98 %

## 2025-02-11 DIAGNOSIS — T82.855D STENOSIS OF CORONARY ARTERY STENT, SUBSEQUENT ENCOUNTER: Primary | ICD-10-CM

## 2025-02-12 ENCOUNTER — OFFICE VISIT (OUTPATIENT)
Dept: INTERNAL MEDICINE | Age: 75
End: 2025-02-12
Payer: MEDICARE

## 2025-02-12 VITALS
TEMPERATURE: 98.4 F | WEIGHT: 190 LBS | SYSTOLIC BLOOD PRESSURE: 143 MMHG | DIASTOLIC BLOOD PRESSURE: 72 MMHG | HEIGHT: 68 IN | BODY MASS INDEX: 28.79 KG/M2 | OXYGEN SATURATION: 98 % | HEART RATE: 70 BPM

## 2025-02-12 DIAGNOSIS — I10 PRIMARY HYPERTENSION: ICD-10-CM

## 2025-02-12 DIAGNOSIS — E55.9 VITAMIN D DEFICIENCY: ICD-10-CM

## 2025-02-12 DIAGNOSIS — E06.3 HYPOTHYROIDISM DUE TO HASHIMOTO THYROIDITIS: ICD-10-CM

## 2025-02-12 DIAGNOSIS — R97.20 ELEVATED PSA: ICD-10-CM

## 2025-02-12 DIAGNOSIS — E78.00 PURE HYPERCHOLESTEROLEMIA: ICD-10-CM

## 2025-02-12 DIAGNOSIS — N18.31 STAGE 3A CHRONIC KIDNEY DISEASE: ICD-10-CM

## 2025-02-12 DIAGNOSIS — Z95.5 S/P CORONARY ARTERY STENT PLACEMENT: ICD-10-CM

## 2025-02-12 DIAGNOSIS — R73.01 IFG (IMPAIRED FASTING GLUCOSE): ICD-10-CM

## 2025-02-12 DIAGNOSIS — R74.8 ELEVATED LIVER ENZYMES: ICD-10-CM

## 2025-02-12 DIAGNOSIS — I25.10 CORONARY ARTERY DISEASE INVOLVING NATIVE CORONARY ARTERY OF NATIVE HEART WITHOUT ANGINA PECTORIS: Primary | ICD-10-CM

## 2025-02-12 PROCEDURE — G2211 COMPLEX E/M VISIT ADD ON: HCPCS | Performed by: INTERNAL MEDICINE

## 2025-02-12 PROCEDURE — 1126F AMNT PAIN NOTED NONE PRSNT: CPT | Performed by: INTERNAL MEDICINE

## 2025-02-12 PROCEDURE — 99214 OFFICE O/P EST MOD 30 MIN: CPT | Performed by: INTERNAL MEDICINE

## 2025-02-12 PROCEDURE — 3078F DIAST BP <80 MM HG: CPT | Performed by: INTERNAL MEDICINE

## 2025-02-12 PROCEDURE — 3077F SYST BP >= 140 MM HG: CPT | Performed by: INTERNAL MEDICINE

## 2025-02-12 NOTE — PROGRESS NOTES
"Chief Complaint   Patient presents with    Hyperlipidemia     Routine follow up, Lab follow up . Pt states no concerns.    Follow-up with coronary artery disease, says he is doing well no chest pains he works out at cardiac rehab at least 2 to 3 days mornings per week, here to follow-up with lab work, he sees Dr. Rubio having another MRI soon    Objective   Vital Signs  Vitals:    02/12/25 1120   BP: 143/72   BP Location: Right arm   Patient Position: Sitting   Pulse: 70   Temp: 98.4 °F (36.9 °C)   SpO2: 98%   Weight: 86.2 kg (190 lb)   Height: 172.7 cm (67.99\")      Body mass index is 28.9 kg/m².  Review of Systems   Constitutional: Negative.    HENT: Negative.     Eyes: Negative.    Respiratory: Negative.     Cardiovascular: Negative.    Gastrointestinal: Negative.    Endocrine: Negative.    Genitourinary: Negative.    Musculoskeletal: Negative.    Allergic/Immunologic: Negative.    Neurological: Negative.    Hematological: Negative.    Psychiatric/Behavioral: Negative.        Physical Exam  Constitutional:       General: He is not in acute distress.     Appearance: Normal appearance.   HENT:      Head: Normocephalic.      Mouth/Throat:      Mouth: Mucous membranes are moist.   Eyes:      Conjunctiva/sclera: Conjunctivae normal.      Pupils: Pupils are equal, round, and reactive to light.   Cardiovascular:      Rate and Rhythm: Normal rate and regular rhythm.      Pulses: Normal pulses.      Heart sounds: Normal heart sounds.   Pulmonary:      Effort: Pulmonary effort is normal.      Breath sounds: Normal breath sounds.   Abdominal:      General: Abdomen is flat. Bowel sounds are normal.      Palpations: Abdomen is soft.   Musculoskeletal:         General: No swelling. Normal range of motion.      Cervical back: Neck supple.   Skin:     General: Skin is warm and dry.      Coloration: Skin is not jaundiced.   Neurological:      General: No focal deficit present.      Mental Status: He is alert and oriented to " person, place, and time. Mental status is at baseline.   Psychiatric:         Mood and Affect: Mood normal.         Behavior: Behavior normal.         Thought Content: Thought content normal.         Judgment: Judgment normal.        Result Review :   Lab Results   Component Value Date    BNP <15 03/17/2019     CMP          6/17/2024    08:50 8/5/2024    07:39 1/31/2025    06:40   CMP   Glucose 110  99  114    BUN 18  19  16    Creatinine 1.31  1.44  1.53    EGFR 56.8  50.7  47.1    Sodium 140  142  140    Potassium 4.5  4.3  4.3    Chloride 106  106  104    Calcium 9.0  9.2  9.1    Total Protein  6.7  6.9    Albumin  4.1  4.1    Globulin  2.6  2.8    Total Bilirubin  0.5  0.4    Alkaline Phosphatase  64  66    AST (SGOT)  25  32    ALT (SGPT)  23  38    Albumin/Globulin Ratio  1.6  1.5    BUN/Creatinine Ratio 13.7  13.2  10.5    Anion Gap 8.7  10.5  9.4      CBC w/diff          8/5/2024    07:39 1/31/2025    06:40   CBC w/Diff   WBC 5.95  5.53    RBC 5.01  5.09    Hemoglobin 15.0  15.8    Hematocrit 46.6  46.0    MCV 93.0  90.4    MCH 29.9  31.0    MCHC 32.2  34.3    RDW 12.7  11.8    Platelets 174  186    Neutrophil Rel % 54.2  51.9    Immature Granulocyte Rel % 0.3  0.4    Lymphocyte Rel % 32.9  31.1    Monocyte Rel % 9.1  8.9    Eosinophil Rel % 2.5  6.1    Basophil Rel % 1.0  1.6       Lipid Panel          8/5/2024    07:39 1/31/2025    06:40   Lipid Panel   Total Cholesterol 177  162    Triglycerides 185  160    HDL Cholesterol 40  38    VLDL Cholesterol 32  28    LDL Cholesterol  105  96    LDL/HDL Ratio 2.50  2.42       Lab Results   Component Value Date    TSH 5.010 (H) 01/31/2025    TSH 4.550 (H) 02/12/2024    TSH 3.520 10/09/2023      Lab Results   Component Value Date    FREET4 1.24 01/31/2025    FREET4 1.04 02/12/2024    FREET4 1.08 10/09/2023      A1C Last 3 Results          8/5/2024    07:39 1/31/2025    06:40   HGBA1C Last 3 Results   Hemoglobin A1C 6.10  5.80       PSA          6/6/2024    13:42  12/3/2024    06:50 1/31/2025    06:40   PSA   PSA 7.220  8.090  9.100                     Visit Diagnoses:    ICD-10-CM ICD-9-CM   1. Coronary artery disease involving native coronary artery of native heart without angina pectoris  I25.10 414.01   2. S/P coronary artery stent placement  Z95.5 V45.82   3. Primary hypertension  I10 401.9   4. Pure hypercholesterolemia  E78.00 272.0   5. VIT D DEF  E55.9 268.9   6. Elevated PSA  R97.20 790.93   7. Hypothyroidism due to Hashimoto thyroiditis  E06.3 245.2   8. IFG (impaired fasting glucose)  R73.01 790.21   9. Stage 3a chronic kidney disease  N18.31 585.3   10. Elevated liver enzymes  R74.8 790.5       Assessment and Plan   Diagnoses and all orders for this visit:    1. Coronary artery disease involving native coronary artery of native heart without angina pectoris (Primary)  -     Comprehensive Metabolic Panel; Future  -     CBC & Differential; Future  -     Hemoglobin A1c; Future  -     Lipid Panel; Future  -     TSH+Free T4; Future  -     PSA DIAGNOSTIC; Future    2. S/P coronary artery stent placement  -     Comprehensive Metabolic Panel; Future  -     CBC & Differential; Future  -     Hemoglobin A1c; Future  -     Lipid Panel; Future  -     TSH+Free T4; Future  -     PSA DIAGNOSTIC; Future    3. Primary hypertension  -     Comprehensive Metabolic Panel; Future  -     CBC & Differential; Future  -     Hemoglobin A1c; Future  -     Lipid Panel; Future  -     TSH+Free T4; Future  -     PSA DIAGNOSTIC; Future    4. Pure hypercholesterolemia  -     Comprehensive Metabolic Panel; Future  -     CBC & Differential; Future  -     Hemoglobin A1c; Future  -     Lipid Panel; Future  -     TSH+Free T4; Future  -     PSA DIAGNOSTIC; Future    5. VIT D DEF  -     Comprehensive Metabolic Panel; Future  -     CBC & Differential; Future  -     Hemoglobin A1c; Future  -     Lipid Panel; Future  -     TSH+Free T4; Future  -     PSA DIAGNOSTIC; Future    6. Elevated PSA  -      Comprehensive Metabolic Panel; Future  -     CBC & Differential; Future  -     Hemoglobin A1c; Future  -     Lipid Panel; Future  -     TSH+Free T4; Future  -     PSA DIAGNOSTIC; Future    7. Hypothyroidism due to Hashimoto thyroiditis  -     Comprehensive Metabolic Panel; Future  -     CBC & Differential; Future  -     Hemoglobin A1c; Future  -     Lipid Panel; Future  -     TSH+Free T4; Future  -     PSA DIAGNOSTIC; Future    8. IFG (impaired fasting glucose)  -     Comprehensive Metabolic Panel; Future  -     CBC & Differential; Future  -     Hemoglobin A1c; Future  -     Lipid Panel; Future  -     TSH+Free T4; Future  -     PSA DIAGNOSTIC; Future    9. Stage 3a chronic kidney disease  -     US Renal Bilateral; Future  -     Comprehensive Metabolic Panel; Future  -     CBC & Differential; Future  -     Hemoglobin A1c; Future  -     Lipid Panel; Future  -     TSH+Free T4; Future  -     PSA DIAGNOSTIC; Future    10. Elevated liver enzymes  -     Comprehensive Metabolic Panel; Future  -     CBC & Differential; Future  -     Hemoglobin A1c; Future  -     Lipid Panel; Future  -     TSH+Free T4; Future  -     PSA DIAGNOSTIC; Future        Annual wellness visit completed questionnaires, August 12, 2024    CKD stage IIIa, creatinine up to 1.5 January 2025, will do ultrasound of the kidney, stop Prilosec, follow-up May need nephrology consultation if numbers were to worsen, push fluids, stay off anti-inflammatories if possible    Coronary artery disease previous stent placement 2005 patient still goes to cardiac rehab,------ does follow-up with [facility] cardiology once a year,, -------------continues Crestor 40 mg daily, metoprolol extended release 25 mg half a tablet daily,  aspirin 81 mg daily, -----------patient exercises twice a week at cardiac rehab     Elevated PSA --- 9.1 January 31, 2025 follows up with Dr. Rubio ====MRI November 2022,--- MRI shows a peer RADS 4 lesion within the right peripheral zone no change  or stable, benign prostatic hyperplasia ,  otherwise, has had several biopsies in the past,--mri prostate may 17, 2023 , stable ,     Impaired fasting glucose hemoglobin A1c 6.1 August 5, 2024=== urine microalbumin was normal January 31, 2025    Allergic rhinitis, allergies, uses over-the-counter antihistamines    Hypothyroidism, continues  levothyroxine 0.025 mg daily will monitor    Colonoscopy March 30, 2023 with Dr. Kailash Borges, 3 small polyps 1 in the ascending and 2 in the transverse all small, 2 of those polyps were tubular adenomas, follow-up again in 3 to 5 years    Elevated liver enzymes--hepatitis panels all -  neg October 9, 2023,               Follow Up   Return in about 6 months (around 8/12/2025).  Patient was given instructions and counseling regarding his condition or for health maintenance advice. Please see specific information pulled into the AVS if appropriate.

## 2025-02-13 ENCOUNTER — OFFICE VISIT (OUTPATIENT)
Dept: CARDIAC REHAB | Facility: HOSPITAL | Age: 75
End: 2025-02-13

## 2025-02-13 VITALS
BODY MASS INDEX: 28.7 KG/M2 | WEIGHT: 188.71 LBS | OXYGEN SATURATION: 94 % | DIASTOLIC BLOOD PRESSURE: 72 MMHG | HEART RATE: 84 BPM | SYSTOLIC BLOOD PRESSURE: 124 MMHG

## 2025-02-13 DIAGNOSIS — T82.855D STENOSIS OF CORONARY ARTERY STENT, SUBSEQUENT ENCOUNTER: Primary | ICD-10-CM

## 2025-02-18 ENCOUNTER — OFFICE VISIT (OUTPATIENT)
Dept: CARDIAC REHAB | Facility: HOSPITAL | Age: 75
End: 2025-02-18

## 2025-02-18 VITALS
HEART RATE: 69 BPM | DIASTOLIC BLOOD PRESSURE: 70 MMHG | WEIGHT: 190.26 LBS | SYSTOLIC BLOOD PRESSURE: 110 MMHG | BODY MASS INDEX: 28.94 KG/M2 | OXYGEN SATURATION: 97 %

## 2025-02-18 DIAGNOSIS — T82.855D STENOSIS OF CORONARY ARTERY STENT, SUBSEQUENT ENCOUNTER: Primary | ICD-10-CM

## 2025-02-20 ENCOUNTER — OFFICE VISIT (OUTPATIENT)
Dept: CARDIAC REHAB | Facility: HOSPITAL | Age: 75
End: 2025-02-20

## 2025-02-20 VITALS
SYSTOLIC BLOOD PRESSURE: 124 MMHG | HEART RATE: 59 BPM | WEIGHT: 189.38 LBS | BODY MASS INDEX: 28.8 KG/M2 | DIASTOLIC BLOOD PRESSURE: 80 MMHG | OXYGEN SATURATION: 99 %

## 2025-02-20 DIAGNOSIS — E55.9 VITAMIN D DEFICIENCY: ICD-10-CM

## 2025-02-20 DIAGNOSIS — T82.855D STENOSIS OF CORONARY ARTERY STENT, SUBSEQUENT ENCOUNTER: Primary | ICD-10-CM

## 2025-02-20 RX ORDER — ERGOCALCIFEROL 1.25 MG/1
CAPSULE, LIQUID FILLED ORAL
Qty: 4 CAPSULE | Refills: 0 | Status: SHIPPED | OUTPATIENT
Start: 2025-02-20

## 2025-02-25 ENCOUNTER — OFFICE VISIT (OUTPATIENT)
Dept: CARDIAC REHAB | Facility: HOSPITAL | Age: 75
End: 2025-02-25

## 2025-02-25 VITALS
BODY MASS INDEX: 28.9 KG/M2 | DIASTOLIC BLOOD PRESSURE: 70 MMHG | HEART RATE: 54 BPM | OXYGEN SATURATION: 97 % | SYSTOLIC BLOOD PRESSURE: 120 MMHG | WEIGHT: 190.04 LBS

## 2025-02-25 DIAGNOSIS — T82.855D STENOSIS OF CORONARY ARTERY STENT, SUBSEQUENT ENCOUNTER: Primary | ICD-10-CM

## 2025-02-27 ENCOUNTER — OFFICE VISIT (OUTPATIENT)
Dept: CARDIAC REHAB | Facility: HOSPITAL | Age: 75
End: 2025-02-27

## 2025-02-27 VITALS
SYSTOLIC BLOOD PRESSURE: 118 MMHG | HEART RATE: 60 BPM | OXYGEN SATURATION: 95 % | BODY MASS INDEX: 28.77 KG/M2 | DIASTOLIC BLOOD PRESSURE: 68 MMHG | WEIGHT: 189.15 LBS

## 2025-02-27 DIAGNOSIS — T82.855D STENOSIS OF CORONARY ARTERY STENT, SUBSEQUENT ENCOUNTER: Primary | ICD-10-CM

## 2025-03-04 ENCOUNTER — OFFICE VISIT (OUTPATIENT)
Dept: CARDIAC REHAB | Facility: HOSPITAL | Age: 75
End: 2025-03-04

## 2025-03-04 VITALS
WEIGHT: 189.6 LBS | HEART RATE: 65 BPM | SYSTOLIC BLOOD PRESSURE: 132 MMHG | BODY MASS INDEX: 28.83 KG/M2 | OXYGEN SATURATION: 97 % | DIASTOLIC BLOOD PRESSURE: 80 MMHG

## 2025-03-04 DIAGNOSIS — T82.855D STENOSIS OF CORONARY ARTERY STENT, SUBSEQUENT ENCOUNTER: Primary | ICD-10-CM

## 2025-03-06 ENCOUNTER — OFFICE VISIT (OUTPATIENT)
Dept: CARDIAC REHAB | Facility: HOSPITAL | Age: 75
End: 2025-03-06

## 2025-03-06 VITALS
DIASTOLIC BLOOD PRESSURE: 76 MMHG | OXYGEN SATURATION: 97 % | BODY MASS INDEX: 28.73 KG/M2 | SYSTOLIC BLOOD PRESSURE: 122 MMHG | HEART RATE: 55 BPM | WEIGHT: 188.93 LBS

## 2025-03-06 DIAGNOSIS — T82.855D STENOSIS OF CORONARY ARTERY STENT, SUBSEQUENT ENCOUNTER: Primary | ICD-10-CM

## 2025-03-07 ENCOUNTER — HOSPITAL ENCOUNTER (OUTPATIENT)
Dept: ULTRASOUND IMAGING | Facility: HOSPITAL | Age: 75
Discharge: HOME | End: 2025-03-07
Payer: MEDICARE

## 2025-03-07 DIAGNOSIS — N18.31 STAGE 3A CHRONIC KIDNEY DISEASE: ICD-10-CM

## 2025-03-07 PROCEDURE — 76775 US EXAM ABDO BACK WALL LIM: CPT

## 2025-03-11 ENCOUNTER — OFFICE VISIT (OUTPATIENT)
Dept: CARDIAC REHAB | Facility: HOSPITAL | Age: 75
End: 2025-03-11

## 2025-03-11 VITALS
BODY MASS INDEX: 28.5 KG/M2 | DIASTOLIC BLOOD PRESSURE: 64 MMHG | WEIGHT: 187.39 LBS | SYSTOLIC BLOOD PRESSURE: 124 MMHG | HEART RATE: 71 BPM | OXYGEN SATURATION: 98 %

## 2025-03-11 DIAGNOSIS — T82.855D STENOSIS OF CORONARY ARTERY STENT, SUBSEQUENT ENCOUNTER: Primary | ICD-10-CM

## 2025-03-11 NOTE — PROGRESS NOTES
Pt tolerated exercises see scanned report.   The patient's insurance [employer] requires a new Rx for an accu-chek one touch meter...freestyle (patient currently uses) is no longer covered for their strips/lancets. Please send new Rx for meter.  Patient is currently using Freestyle Lite Blood Glucose Strips.

## 2025-03-13 ENCOUNTER — OFFICE VISIT (OUTPATIENT)
Dept: CARDIAC REHAB | Facility: HOSPITAL | Age: 75
End: 2025-03-13

## 2025-03-13 VITALS — DIASTOLIC BLOOD PRESSURE: 68 MMHG | SYSTOLIC BLOOD PRESSURE: 122 MMHG | HEART RATE: 54 BPM | OXYGEN SATURATION: 96 %

## 2025-03-13 DIAGNOSIS — T82.855D STENOSIS OF CORONARY ARTERY STENT, SUBSEQUENT ENCOUNTER: Primary | ICD-10-CM

## 2025-03-18 ENCOUNTER — OFFICE VISIT (OUTPATIENT)
Dept: CARDIAC REHAB | Facility: HOSPITAL | Age: 75
End: 2025-03-18

## 2025-03-18 VITALS
WEIGHT: 189.38 LBS | BODY MASS INDEX: 28.8 KG/M2 | SYSTOLIC BLOOD PRESSURE: 126 MMHG | HEART RATE: 70 BPM | OXYGEN SATURATION: 98 % | DIASTOLIC BLOOD PRESSURE: 76 MMHG

## 2025-03-18 DIAGNOSIS — T82.855D STENOSIS OF CORONARY ARTERY STENT, SUBSEQUENT ENCOUNTER: Primary | ICD-10-CM

## 2025-03-20 ENCOUNTER — OFFICE VISIT (OUTPATIENT)
Dept: CARDIAC REHAB | Facility: HOSPITAL | Age: 75
End: 2025-03-20

## 2025-03-20 VITALS — DIASTOLIC BLOOD PRESSURE: 84 MMHG | HEART RATE: 60 BPM | OXYGEN SATURATION: 96 % | SYSTOLIC BLOOD PRESSURE: 110 MMHG

## 2025-03-20 DIAGNOSIS — T82.855D STENOSIS OF CORONARY ARTERY STENT, SUBSEQUENT ENCOUNTER: Primary | ICD-10-CM

## 2025-03-24 DIAGNOSIS — E55.9 VITAMIN D DEFICIENCY: ICD-10-CM

## 2025-03-24 RX ORDER — METOPROLOL SUCCINATE 25 MG/1
12.5 TABLET, EXTENDED RELEASE ORAL DAILY
Qty: 45 TABLET | Refills: 1 | Status: SHIPPED | OUTPATIENT
Start: 2025-03-24

## 2025-03-24 RX ORDER — ERGOCALCIFEROL 1.25 MG/1
50000 CAPSULE, LIQUID FILLED ORAL WEEKLY
Qty: 4 CAPSULE | Refills: 0 | Status: SHIPPED | OUTPATIENT
Start: 2025-03-24

## 2025-03-25 ENCOUNTER — OFFICE VISIT (OUTPATIENT)
Dept: CARDIAC REHAB | Facility: HOSPITAL | Age: 75
End: 2025-03-25

## 2025-03-25 VITALS
HEART RATE: 54 BPM | BODY MASS INDEX: 29 KG/M2 | DIASTOLIC BLOOD PRESSURE: 76 MMHG | WEIGHT: 190.7 LBS | SYSTOLIC BLOOD PRESSURE: 116 MMHG | OXYGEN SATURATION: 99 %

## 2025-03-25 DIAGNOSIS — T82.855D STENOSIS OF CORONARY ARTERY STENT, SUBSEQUENT ENCOUNTER: Primary | ICD-10-CM

## 2025-03-27 ENCOUNTER — OFFICE VISIT (OUTPATIENT)
Dept: CARDIAC REHAB | Facility: HOSPITAL | Age: 75
End: 2025-03-27

## 2025-03-27 VITALS
HEART RATE: 60 BPM | DIASTOLIC BLOOD PRESSURE: 78 MMHG | BODY MASS INDEX: 28.6 KG/M2 | SYSTOLIC BLOOD PRESSURE: 130 MMHG | WEIGHT: 188.05 LBS | OXYGEN SATURATION: 96 %

## 2025-03-27 DIAGNOSIS — T82.855D STENOSIS OF CORONARY ARTERY STENT, SUBSEQUENT ENCOUNTER: Primary | ICD-10-CM

## 2025-04-01 ENCOUNTER — OFFICE VISIT (OUTPATIENT)
Dept: CARDIAC REHAB | Facility: HOSPITAL | Age: 75
End: 2025-04-01

## 2025-04-01 VITALS
WEIGHT: 190.7 LBS | BODY MASS INDEX: 29 KG/M2 | HEART RATE: 58 BPM | DIASTOLIC BLOOD PRESSURE: 70 MMHG | OXYGEN SATURATION: 100 % | SYSTOLIC BLOOD PRESSURE: 128 MMHG

## 2025-04-01 DIAGNOSIS — T82.855D STENOSIS OF CORONARY ARTERY STENT, SUBSEQUENT ENCOUNTER: Primary | ICD-10-CM

## 2025-04-03 ENCOUNTER — OFFICE VISIT (OUTPATIENT)
Dept: CARDIAC REHAB | Facility: HOSPITAL | Age: 75
End: 2025-04-03

## 2025-04-03 VITALS
SYSTOLIC BLOOD PRESSURE: 110 MMHG | BODY MASS INDEX: 28.63 KG/M2 | DIASTOLIC BLOOD PRESSURE: 60 MMHG | HEART RATE: 67 BPM | WEIGHT: 188.27 LBS | OXYGEN SATURATION: 97 %

## 2025-04-03 DIAGNOSIS — T82.855D STENOSIS OF CORONARY ARTERY STENT, SUBSEQUENT ENCOUNTER: Primary | ICD-10-CM

## 2025-04-07 RX ORDER — OMEPRAZOLE 40 MG/1
40 CAPSULE, DELAYED RELEASE ORAL DAILY
Qty: 90 CAPSULE | Refills: 0 | OUTPATIENT
Start: 2025-04-07

## 2025-04-08 ENCOUNTER — OFFICE VISIT (OUTPATIENT)
Dept: CARDIAC REHAB | Facility: HOSPITAL | Age: 75
End: 2025-04-08

## 2025-04-08 VITALS
OXYGEN SATURATION: 98 % | BODY MASS INDEX: 28.94 KG/M2 | WEIGHT: 190.26 LBS | SYSTOLIC BLOOD PRESSURE: 138 MMHG | DIASTOLIC BLOOD PRESSURE: 82 MMHG | HEART RATE: 72 BPM

## 2025-04-08 DIAGNOSIS — T82.855D STENOSIS OF CORONARY ARTERY STENT, SUBSEQUENT ENCOUNTER: Primary | ICD-10-CM

## 2025-04-10 ENCOUNTER — OFFICE VISIT (OUTPATIENT)
Dept: CARDIAC REHAB | Facility: HOSPITAL | Age: 75
End: 2025-04-10

## 2025-04-10 VITALS — DIASTOLIC BLOOD PRESSURE: 66 MMHG | SYSTOLIC BLOOD PRESSURE: 118 MMHG | OXYGEN SATURATION: 99 % | HEART RATE: 55 BPM

## 2025-04-10 DIAGNOSIS — T82.855D STENOSIS OF CORONARY ARTERY STENT, SUBSEQUENT ENCOUNTER: Primary | ICD-10-CM

## 2025-04-11 ENCOUNTER — TELEPHONE (OUTPATIENT)
Dept: INTERNAL MEDICINE | Age: 75
End: 2025-04-11
Payer: MEDICARE

## 2025-04-11 DIAGNOSIS — K21.00 GASTROESOPHAGEAL REFLUX DISEASE WITH ESOPHAGITIS, UNSPECIFIED WHETHER HEMORRHAGE: Primary | ICD-10-CM

## 2025-04-11 RX ORDER — OMEPRAZOLE 40 MG/1
40 CAPSULE, DELAYED RELEASE ORAL DAILY
Qty: 90 CAPSULE | Refills: 3 | Status: SHIPPED | OUTPATIENT
Start: 2025-04-11

## 2025-04-15 ENCOUNTER — OFFICE VISIT (OUTPATIENT)
Dept: CARDIAC REHAB | Facility: HOSPITAL | Age: 75
End: 2025-04-15

## 2025-04-15 VITALS
OXYGEN SATURATION: 97 % | DIASTOLIC BLOOD PRESSURE: 80 MMHG | WEIGHT: 189.38 LBS | SYSTOLIC BLOOD PRESSURE: 124 MMHG | HEART RATE: 63 BPM | BODY MASS INDEX: 28.8 KG/M2

## 2025-04-15 DIAGNOSIS — T82.855D STENOSIS OF CORONARY ARTERY STENT, SUBSEQUENT ENCOUNTER: Primary | ICD-10-CM

## 2025-04-17 ENCOUNTER — OFFICE VISIT (OUTPATIENT)
Dept: CARDIAC REHAB | Facility: HOSPITAL | Age: 75
End: 2025-04-17

## 2025-04-17 VITALS
DIASTOLIC BLOOD PRESSURE: 66 MMHG | SYSTOLIC BLOOD PRESSURE: 116 MMHG | WEIGHT: 187.61 LBS | OXYGEN SATURATION: 99 % | BODY MASS INDEX: 28.53 KG/M2 | HEART RATE: 69 BPM

## 2025-04-17 DIAGNOSIS — T82.855D STENOSIS OF CORONARY ARTERY STENT, SUBSEQUENT ENCOUNTER: Primary | ICD-10-CM

## 2025-04-22 ENCOUNTER — OFFICE VISIT (OUTPATIENT)
Dept: CARDIAC REHAB | Facility: HOSPITAL | Age: 75
End: 2025-04-22

## 2025-04-22 VITALS
HEART RATE: 55 BPM | OXYGEN SATURATION: 96 % | DIASTOLIC BLOOD PRESSURE: 80 MMHG | SYSTOLIC BLOOD PRESSURE: 104 MMHG | BODY MASS INDEX: 28.63 KG/M2 | WEIGHT: 188.27 LBS

## 2025-04-22 DIAGNOSIS — T82.855D STENOSIS OF CORONARY ARTERY STENT, SUBSEQUENT ENCOUNTER: Primary | ICD-10-CM

## 2025-04-24 ENCOUNTER — OFFICE VISIT (OUTPATIENT)
Dept: CARDIAC REHAB | Facility: HOSPITAL | Age: 75
End: 2025-04-24

## 2025-04-24 VITALS
SYSTOLIC BLOOD PRESSURE: 116 MMHG | OXYGEN SATURATION: 96 % | HEART RATE: 57 BPM | BODY MASS INDEX: 28.67 KG/M2 | DIASTOLIC BLOOD PRESSURE: 68 MMHG | WEIGHT: 188.49 LBS

## 2025-04-24 DIAGNOSIS — T82.855D STENOSIS OF CORONARY ARTERY STENT, SUBSEQUENT ENCOUNTER: Primary | ICD-10-CM

## 2025-04-29 DIAGNOSIS — E55.9 VITAMIN D DEFICIENCY: ICD-10-CM

## 2025-04-29 RX ORDER — ERGOCALCIFEROL 1.25 MG/1
50000 CAPSULE, LIQUID FILLED ORAL WEEKLY
Qty: 4 CAPSULE | Refills: 1 | Status: SHIPPED | OUTPATIENT
Start: 2025-04-29

## 2025-04-29 RX ORDER — ROSUVASTATIN CALCIUM 40 MG/1
40 TABLET, COATED ORAL
Qty: 90 TABLET | Refills: 1 | Status: SHIPPED | OUTPATIENT
Start: 2025-04-29

## 2025-05-01 ENCOUNTER — OFFICE VISIT (OUTPATIENT)
Dept: CARDIAC REHAB | Facility: HOSPITAL | Age: 75
End: 2025-05-01

## 2025-05-01 VITALS — HEART RATE: 64 BPM | DIASTOLIC BLOOD PRESSURE: 68 MMHG | SYSTOLIC BLOOD PRESSURE: 114 MMHG | OXYGEN SATURATION: 97 %

## 2025-05-01 DIAGNOSIS — T82.855D STENOSIS OF CORONARY ARTERY STENT, SUBSEQUENT ENCOUNTER: Primary | ICD-10-CM

## 2025-05-06 ENCOUNTER — OFFICE VISIT (OUTPATIENT)
Dept: CARDIAC REHAB | Facility: HOSPITAL | Age: 75
End: 2025-05-06

## 2025-05-06 VITALS
OXYGEN SATURATION: 95 % | SYSTOLIC BLOOD PRESSURE: 110 MMHG | WEIGHT: 188.05 LBS | HEART RATE: 76 BPM | BODY MASS INDEX: 28.6 KG/M2 | DIASTOLIC BLOOD PRESSURE: 72 MMHG

## 2025-05-06 DIAGNOSIS — T82.855D STENOSIS OF CORONARY ARTERY STENT, SUBSEQUENT ENCOUNTER: Primary | ICD-10-CM

## 2025-05-08 ENCOUNTER — OFFICE VISIT (OUTPATIENT)
Dept: CARDIAC REHAB | Facility: HOSPITAL | Age: 75
End: 2025-05-08

## 2025-05-08 VITALS
OXYGEN SATURATION: 95 % | DIASTOLIC BLOOD PRESSURE: 70 MMHG | WEIGHT: 186.07 LBS | BODY MASS INDEX: 28.3 KG/M2 | SYSTOLIC BLOOD PRESSURE: 120 MMHG | HEART RATE: 62 BPM

## 2025-05-08 DIAGNOSIS — T82.855D STENOSIS OF CORONARY ARTERY STENT, SUBSEQUENT ENCOUNTER: Primary | ICD-10-CM

## 2025-05-13 ENCOUNTER — OFFICE VISIT (OUTPATIENT)
Dept: CARDIAC REHAB | Facility: HOSPITAL | Age: 75
End: 2025-05-13

## 2025-05-13 VITALS
BODY MASS INDEX: 28.94 KG/M2 | SYSTOLIC BLOOD PRESSURE: 120 MMHG | WEIGHT: 190.26 LBS | HEART RATE: 51 BPM | DIASTOLIC BLOOD PRESSURE: 80 MMHG | OXYGEN SATURATION: 97 %

## 2025-05-13 DIAGNOSIS — T82.855D STENOSIS OF CORONARY ARTERY STENT, SUBSEQUENT ENCOUNTER: Primary | ICD-10-CM

## 2025-05-15 ENCOUNTER — OFFICE VISIT (OUTPATIENT)
Dept: CARDIAC REHAB | Facility: HOSPITAL | Age: 75
End: 2025-05-15

## 2025-05-15 VITALS
WEIGHT: 185.85 LBS | DIASTOLIC BLOOD PRESSURE: 78 MMHG | OXYGEN SATURATION: 94 % | SYSTOLIC BLOOD PRESSURE: 118 MMHG | HEART RATE: 58 BPM | BODY MASS INDEX: 28.26 KG/M2

## 2025-05-15 DIAGNOSIS — T82.855D STENOSIS OF CORONARY ARTERY STENT, SUBSEQUENT ENCOUNTER: Primary | ICD-10-CM

## 2025-05-20 ENCOUNTER — OFFICE VISIT (OUTPATIENT)
Dept: CARDIAC REHAB | Facility: HOSPITAL | Age: 75
End: 2025-05-20

## 2025-05-20 VITALS
DIASTOLIC BLOOD PRESSURE: 70 MMHG | OXYGEN SATURATION: 96 % | WEIGHT: 186.73 LBS | BODY MASS INDEX: 28.4 KG/M2 | HEART RATE: 51 BPM | SYSTOLIC BLOOD PRESSURE: 138 MMHG

## 2025-05-20 DIAGNOSIS — T82.855D STENOSIS OF CORONARY ARTERY STENT, SUBSEQUENT ENCOUNTER: Primary | ICD-10-CM

## 2025-05-22 ENCOUNTER — OFFICE VISIT (OUTPATIENT)
Dept: CARDIAC REHAB | Facility: HOSPITAL | Age: 75
End: 2025-05-22

## 2025-05-22 VITALS
BODY MASS INDEX: 28.43 KG/M2 | WEIGHT: 186.95 LBS | OXYGEN SATURATION: 97 % | SYSTOLIC BLOOD PRESSURE: 140 MMHG | HEART RATE: 58 BPM | DIASTOLIC BLOOD PRESSURE: 80 MMHG

## 2025-05-22 DIAGNOSIS — T82.855D STENOSIS OF CORONARY ARTERY STENT, SUBSEQUENT ENCOUNTER: Primary | ICD-10-CM

## 2025-05-27 ENCOUNTER — OFFICE VISIT (OUTPATIENT)
Dept: CARDIAC REHAB | Facility: HOSPITAL | Age: 75
End: 2025-05-27

## 2025-05-27 VITALS
SYSTOLIC BLOOD PRESSURE: 120 MMHG | DIASTOLIC BLOOD PRESSURE: 80 MMHG | BODY MASS INDEX: 28.5 KG/M2 | HEART RATE: 57 BPM | WEIGHT: 187.39 LBS | OXYGEN SATURATION: 98 %

## 2025-05-27 DIAGNOSIS — T82.855D STENOSIS OF CORONARY ARTERY STENT, SUBSEQUENT ENCOUNTER: Primary | ICD-10-CM

## 2025-06-03 ENCOUNTER — OFFICE VISIT (OUTPATIENT)
Dept: CARDIAC REHAB | Facility: HOSPITAL | Age: 75
End: 2025-06-03

## 2025-06-03 VITALS
SYSTOLIC BLOOD PRESSURE: 120 MMHG | HEART RATE: 66 BPM | WEIGHT: 186.51 LBS | BODY MASS INDEX: 28.37 KG/M2 | OXYGEN SATURATION: 98 % | DIASTOLIC BLOOD PRESSURE: 70 MMHG

## 2025-06-03 DIAGNOSIS — T82.855D STENOSIS OF CORONARY ARTERY STENT, SUBSEQUENT ENCOUNTER: Primary | ICD-10-CM

## 2025-06-05 ENCOUNTER — OFFICE VISIT (OUTPATIENT)
Dept: CARDIAC REHAB | Facility: HOSPITAL | Age: 75
End: 2025-06-05

## 2025-06-05 VITALS
BODY MASS INDEX: 28.33 KG/M2 | SYSTOLIC BLOOD PRESSURE: 136 MMHG | OXYGEN SATURATION: 94 % | DIASTOLIC BLOOD PRESSURE: 88 MMHG | WEIGHT: 186.29 LBS | HEART RATE: 56 BPM

## 2025-06-05 DIAGNOSIS — T82.855D STENOSIS OF CORONARY ARTERY STENT, SUBSEQUENT ENCOUNTER: Primary | ICD-10-CM

## 2025-06-09 ENCOUNTER — TELEPHONE (OUTPATIENT)
Dept: UROLOGY | Age: 75
End: 2025-06-09
Payer: MEDICARE

## 2025-06-09 NOTE — TELEPHONE ENCOUNTER
----- Message from Raymundo Rubio sent at 6/9/2025  8:59 AM EDT -----  Regarding: apt  Apt On Friday, make sures getting his PSA and MRI prostate done for appointment

## 2025-06-09 NOTE — TELEPHONE ENCOUNTER
JEVONM for Pt to call back.   Patient has appointment with Dr. Rubio on 06/13.  I was calling to remind Pt to get their PSA lab work done before their appointment. Patient also has MRI scheduled on 6/11 at 7 am and was going to remind patient to not forget to get that done.   Will call again to contact PT.

## 2025-06-10 ENCOUNTER — OFFICE VISIT (OUTPATIENT)
Dept: CARDIAC REHAB | Facility: HOSPITAL | Age: 75
End: 2025-06-10

## 2025-06-10 VITALS
OXYGEN SATURATION: 96 % | DIASTOLIC BLOOD PRESSURE: 70 MMHG | SYSTOLIC BLOOD PRESSURE: 120 MMHG | BODY MASS INDEX: 28.43 KG/M2 | HEART RATE: 53 BPM | WEIGHT: 186.95 LBS

## 2025-06-10 DIAGNOSIS — T82.855D STENOSIS OF CORONARY ARTERY STENT, SUBSEQUENT ENCOUNTER: Primary | ICD-10-CM

## 2025-06-11 ENCOUNTER — HOSPITAL ENCOUNTER (OUTPATIENT)
Dept: MRI IMAGING | Facility: HOSPITAL | Age: 75
Discharge: HOME | End: 2025-06-11
Payer: MEDICARE

## 2025-06-11 ENCOUNTER — LAB (OUTPATIENT)
Dept: LAB | Facility: HOSPITAL | Age: 75
End: 2025-06-11
Payer: MEDICARE

## 2025-06-11 DIAGNOSIS — R97.20 ELEVATED PSA: ICD-10-CM

## 2025-06-11 LAB — PSA SERPL-MCNC: 7.39 NG/ML (ref 0–4)

## 2025-06-11 PROCEDURE — 84153 ASSAY OF PSA TOTAL: CPT

## 2025-06-11 PROCEDURE — 25510000001 GADOPICLENOL 0.5 MMOL/ML SOLUTION: Performed by: UROLOGY

## 2025-06-11 PROCEDURE — 36415 COLL VENOUS BLD VENIPUNCTURE: CPT

## 2025-06-11 PROCEDURE — 72197 MRI PELVIS W/O & W/DYE: CPT

## 2025-06-11 PROCEDURE — A9573 GADOPICLENOL 0.5 MMOL/ML SOLUTION: HCPCS | Performed by: UROLOGY

## 2025-06-11 RX ADMIN — GADOPICLENOL 7.5 ML: 485.1 INJECTION INTRAVENOUS at 07:27

## 2025-06-12 ENCOUNTER — OFFICE VISIT (OUTPATIENT)
Dept: CARDIAC REHAB | Facility: HOSPITAL | Age: 75
End: 2025-06-12

## 2025-06-12 VITALS
WEIGHT: 186.51 LBS | SYSTOLIC BLOOD PRESSURE: 120 MMHG | DIASTOLIC BLOOD PRESSURE: 60 MMHG | BODY MASS INDEX: 28.37 KG/M2 | HEART RATE: 62 BPM | OXYGEN SATURATION: 96 %

## 2025-06-12 DIAGNOSIS — T82.855D STENOSIS OF CORONARY ARTERY STENT, SUBSEQUENT ENCOUNTER: Primary | ICD-10-CM

## 2025-06-17 ENCOUNTER — OFFICE VISIT (OUTPATIENT)
Dept: CARDIAC REHAB | Facility: HOSPITAL | Age: 75
End: 2025-06-17

## 2025-06-17 VITALS
HEART RATE: 60 BPM | SYSTOLIC BLOOD PRESSURE: 110 MMHG | BODY MASS INDEX: 28.4 KG/M2 | WEIGHT: 186.73 LBS | OXYGEN SATURATION: 97 % | DIASTOLIC BLOOD PRESSURE: 70 MMHG

## 2025-06-17 DIAGNOSIS — T82.855D STENOSIS OF CORONARY ARTERY STENT, SUBSEQUENT ENCOUNTER: Primary | ICD-10-CM

## 2025-06-19 ENCOUNTER — OFFICE VISIT (OUTPATIENT)
Dept: CARDIAC REHAB | Facility: HOSPITAL | Age: 75
End: 2025-06-19

## 2025-06-19 VITALS — OXYGEN SATURATION: 95 % | HEART RATE: 53 BPM | DIASTOLIC BLOOD PRESSURE: 64 MMHG | SYSTOLIC BLOOD PRESSURE: 116 MMHG

## 2025-06-19 DIAGNOSIS — T82.855D STENOSIS OF CORONARY ARTERY STENT, SUBSEQUENT ENCOUNTER: Primary | ICD-10-CM

## 2025-06-19 NOTE — PROGRESS NOTES
Chief Complaint    Urologic complaint    Subjective          Yann Pardo presents to [facility]  History of Present Illness       75 year old  gentleman     Elevated PSA        Voiding ok.   frequency. NO presssure  Noc 1.  No change.     No GH/ UTI    No prostate meds      PVR       000        No trouble with ED    PRevious    Patient does have decreased ejaculate from prior, he notices after his prostate biopsy      No urologic family history.  No family history of prostate cancer.    Mother  at 92 and father  at 86.    s/p cardiac stent.  Patient does not smoke. Baby aspirin daily.        PSA      MRI prostate - 66 g, PSAD 0.11  PI - RADS 4 - 11 x 4 mm, right peripheral base posterior lateral abuts prostatic capsule without jonathan EPE.  Stable        7.3         8.0         7.2   PSAd  0.11    2024 MRI prostate - 61 g  PI-RADS 4-posterior peripheral zone base on right.  Touches posterior lateral capsule surface no definitive extracapsular extension, 11 x 5 x 10 mm, unchanged.  Negative otherwise       7.2    2023 MRI prostate - 57 g  PI-RADS 4 -11 mm, right posterior lateral mid to base peripheral zone lesion.  Stable for the last 3 years.  Chronic prostatitis          8.7       2022 MRI prostate-60 g  PI-RADS 4-right peripheral zone 10 x 4 mm.  Stable from prior exam.  No extraprostatic extension.  Posterior marginal abutment.       7.3       7.2       11/15/2021 MRI prostate 65 g-PI-RADS 4 lesion right posterior lateral peripheral zone.  Stable in size.  11 mm      6.58    10/28/2020 MRI fusion prostate biopsy - right base focal high-grade PIN, negative otherwise    10/20 prostate MRI  49 g - lesion in the right posterior mid to base peripheral zone PIRADS 4 - 11 mm .  More indeterminate lesion in the anterior mid left peripheral zone 16mm, PIRADS 3      9.51  3/20 prostate biopsy - 49 g -   neg  12/19   6.89  5/18     5.4, percent free 20% (23%)  12/17   6.09  11/10  1.8  10/09  1.6  4/06    1.7            Past History:  Medical History: has a past medical history of Allergic, Asthma, Bronchospasm, Bronchospasm, CAD (coronary artery disease), Colon polyp, Diverticulosis, Elevated PSA, GERD (gastroesophageal reflux disease), Heart disease, Hemorrhoids, Hyperglycemia, Hyperlipidemia, Hypertension, Lung collapse, Mitral regurgitation, Rib fracture, Sinus bradycardia, Sinus bradycardia, Sinusitis, and Tubular adenoma.   Surgical History: has a past surgical history that includes Coronary stent placement; Colonoscopy; Colonoscopy; Coronary stent placement; Hernia repair; Portacath placement; Prostate biopsy; Colonoscopy (N/A, 03/30/2023); and Eye surgery (Bilateral).   Family History: family history includes Coronary artery disease in an other family member; Kidney cancer in his father.   Social History: reports that he quit smoking about 52 years ago. His smoking use included cigarettes. He started smoking about 56 years ago. He has a 2 pack-year smoking history. He quit smokeless tobacco use about 40 years ago.  His smokeless tobacco use included chew. He reports current alcohol use of about 6.0 standard drinks of alcohol per week. He reports that he does not use drugs.  Allergies: Patient has no known allergies.       Current Outpatient Medications:     aspirin 81 MG EC tablet, Take 1 tablet by mouth Daily., Disp: , Rfl:     erythromycin with ethanol (THERAMYCIN) 2 % external solution, Apply  topically to the appropriate area as directed Daily., Disp: 15 mL, Rfl: 5    levothyroxine (Synthroid) 25 MCG tablet, Take 1 tablet by mouth Daily., Disp: 90 tablet, Rfl: 1    metoprolol succinate XL (TOPROL-XL) 25 MG 24 hr tablet, TAKE 1/2 TABLET BY MOUTH EVERY DAY, Disp: 45 tablet, Rfl: 1    nitroglycerin (NITROLINGUAL) 0.4 MG/SPRAY spray, Place 1 spray under the tongue Every 5 (Five) Minutes As Needed for Chest  Pain., Disp: 1 each, Rfl: 1    omeprazole (priLOSEC) 40 MG capsule, Take 1 capsule by mouth Daily., Disp: 90 capsule, Rfl: 3    rosuvastatin (CRESTOR) 40 MG tablet, TAKE 1 TABLET BY MOUTH EVERY NIGHT AT BEDTIME, Disp: 90 tablet, Rfl: 1    vitamin D (ERGOCALCIFEROL) 1.25 MG (06277 UT) capsule capsule, TAKE 1 CAPSULE BY MOUTH EVERY WEEK, Disp: 4 capsule, Rfl: 1            Objective     Vital Signs:   There were no vitals taken for this visit.             Assessment and Plan    Diagnoses and all orders for this visit:    1. Elevated PSA (Primary)              Elevated PSA          PSA stable, MRI stable.  Patient given reassurance continue to monitor conservatively.        F/u In 1 year with PSA and MRI prostate.

## 2025-06-23 ENCOUNTER — OFFICE VISIT (OUTPATIENT)
Dept: UROLOGY | Age: 75
End: 2025-06-23
Payer: MEDICARE

## 2025-06-23 VITALS — RESPIRATION RATE: 18 BRPM | HEIGHT: 68 IN | WEIGHT: 186 LBS | BODY MASS INDEX: 28.19 KG/M2

## 2025-06-23 DIAGNOSIS — R97.20 ELEVATED PSA: Primary | ICD-10-CM

## 2025-06-23 PROCEDURE — 1159F MED LIST DOCD IN RCRD: CPT | Performed by: UROLOGY

## 2025-06-23 PROCEDURE — G2211 COMPLEX E/M VISIT ADD ON: HCPCS | Performed by: UROLOGY

## 2025-06-23 PROCEDURE — 1160F RVW MEDS BY RX/DR IN RCRD: CPT | Performed by: UROLOGY

## 2025-06-23 PROCEDURE — 99213 OFFICE O/P EST LOW 20 MIN: CPT | Performed by: UROLOGY

## 2025-06-24 ENCOUNTER — OFFICE VISIT (OUTPATIENT)
Dept: CARDIAC REHAB | Facility: HOSPITAL | Age: 75
End: 2025-06-24

## 2025-06-24 VITALS
WEIGHT: 187.39 LBS | SYSTOLIC BLOOD PRESSURE: 122 MMHG | BODY MASS INDEX: 28.5 KG/M2 | HEART RATE: 59 BPM | DIASTOLIC BLOOD PRESSURE: 80 MMHG | OXYGEN SATURATION: 96 %

## 2025-06-24 DIAGNOSIS — T82.855D STENOSIS OF CORONARY ARTERY STENT, SUBSEQUENT ENCOUNTER: Primary | ICD-10-CM

## 2025-06-26 ENCOUNTER — OFFICE VISIT (OUTPATIENT)
Dept: CARDIAC REHAB | Facility: HOSPITAL | Age: 75
End: 2025-06-26

## 2025-06-26 VITALS
WEIGHT: 185.41 LBS | SYSTOLIC BLOOD PRESSURE: 124 MMHG | HEART RATE: 62 BPM | OXYGEN SATURATION: 97 % | DIASTOLIC BLOOD PRESSURE: 78 MMHG | BODY MASS INDEX: 28.2 KG/M2

## 2025-06-26 DIAGNOSIS — T82.855D STENOSIS OF CORONARY ARTERY STENT, SUBSEQUENT ENCOUNTER: Primary | ICD-10-CM

## 2025-07-01 ENCOUNTER — OFFICE VISIT (OUTPATIENT)
Dept: CARDIAC REHAB | Facility: HOSPITAL | Age: 75
End: 2025-07-01

## 2025-07-01 VITALS
OXYGEN SATURATION: 97 % | SYSTOLIC BLOOD PRESSURE: 138 MMHG | HEART RATE: 56 BPM | BODY MASS INDEX: 28.33 KG/M2 | WEIGHT: 186.29 LBS | DIASTOLIC BLOOD PRESSURE: 80 MMHG

## 2025-07-01 DIAGNOSIS — T82.855D STENOSIS OF CORONARY ARTERY STENT, SUBSEQUENT ENCOUNTER: Primary | ICD-10-CM

## 2025-07-03 ENCOUNTER — OFFICE VISIT (OUTPATIENT)
Dept: CARDIAC REHAB | Facility: HOSPITAL | Age: 75
End: 2025-07-03

## 2025-07-03 VITALS
HEART RATE: 65 BPM | OXYGEN SATURATION: 95 % | SYSTOLIC BLOOD PRESSURE: 134 MMHG | BODY MASS INDEX: 28.2 KG/M2 | DIASTOLIC BLOOD PRESSURE: 74 MMHG | WEIGHT: 185.41 LBS

## 2025-07-03 DIAGNOSIS — T82.855D STENOSIS OF CORONARY ARTERY STENT, SUBSEQUENT ENCOUNTER: Primary | ICD-10-CM

## 2025-07-08 ENCOUNTER — OFFICE VISIT (OUTPATIENT)
Dept: CARDIAC REHAB | Facility: HOSPITAL | Age: 75
End: 2025-07-08

## 2025-07-08 VITALS
SYSTOLIC BLOOD PRESSURE: 128 MMHG | WEIGHT: 184.53 LBS | DIASTOLIC BLOOD PRESSURE: 70 MMHG | HEART RATE: 73 BPM | BODY MASS INDEX: 28.06 KG/M2 | OXYGEN SATURATION: 98 %

## 2025-07-08 DIAGNOSIS — T82.855D STENOSIS OF CORONARY ARTERY STENT, SUBSEQUENT ENCOUNTER: Primary | ICD-10-CM

## 2025-07-10 ENCOUNTER — OFFICE VISIT (OUTPATIENT)
Dept: CARDIAC REHAB | Facility: HOSPITAL | Age: 75
End: 2025-07-10

## 2025-07-10 VITALS
DIASTOLIC BLOOD PRESSURE: 70 MMHG | OXYGEN SATURATION: 96 % | BODY MASS INDEX: 28.23 KG/M2 | WEIGHT: 185.63 LBS | HEART RATE: 64 BPM | SYSTOLIC BLOOD PRESSURE: 124 MMHG

## 2025-07-10 DIAGNOSIS — T82.855D STENOSIS OF CORONARY ARTERY STENT, SUBSEQUENT ENCOUNTER: Primary | ICD-10-CM

## 2025-07-15 ENCOUNTER — OFFICE VISIT (OUTPATIENT)
Dept: CARDIAC REHAB | Facility: HOSPITAL | Age: 75
End: 2025-07-15

## 2025-07-15 VITALS
OXYGEN SATURATION: 96 % | WEIGHT: 187.39 LBS | BODY MASS INDEX: 28.5 KG/M2 | SYSTOLIC BLOOD PRESSURE: 122 MMHG | DIASTOLIC BLOOD PRESSURE: 70 MMHG | HEART RATE: 53 BPM

## 2025-07-15 DIAGNOSIS — T82.855D STENOSIS OF CORONARY ARTERY STENT, SUBSEQUENT ENCOUNTER: Primary | ICD-10-CM

## 2025-07-21 ENCOUNTER — TELEPHONE (OUTPATIENT)
Dept: INTERNAL MEDICINE | Age: 75
End: 2025-07-21
Payer: MEDICARE

## 2025-07-21 DIAGNOSIS — E06.3 HYPOTHYROIDISM DUE TO HASHIMOTO THYROIDITIS: Primary | ICD-10-CM

## 2025-07-21 RX ORDER — LEVOTHYROXINE SODIUM 50 UG/1
50 TABLET ORAL DAILY
Qty: 90 TABLET | Refills: 3 | OUTPATIENT
Start: 2025-07-21

## 2025-07-21 NOTE — TELEPHONE ENCOUNTER
[facility] for return call for pt to clarify levothyroxine dose.   Received request for 50 mcg once daily, per last ov pt is to take 25 mcg.

## 2025-07-22 ENCOUNTER — OFFICE VISIT (OUTPATIENT)
Dept: CARDIAC REHAB | Facility: HOSPITAL | Age: 75
End: 2025-07-22

## 2025-07-22 VITALS
WEIGHT: 185.85 LBS | SYSTOLIC BLOOD PRESSURE: 118 MMHG | OXYGEN SATURATION: 94 % | DIASTOLIC BLOOD PRESSURE: 64 MMHG | HEART RATE: 63 BPM | BODY MASS INDEX: 28.26 KG/M2

## 2025-07-22 DIAGNOSIS — T82.855D STENOSIS OF CORONARY ARTERY STENT, SUBSEQUENT ENCOUNTER: Primary | ICD-10-CM

## 2025-07-24 ENCOUNTER — OFFICE VISIT (OUTPATIENT)
Dept: CARDIAC REHAB | Facility: HOSPITAL | Age: 75
End: 2025-07-24

## 2025-07-24 VITALS — OXYGEN SATURATION: 95 % | SYSTOLIC BLOOD PRESSURE: 122 MMHG | DIASTOLIC BLOOD PRESSURE: 66 MMHG | HEART RATE: 69 BPM

## 2025-07-24 DIAGNOSIS — T82.855D STENOSIS OF CORONARY ARTERY STENT, SUBSEQUENT ENCOUNTER: Primary | ICD-10-CM

## 2025-07-24 RX ORDER — LEVOTHYROXINE SODIUM 50 UG/1
50 TABLET ORAL
Qty: 90 TABLET | Refills: 3 | Status: SHIPPED | OUTPATIENT
Start: 2025-07-24

## 2025-07-24 NOTE — TELEPHONE ENCOUNTER
"Spoke w/ pt, unsure what current levothyroxine dose is supposed to be.  Called [facility], last fill was for levothyroxine 50 mcg on  7/22/24.    Last ov: \" Hypothyroidism, continues  levothyroxine 0.025 mg daily will monitor \"    Pt needs refill, unsure which dose to dispense. Please advise.  "

## 2025-07-29 ENCOUNTER — OFFICE VISIT (OUTPATIENT)
Dept: CARDIAC REHAB | Facility: HOSPITAL | Age: 75
End: 2025-07-29

## 2025-07-29 VITALS
DIASTOLIC BLOOD PRESSURE: 80 MMHG | WEIGHT: 185.19 LBS | HEART RATE: 64 BPM | BODY MASS INDEX: 28.16 KG/M2 | OXYGEN SATURATION: 95 % | SYSTOLIC BLOOD PRESSURE: 118 MMHG

## 2025-07-29 DIAGNOSIS — T82.855D STENOSIS OF CORONARY ARTERY STENT, SUBSEQUENT ENCOUNTER: Primary | ICD-10-CM

## 2025-08-05 ENCOUNTER — OFFICE VISIT (OUTPATIENT)
Dept: CARDIAC REHAB | Facility: HOSPITAL | Age: 75
End: 2025-08-05

## 2025-08-05 VITALS
SYSTOLIC BLOOD PRESSURE: 118 MMHG | HEART RATE: 58 BPM | WEIGHT: 185.63 LBS | OXYGEN SATURATION: 97 % | DIASTOLIC BLOOD PRESSURE: 80 MMHG | BODY MASS INDEX: 28.23 KG/M2

## 2025-08-05 DIAGNOSIS — T82.855D STENOSIS OF CORONARY ARTERY STENT, SUBSEQUENT ENCOUNTER: Primary | ICD-10-CM

## 2025-08-07 ENCOUNTER — OFFICE VISIT (OUTPATIENT)
Dept: CARDIAC REHAB | Facility: HOSPITAL | Age: 75
End: 2025-08-07

## 2025-08-07 DIAGNOSIS — T82.855D STENOSIS OF CORONARY ARTERY STENT, SUBSEQUENT ENCOUNTER: Primary | ICD-10-CM

## 2025-08-11 ENCOUNTER — LAB (OUTPATIENT)
Dept: LAB | Facility: HOSPITAL | Age: 75
End: 2025-08-11
Payer: MEDICARE

## 2025-08-11 DIAGNOSIS — I34.0 MITRAL VALVE INSUFFICIENCY, UNSPECIFIED ETIOLOGY: ICD-10-CM

## 2025-08-11 DIAGNOSIS — R74.8 ELEVATED LIVER ENZYMES: ICD-10-CM

## 2025-08-11 DIAGNOSIS — I25.10 CORONARY ARTERY DISEASE INVOLVING NATIVE CORONARY ARTERY OF NATIVE HEART WITHOUT ANGINA PECTORIS: ICD-10-CM

## 2025-08-11 DIAGNOSIS — Z86.0100 HISTORY OF COLON POLYPS: ICD-10-CM

## 2025-08-11 DIAGNOSIS — R97.20 ELEVATED PSA: ICD-10-CM

## 2025-08-11 DIAGNOSIS — J45.20 MILD INTERMITTENT ASTHMA, UNSPECIFIED WHETHER COMPLICATED: ICD-10-CM

## 2025-08-11 DIAGNOSIS — N18.31 STAGE 3A CHRONIC KIDNEY DISEASE: ICD-10-CM

## 2025-08-11 DIAGNOSIS — Z00.00 MEDICARE ANNUAL WELLNESS VISIT, SUBSEQUENT: ICD-10-CM

## 2025-08-11 DIAGNOSIS — E55.9 VITAMIN D DEFICIENCY: ICD-10-CM

## 2025-08-11 DIAGNOSIS — E78.00 PURE HYPERCHOLESTEROLEMIA: ICD-10-CM

## 2025-08-11 DIAGNOSIS — E06.3 HYPOTHYROIDISM DUE TO HASHIMOTO THYROIDITIS: ICD-10-CM

## 2025-08-11 DIAGNOSIS — Z95.5 S/P CORONARY ARTERY STENT PLACEMENT: ICD-10-CM

## 2025-08-11 DIAGNOSIS — E03.8 SUBCLINICAL HYPOTHYROIDISM: ICD-10-CM

## 2025-08-11 DIAGNOSIS — I10 PRIMARY HYPERTENSION: ICD-10-CM

## 2025-08-11 DIAGNOSIS — C61 PROSTATE CANCER: ICD-10-CM

## 2025-08-11 DIAGNOSIS — R73.01 IFG (IMPAIRED FASTING GLUCOSE): ICD-10-CM

## 2025-08-11 LAB
ALBUMIN SERPL-MCNC: 4.1 G/DL (ref 3.5–5.2)
ALBUMIN UR-MCNC: <1.2 MG/DL
ALBUMIN/GLOB SERPL: 1.9 G/DL
ALP SERPL-CCNC: 63 U/L (ref 39–117)
ALT SERPL W P-5'-P-CCNC: 28 U/L (ref 1–41)
ANION GAP SERPL CALCULATED.3IONS-SCNC: 13 MMOL/L (ref 5–15)
AST SERPL-CCNC: 22 U/L (ref 1–40)
BASOPHILS # BLD AUTO: 0.06 10*3/MM3 (ref 0–0.2)
BASOPHILS NFR BLD AUTO: 0.9 % (ref 0–1.5)
BILIRUB SERPL-MCNC: 0.3 MG/DL (ref 0–1.2)
BUN SERPL-MCNC: 20 MG/DL (ref 8–23)
BUN/CREAT SERPL: 12.7 (ref 7–25)
CALCIUM SPEC-SCNC: 9 MG/DL (ref 8.6–10.5)
CHLORIDE SERPL-SCNC: 107 MMOL/L (ref 98–107)
CHOLEST SERPL-MCNC: 143 MG/DL (ref 0–200)
CO2 SERPL-SCNC: 22 MMOL/L (ref 22–29)
CREAT SERPL-MCNC: 1.57 MG/DL (ref 0.76–1.27)
DEPRECATED RDW RBC AUTO: 40.3 FL (ref 37–54)
EGFRCR SERPLBLD CKD-EPI 2021: 45.4 ML/MIN/1.73
EOSINOPHIL # BLD AUTO: 0.18 10*3/MM3 (ref 0–0.4)
EOSINOPHIL NFR BLD AUTO: 2.8 % (ref 0.3–6.2)
ERYTHROCYTE [DISTWIDTH] IN BLOOD BY AUTOMATED COUNT: 12.3 % (ref 12.3–15.4)
GLOBULIN UR ELPH-MCNC: 2.2 GM/DL
GLUCOSE SERPL-MCNC: 117 MG/DL (ref 65–99)
HBA1C MFR BLD: 5.7 % (ref 4.8–5.6)
HCT VFR BLD AUTO: 44.2 % (ref 37.5–51)
HDLC SERPL-MCNC: 40 MG/DL (ref 40–60)
HGB BLD-MCNC: 15 G/DL (ref 13–17.7)
IMM GRANULOCYTES # BLD AUTO: 0.02 10*3/MM3 (ref 0–0.05)
IMM GRANULOCYTES NFR BLD AUTO: 0.3 % (ref 0–0.5)
LDLC SERPL CALC-MCNC: 73 MG/DL (ref 0–100)
LDLC/HDLC SERPL: 1.68 {RATIO}
LYMPHOCYTES # BLD AUTO: 1.59 10*3/MM3 (ref 0.7–3.1)
LYMPHOCYTES NFR BLD AUTO: 24.7 % (ref 19.6–45.3)
MCH RBC QN AUTO: 30.8 PG (ref 26.6–33)
MCHC RBC AUTO-ENTMCNC: 33.9 G/DL (ref 31.5–35.7)
MCV RBC AUTO: 90.8 FL (ref 79–97)
MONOCYTES # BLD AUTO: 0.47 10*3/MM3 (ref 0.1–0.9)
MONOCYTES NFR BLD AUTO: 7.3 % (ref 5–12)
NEUTROPHILS NFR BLD AUTO: 4.11 10*3/MM3 (ref 1.7–7)
NEUTROPHILS NFR BLD AUTO: 64 % (ref 42.7–76)
NRBC BLD AUTO-RTO: 0 /100 WBC (ref 0–0.2)
PLATELET # BLD AUTO: 178 10*3/MM3 (ref 140–450)
PMV BLD AUTO: 10.1 FL (ref 6–12)
POTASSIUM SERPL-SCNC: 4.3 MMOL/L (ref 3.5–5.2)
PROT SERPL-MCNC: 6.3 G/DL (ref 6–8.5)
PSA SERPL-MCNC: 8.88 NG/ML (ref 0–4)
RBC # BLD AUTO: 4.87 10*6/MM3 (ref 4.14–5.8)
SODIUM SERPL-SCNC: 142 MMOL/L (ref 136–145)
T4 FREE SERPL-MCNC: 1.05 NG/DL (ref 0.92–1.68)
TRIGL SERPL-MCNC: 180 MG/DL (ref 0–150)
TSH SERPL DL<=0.05 MIU/L-ACNC: 5.34 UIU/ML (ref 0.27–4.2)
VLDLC SERPL-MCNC: 30 MG/DL (ref 5–40)
WBC NRBC COR # BLD AUTO: 6.43 10*3/MM3 (ref 3.4–10.8)

## 2025-08-11 PROCEDURE — 84443 ASSAY THYROID STIM HORMONE: CPT

## 2025-08-11 PROCEDURE — 85025 COMPLETE CBC W/AUTO DIFF WBC: CPT

## 2025-08-11 PROCEDURE — 80053 COMPREHEN METABOLIC PANEL: CPT

## 2025-08-11 PROCEDURE — 80061 LIPID PANEL: CPT

## 2025-08-11 PROCEDURE — 83036 HEMOGLOBIN GLYCOSYLATED A1C: CPT

## 2025-08-11 PROCEDURE — 84153 ASSAY OF PSA TOTAL: CPT

## 2025-08-11 PROCEDURE — 36415 COLL VENOUS BLD VENIPUNCTURE: CPT

## 2025-08-11 PROCEDURE — 84439 ASSAY OF FREE THYROXINE: CPT

## 2025-08-11 PROCEDURE — 82043 UR ALBUMIN QUANTITATIVE: CPT

## 2025-08-12 ENCOUNTER — OFFICE VISIT (OUTPATIENT)
Dept: CARDIAC REHAB | Facility: HOSPITAL | Age: 75
End: 2025-08-12

## 2025-08-12 VITALS
OXYGEN SATURATION: 97 % | BODY MASS INDEX: 28 KG/M2 | DIASTOLIC BLOOD PRESSURE: 78 MMHG | SYSTOLIC BLOOD PRESSURE: 110 MMHG | WEIGHT: 184.08 LBS | HEART RATE: 62 BPM

## 2025-08-12 DIAGNOSIS — T82.855D STENOSIS OF CORONARY ARTERY STENT, SUBSEQUENT ENCOUNTER: Primary | ICD-10-CM

## 2025-08-14 ENCOUNTER — OFFICE VISIT (OUTPATIENT)
Dept: CARDIAC REHAB | Facility: HOSPITAL | Age: 75
End: 2025-08-14

## 2025-08-14 VITALS
BODY MASS INDEX: 27.96 KG/M2 | SYSTOLIC BLOOD PRESSURE: 118 MMHG | WEIGHT: 183.86 LBS | HEART RATE: 60 BPM | DIASTOLIC BLOOD PRESSURE: 70 MMHG | OXYGEN SATURATION: 95 %

## 2025-08-14 DIAGNOSIS — T82.855D STENOSIS OF CORONARY ARTERY STENT, SUBSEQUENT ENCOUNTER: Primary | ICD-10-CM

## 2025-08-19 ENCOUNTER — OFFICE VISIT (OUTPATIENT)
Dept: CARDIAC REHAB | Facility: HOSPITAL | Age: 75
End: 2025-08-19

## 2025-08-19 VITALS
SYSTOLIC BLOOD PRESSURE: 130 MMHG | DIASTOLIC BLOOD PRESSURE: 82 MMHG | HEART RATE: 55 BPM | OXYGEN SATURATION: 96 % | WEIGHT: 184.08 LBS | BODY MASS INDEX: 28 KG/M2

## 2025-08-19 DIAGNOSIS — T82.855D STENOSIS OF CORONARY ARTERY STENT, SUBSEQUENT ENCOUNTER: Primary | ICD-10-CM

## 2025-08-21 ENCOUNTER — OFFICE VISIT (OUTPATIENT)
Dept: CARDIAC REHAB | Facility: HOSPITAL | Age: 75
End: 2025-08-21

## 2025-08-21 VITALS
BODY MASS INDEX: 28.33 KG/M2 | HEART RATE: 62 BPM | OXYGEN SATURATION: 95 % | DIASTOLIC BLOOD PRESSURE: 80 MMHG | SYSTOLIC BLOOD PRESSURE: 140 MMHG | WEIGHT: 186.29 LBS

## 2025-08-21 DIAGNOSIS — T82.855D STENOSIS OF CORONARY ARTERY STENT, SUBSEQUENT ENCOUNTER: Primary | ICD-10-CM

## 2025-08-26 ENCOUNTER — OFFICE VISIT (OUTPATIENT)
Dept: CARDIAC REHAB | Facility: HOSPITAL | Age: 75
End: 2025-08-26

## 2025-08-26 VITALS
HEART RATE: 53 BPM | BODY MASS INDEX: 28.2 KG/M2 | SYSTOLIC BLOOD PRESSURE: 110 MMHG | WEIGHT: 185.41 LBS | OXYGEN SATURATION: 97 % | DIASTOLIC BLOOD PRESSURE: 70 MMHG

## 2025-08-26 DIAGNOSIS — T82.855D STENOSIS OF CORONARY ARTERY STENT, SUBSEQUENT ENCOUNTER: Primary | ICD-10-CM

## 2025-08-28 ENCOUNTER — OFFICE VISIT (OUTPATIENT)
Dept: CARDIAC REHAB | Facility: HOSPITAL | Age: 75
End: 2025-08-28

## 2025-08-28 VITALS
HEART RATE: 65 BPM | BODY MASS INDEX: 28.13 KG/M2 | DIASTOLIC BLOOD PRESSURE: 70 MMHG | WEIGHT: 184.97 LBS | SYSTOLIC BLOOD PRESSURE: 120 MMHG | OXYGEN SATURATION: 96 %

## 2025-08-28 DIAGNOSIS — T82.855D STENOSIS OF CORONARY ARTERY STENT, SUBSEQUENT ENCOUNTER: Primary | ICD-10-CM

## (undated) DEVICE — SOLIDIFIER LIQLOC PLS 1500CC BT

## (undated) DEVICE — LINER SURG CANSTR SXN S/RIGD 1500CC

## (undated) DEVICE — SOL IRRG H2O PL/BG 1000ML STRL

## (undated) DEVICE — SINGLE-USE BIOPSY FORCEPS: Brand: RADIAL JAW 4

## (undated) DEVICE — Device: Brand: DEFENDO AIR/WATER/SUCTION AND BIOPSY VALVE

## (undated) DEVICE — CONN JET HYDRA H20 AUXILIARY DISP

## (undated) DEVICE — SNAR POLYP CAPTIFLEX XS/OVL 11X2.4MM 240CM 1P/U

## (undated) DEVICE — THE SINGLE USE ETRAP – POLYP TRAP IS USED FOR SUCTION RETRIEVAL OF ENDOSCOPICALLY REMOVED POLYPS.: Brand: ETRAP

## (undated) DEVICE — Device